# Patient Record
Sex: FEMALE | Race: WHITE | NOT HISPANIC OR LATINO | Employment: UNEMPLOYED | URBAN - METROPOLITAN AREA
[De-identification: names, ages, dates, MRNs, and addresses within clinical notes are randomized per-mention and may not be internally consistent; named-entity substitution may affect disease eponyms.]

---

## 2017-03-01 ENCOUNTER — OFFICE VISIT (OUTPATIENT)
Dept: NEUROLOGY | Facility: HOSPITAL | Age: 52
End: 2017-03-01
Attending: INTERNAL MEDICINE
Payer: MEDICAID

## 2017-03-01 ENCOUNTER — TELEPHONE (OUTPATIENT)
Dept: NEUROLOGY | Facility: HOSPITAL | Age: 52
End: 2017-03-01

## 2017-03-01 VITALS
HEIGHT: 64 IN | TEMPERATURE: 98 F | SYSTOLIC BLOOD PRESSURE: 156 MMHG | WEIGHT: 105 LBS | DIASTOLIC BLOOD PRESSURE: 106 MMHG | BODY MASS INDEX: 17.93 KG/M2 | HEART RATE: 75 BPM

## 2017-03-01 DIAGNOSIS — R10.9 ABDOMINAL PAIN, UNSPECIFIED LOCATION: Primary | ICD-10-CM

## 2017-03-01 PROCEDURE — 99215 OFFICE O/P EST HI 40 MIN: CPT | Performed by: INTERNAL MEDICINE

## 2017-03-01 RX ORDER — ALBUTEROL SULFATE 90 UG/1
AEROSOL, METERED RESPIRATORY (INHALATION)
Refills: 5 | COMMUNITY
Start: 2017-01-06

## 2017-03-01 RX ORDER — ESOMEPRAZOLE MAGNESIUM 20 MG/1
20 TABLET, DELAYED RELEASE ORAL DAILY
Qty: 90 TABLET | Refills: 1 | Status: SHIPPED | OUTPATIENT
Start: 2017-03-01 | End: 2020-01-16 | Stop reason: SDUPTHER

## 2017-03-01 RX ORDER — TIOTROPIUM BROMIDE 18 UG/1
CAPSULE ORAL; RESPIRATORY (INHALATION)
Refills: 2 | COMMUNITY
Start: 2016-12-06 | End: 2019-07-11

## 2017-03-01 RX ORDER — HYOSCYAMINE SULFATE 0.12 MG/1
0.12 TABLET SUBLINGUAL EVERY 8 HOURS PRN
Qty: 50 TABLET | Refills: 1 | Status: SHIPPED | OUTPATIENT
Start: 2017-03-01

## 2017-03-01 NOTE — TELEPHONE ENCOUNTER
----- Message from Patricia Betancourt LPN sent at 3/1/2017 11:19 AM CST -----  Patient scheduled for an EGD 3/30  CPT- 92846  DX- R10.9 Abdominal pain  Thanks

## 2017-03-01 NOTE — MR AVS SNAPSHOT
[facility] [address]  [place] [address]  Phone: [phone]  Fax: [phone]                  Amaya VALENTINE Rafael   3/1/2017 10:15 AM   Office Visit    Description:  Female : 1965   Provider:  Constantine Avila MD   Department:  [facility]           Reason for Visit     Consult           Diagnoses this Visit        Comments    Abdominal pain, unspecified location    -  Primary            To Do List           Goals (5 Years of Data)     None       These Medications        Disp Refills Start End    hyoscyamine (LEVSIN/SL) 0.125 mg Subl 50 tablet 1 3/1/2017     Place 1 tablet (0.125 mg total) under the tongue every 8 (eight) hours as needed (abdominal pain). - Sublingual    Pharmacy: [facility] [id number]  [place] - [address] AT [facility] Ph #: [phone]       esomeprazole magnesium (NEXIUM 24HR) 20 mg TbEC 90 tablet 1 3/1/2017     Take 20 mg by mouth once daily. - Oral    Pharmacy: [facility] [id number]  [place] - [address] AT [facility] Ph #: [phone]         [facility]     [facility] Care [facility] -  Assistance  Registered nurses in the [facility] provide clinical advisement, health education, appointment booking, and other advisory services.  Call for this free service at [phone].             Medications           Message regarding Medications     Verify the changes and/or additions to your medication regime listed below are the same as discussed with your clinician today.  If any of these changes or additions are incorrect, please notify your healthcare provider.        START taking these NEW medications        Refills    hyoscyamine (LEVSIN/SL) 0.125 mg Subl 1    Sig: Place 1 tablet (0.125 mg total) under the tongue every 8 (eight) hours as needed (abdominal pain).    Class: Normal    Route: Sublingual    esomeprazole  magnesium (NEXIUM 24HR) 20 mg TbEC 1    Sig: Take 20 mg by mouth once daily.    Class: Normal    Route: Oral      STOP taking these medications     hydrocodone-acetaminophen 5-325mg (NORCO) 5-325 mg per tablet Take 1 tablet by mouth every 4 (four) hours as needed for Pain.    lisinopril 10 MG tablet Take 10 mg by mouth once daily.    promethazine (PHENERGAN) 25 MG tablet Take 1 tablet (25 mg total) by mouth every 6 (six) hours as needed for Nausea.    esomeprazole (NEXIUM) 40 MG capsule Take 1 capsule (40 mg total) by mouth once daily.    omeprazole (PRILOSEC) 20 MG capsule Take 1 capsule (20 mg total) by mouth once daily.           Verify that the below list of medications is an accurate representation of the medications you are currently taking.  If none reported, the list may be blank. If incorrect, please contact your healthcare provider. Carry this list with you in case of emergency.           Current Medications     budesonide-formoterol 160-4.5 mcg (SYMBICORT) 160-4.5 mcg/actuation HFAA Inhale 2 puffs into the lungs every 12 (twelve) hours.    dicyclomine (BENTYL) 20 mg tablet Take 1 tablet (20 mg total) by mouth every 6 (six) hours.    gabapentin (NEURONTIN) 600 MG tablet Take 600 mg by mouth 3 (three) times daily.    nicotine (NICODERM CQ) 21 mg/24 hr Place 1 patch onto the skin once daily.    ondansetron (ZOFRAN) 4 MG tablet Take 1 tablet (4 mg total) by mouth every 8 (eight) hours as needed.    potassium chloride SA (K-DUR,KLOR-CON) 20 MEQ tablet Take 1 tablet (20 mEq total) by mouth once daily.    ranitidine (ZANTAC) 150 MG tablet TK 1 T PO HS    SPIRIVA WITH HANDIHALER 18 mcg inhalation capsule INHALE 1 C PO QD    sucralfate (CARAFATE) 100 mg/mL suspension Take 10 mLs (1 g total) by mouth 4 (four) times daily with meals and nightly.    VENTOLIN HFA 90 mcg/actuation inhaler INL 2 PFS PO Q 4 H PRN    alum-mag hydroxide-simeth 225-200-25 mg/5 mL Susp Take as directed on bottle    duloxetine (CYMBALTA) 30  MG capsule Take 30 mg by mouth once daily.    esomeprazole magnesium (NEXIUM 24HR) 20 mg TbEC Take 20 mg by mouth once daily.    hyoscyamine (LEVSIN/SL) 0.125 mg Subl Place 1 tablet (0.125 mg total) under the tongue every 8 (eight) hours as needed (abdominal pain).    lisinopril-hydrochlorothiazide (PRINZIDE,ZESTORETIC) 20-25 mg Tab Take 1 tablet by mouth once daily.           Clinical Reference Information           Your Vitals Were     BP                   156/106           Blood Pressure          Most Recent Value    BP  (!)  156/106      Allergies as of 3/1/2017     Ultram [Tramadol]      Immunizations Administered on Date of Encounter - 3/1/2017     None      Orders Placed During Today's Visit      Normal Orders This Visit    Case request GI: ESOPHAGOGASTRODUODENOSCOPY (EGD)       [facility] Sign-Up     Activating your [facility] account is as easy as 1-2-3!     1) Visit [email or web address], select Sign Up Now, enter this activation code and your date of birth, then select Next.  [email or web address]  Expires: 4/15/2017 11:09 AM      2) Create a username and password to use when you visit [facility] in the future and select a security question in case you lose your password and select Next.    3) Enter your e-mail address and click Sign Up!    Additional Information  If you have questions, please e-mail [email or web address] or call [phone] to talk to our [facility] staff. Remember, [facility] is NOT to be used for urgent needs. For medical emergencies, dial 911.         Instructions    You are scheduled for an EGD on Thursday March 30, 2017     You should eat light meals the day before the procedure and nothing to eat or drink after midnight the night before your procedure.    You will need to be at the 1st floor admission desk at the hospital on Thursday March 30, 2017    The hospital will call to verify time of procedure and arrival            Smoking Cessation     If you would like to quit smoking:   You may be  eligible for free services if you are a [place] resident and started smoking cigarettes before September 1, 1988.  Call the [employer]oking [employer]essation [employer] (SCT) toll free at (462) 815-3808 or (735) 335-9226.   Call 1-800-QUIT-NOW if you do not meet the above criteria.            Language Assistance Services     ATTENTION: Language assistance services are available, free of charge. Please call [phone].      ATENCIÓN: Si habla español, tiene a marshall disposición servicios gratuitos de asistencia lingüística. Llame al [phone].     [facility] Ý: N?u b?n nói Ti?ng Vi?t, có các d?ch v? h? tr? ngôn ng? mi?n phí dành cho b?n. G?i s? [phone].         [facility] complies with applicable Federal civil rights laws and does not discriminate on the basis of race, color, national origin, age, disability, or sex.

## 2017-03-01 NOTE — TELEPHONE ENCOUNTER
No Authorization is Required for procedure per Melanie at [facility]   Reference number [id number]

## 2017-03-01 NOTE — PROGRESS NOTES
"[facility]    CC: "abdominal pain for years"    HPI 51 y.o. female with h/o GERD, PUD, Tobacco Abuse, COPD (FEV1 67%), Depression, AoCD, HTN who presents to GI clinic complaining of progressive epigastric abdominal pain for years.  The pain started "many years ago."  The pain has been progressive in nature since then though it waxes and wanes in intensity.  At its best it is 1/10 in severity and at its worst it is 10/10 in severity.  She describes the pain as crampy abd pain most days but occasionally, when it is most severe, it is a stabbing/twisting pain.  She denies NSAID use as her PCP cautioned her against taking these.  She states that there are no known aggravating factors (not ass'd with eating, types of food, alcohol, or other liquid ingestion, no stress related).  The pain is alleviated with tylenol most times.  She states that 4 years ago she had one of the worst episodes of this pain and was hospitalized and told she had PUD after she had an EGD performed showing bleeding gastric ulcer.  She states she was transfuse 4 liters of blood during that admission.  Today, her pain is dull crampy and 2/10 in severity.  She has associated nausea at times.  She also has associated diarrhea at times with these episodes.  Denies history of abdominal surgeries,  section.  Denies history of abdominal trauma. Today, denies recent melena, hematochezia, vomiting.    Per chart review, patient with history of negative cscope  at [facility] and 20 mm nonbleeding ulcer on EGD in .  Patient was hospitalized in  with concern of bleeding PUD but diagnosed with acute gastroenteritis.    Past Medical History:   Diagnosis Date    Anxiety     COPD (chronic obstructive pulmonary disease)     Gastric ulcer, chronic     GERD (gastroesophageal reflux disease)     Hypercholesteremia     Hypertension      Past Surgical History:   Procedure Laterality Date    BACK SURGERY       Social History  Social History " "  Substance Use Topics    Smoking status: Current Every Day Smoker     Packs/day: 1.00     Years: 43.00     Types: Cigarettes    Smokeless tobacco: None    Alcohol use No     Family History  Mother + father: MI,   Sister: cholecystectomy    Review of Systems  General ROS: negative for chills, fever or weight loss  Psychological ROS: negative for hallucination, depression or suicidal ideation  Ophthalmic ROS: negative for eye pain, positive for worsening vision  ENT ROS: negative for epistaxis, sore throat or rhinorrhea  Respiratory ROS: no cough, shortness of breath, or wheezing  Cardiovascular ROS: no chest pain or dyspnea on exertion  Gastrointestinal ROS: denies black/ bloody stools  Genito-Urinary ROS: no dysuria, trouble voiding, or hematuria  Musculoskeletal ROS: negative for gait disturbance or muscular weakness  Neurological ROS: no syncope or seizures; no ataxia  Dermatological ROS: negative for pruritis, rash and jaundice    Physical Examination  BP (!) 156/106  Pulse 75  Temp 98.2 °F (36.8 °C) (Oral)   Ht 5' 4" (1.626 m)  Wt 47.6 kg (105 lb)  LMP  (LMP Unknown)  BMI 18.02 kg/m2  General appearance: alert, cooperative, no distress  HENT: Normocephalic, atraumatic, neck symmetrical, no nasal discharge   Eyes: conjunctivae/corneas clear, PERRL, EOM's intact  Lungs: clear to auscultation bilaterally, no dullness to percussion bilaterally  Heart: regular rate and rhythm without rub; no displacement of the PMI   Abdomen: soft, mildly TTP in epigastric area, no organomegaly  Extremities: extremities symmetric; no clubbing, cyanosis, or edema  Integument: Skin color, texture, turgor normal; no rashes; hair distrubution normal  Neurologic: Alert and oriented X 3, normal strength, normal coordination and gait  Psychiatric: no pressured speech; normal affect; no evidence of impaired cognition     Labs:  Lab Results   Component Value Date    WBC 11.15 2016    HGB 12.1 2016    HCT 37.9 " 12/21/2016    MCV 90 12/21/2016     (H) 12/21/2016     Imaging:  Abd U/S 11/2015: no abnormality  KUB 12/2016: no obstructive, no abnormality    Previous lab, imaging and medical records reviewed     Assessment:   Ms. Johnston is a 50 yo WF with h/o GERD, PUD, Tobacco Abuse, COPD (FEV1 67%), Depression, AoCD, HTN c/o chronic abdominal pain>10 years in duration which is present often but occasionally flares resulting in severe pain with vomiting. She has no previous episodes of intestinal edema and her symptoms persist off her ACE x several months so ACE induced visceral angioedema is unlikely. She has some improvement in her symptoms with bentyl. This patient's symptoms are most consistent with IBD/functional dyspepsia. Patient of Dr. Lila Redmond     Plan:   Trial of levsin SL as an alternative to bentyl   Switch zantac for daily PPI   Plan EGD with biopsy   If symptoms persist and EGD normal, I will recommend a trial of elavil as the next step   Conservative management if possible     Constantine Avila MD   [address] [place] [address], [address]   [place] [address] [phone]

## 2017-03-01 NOTE — PATIENT INSTRUCTIONS
You are scheduled for an EGD on Thursday March 30, 2017     You should eat light meals the day before the procedure and nothing to eat or drink after midnight the night before your procedure.    You will need to be at the 1st floor admission desk at the hospital on Thursday March 30, 2017    The hospital will call to verify time of procedure and arrival

## 2017-03-28 ENCOUNTER — ANESTHESIA EVENT (OUTPATIENT)
Dept: ENDOSCOPY | Facility: HOSPITAL | Age: 52
End: 2017-03-28
Payer: MEDICAID

## 2017-03-28 NOTE — ANESTHESIA PREPROCEDURE EVALUATION
03/28/2017  Amaya Hall is a 51 y.o., female w abd pain for EGD.    PRIOR ANES IN EPIC  Nothing as of 2017-03-28    ANES-RELATED MED/SURG 2017-03-01  HTN  HLD  Peptic Ulcer hx   - upper GI Bleed hx  Chest Pain  GERD  Pulmonary Emphysema  R Lung lesion (cavitary)  CKD    Past Surgical History:   Procedure Laterality Date    BACK SURGERY       ALLERGIES  Review of patient's allergies indicates:   Allergen Reactions    Ultram [tramadol] Swelling     ANES-RELATED HOME Rx 2017-03-01  Nicotine Ventolin Sucralfate Gabapentin (seizures)  KCl  Spiriva  esomeprazole    Symbicort Ranitidine    [facility] Anesthesia Evaluation         Review of Systems  Anesthesia Hx:  Denies Hx of Anesthetic complications History of prior surgery of interest to airway management or planning:  Denies Personal Hx of Anesthesia complications.   Social:  Smoker, Social Alcohol Use    Hematology/Oncology:     Oncology Normal    -- Anemia:   EENT/Dental:   Upper full denture  Lower partial denture  No nosebleeds   Cardiovascular:   Hypertension    Pulmonary:   COPD Denies Sleep Apnea.    Renal/:   Chronic Renal Disease (CKD)    Hepatic/GI:   PUD, GERD, well controlled Denies Liver Disease. Denies Hepatitis.    Neurological:   Seizures (last seizure 2017-01)    Endocrine:  Endocrine Normal      Wt Readings from Last 3 Encounters:   03/01/17 47.6 kg (105 lb)   12/19/16 53 kg (116 lb 14.4 oz)   08/16/16 52.2 kg (115 lb)     Temp Readings from Last 3 Encounters:   03/01/17 36.8 °C (98.2 °F) (Oral)   12/21/16 36.2 °C (97.2 °F) (Oral)   07/21/16 36.4 °C (97.6 °F) (Oral)     BP Readings from Last 3 Encounters:   03/01/17 (!) 156/106   12/21/16 (!) 178/111   08/16/16 (!) 90/56     Pulse Readings from Last 3 Encounters:   03/01/17 75   12/21/16 (!) 111   08/16/16 80       Physical Exam  General:  Well nourished    Airway/Jaw/Neck:  AIRWAY  FINDINGS: Normal       Dental:  Dental Findings: Upper Dentures, lower partial dentures, Edentulous     Heart/Vascular:  Heart Findings: Normal       Mental Status:  Mental Status Findings: Normal       Lab Results   Component Value Date    WBC 11.15 12/21/2016    HGB 12.1 12/21/2016    HCT 37.9 12/21/2016    MCV 90 12/21/2016     (H) 12/21/2016       Chemistry        Component Value Date/Time     12/21/2016 0412    K 3.5 12/21/2016 0412     12/21/2016 0412    CO2 23 12/21/2016 0412    BUN 10 12/21/2016 0412    CREATININE 0.9 12/21/2016 0412     (H) 12/21/2016 0412        Component Value Date/Time    CALCIUM 9.6 12/21/2016 0412    ALKPHOS 140 (H) 12/21/2016 0412    AST 16 12/21/2016 0412    ALT 17 12/21/2016 0412    BILITOT 0.3 12/21/2016 0412        Lab Results   Component Value Date    ALBUMIN 3.0 (L) 12/21/2016     CXR 2016-12-20  Multiple extrinsic devices project over the chest including cardiac monitoring leads.The cardiomediastinal silhouette is normal in size and midline. Pulmonary vascularity appears within normal limits.    Irregular consolidation with areas of cavitation in the right upper lobe. No new lobar consolidation identified elsewhere. Mild volume loss in the right hemithorax. No pleural fluid or pneumothorax.     EKG 2016-12-20  Sinus tachycardia 104  Biatrial enlargement  Right bundle branch block  Abnormal ECG  When compared with ECG of 20-DEC-2016 19:14,  Premature supraventricular complexes are no longer Present  Confirmed by Sy    ECHO 2016-12-19    1 - Normal left ventricular systolic function (EF 65-70%).     2 - Normal right ventricular systolic function .     3 - The estimated PA systolic pressure is 16 mmHg.       Anesthesia Plan  Type of Anesthesia, risks & benefits discussed:  Anesthesia Type:  MAC  Patient's Preference:   Intra-op Monitoring Plan:   Intra-op Monitoring Plan Comments:   Post Op Pain Control Plan:   Post Op Pain Control Plan Comments:    Induction:    Beta Blocker:  Patient is not currently on a Beta-Blocker (No further documentation required).       Informed Consent: Patient understands risks and agrees with Anesthesia plan.  Questions answered. Anesthesia consent signed with patient.  ASA Score: 3     Day of Surgery Review of History & Physical:            Ready For Surgery From Anesthesia Perspective.

## 2017-03-30 ENCOUNTER — SURGERY (OUTPATIENT)
Age: 52
End: 2017-03-30

## 2017-03-30 ENCOUNTER — HOSPITAL ENCOUNTER (OUTPATIENT)
Facility: HOSPITAL | Age: 52
Discharge: HOME | End: 2017-03-30
Attending: INTERNAL MEDICINE | Admitting: INTERNAL MEDICINE
Payer: MEDICAID

## 2017-03-30 ENCOUNTER — ANESTHESIA (OUTPATIENT)
Dept: ENDOSCOPY | Facility: HOSPITAL | Age: 52
End: 2017-03-30
Payer: MEDICAID

## 2017-03-30 DIAGNOSIS — Z87.11 H/O PEPTIC ULCER: Primary | ICD-10-CM

## 2017-03-30 DIAGNOSIS — K29.70 GASTRITIS, PRESENCE OF BLEEDING UNSPECIFIED, UNSPECIFIED CHRONICITY, UNSPECIFIED GASTRITIS TYPE: ICD-10-CM

## 2017-03-30 DIAGNOSIS — R10.9 ABDOMINAL PAIN: ICD-10-CM

## 2017-03-30 PROCEDURE — 88342 IMHCHEM/IMCYTCHM 1ST ANTB: CPT | Mod: 26,,, | Performed by: PATHOLOGY

## 2017-03-30 PROCEDURE — 25000003 PHARM REV CODE 250: Performed by: INTERNAL MEDICINE

## 2017-03-30 PROCEDURE — 63600175 PHARM REV CODE 636 W HCPCS: Performed by: NURSE ANESTHETIST, CERTIFIED REGISTERED

## 2017-03-30 PROCEDURE — 27201012 HC FORCEPS, HOT/COLD, DISP: Performed by: INTERNAL MEDICINE

## 2017-03-30 PROCEDURE — 88305 TISSUE EXAM BY PATHOLOGIST: CPT | Performed by: PATHOLOGY

## 2017-03-30 PROCEDURE — 88305 TISSUE EXAM BY PATHOLOGIST: CPT | Mod: 26,,, | Performed by: PATHOLOGY

## 2017-03-30 PROCEDURE — 43239 EGD BIOPSY SINGLE/MULTIPLE: CPT | Performed by: INTERNAL MEDICINE

## 2017-03-30 PROCEDURE — 37000009 HC ANESTHESIA EA ADD 15 MINS: Performed by: INTERNAL MEDICINE

## 2017-03-30 PROCEDURE — 25000003 PHARM REV CODE 250: Performed by: NURSE ANESTHETIST, CERTIFIED REGISTERED

## 2017-03-30 PROCEDURE — 37000008 HC ANESTHESIA 1ST 15 MINUTES: Performed by: INTERNAL MEDICINE

## 2017-03-30 RX ORDER — SODIUM CHLORIDE 9 MG/ML
INJECTION, SOLUTION INTRAVENOUS CONTINUOUS
Status: DISCONTINUED | OUTPATIENT
Start: 2017-03-30 | End: 2017-03-30 | Stop reason: HOSPADM

## 2017-03-30 RX ORDER — LIDOCAINE HCL/PF 100 MG/5ML
SYRINGE (ML) INTRAVENOUS
Status: DISCONTINUED | OUTPATIENT
Start: 2017-03-30 | End: 2017-03-30

## 2017-03-30 RX ORDER — FENTANYL CITRATE 50 UG/ML
INJECTION, SOLUTION INTRAMUSCULAR; INTRAVENOUS
Status: DISCONTINUED | OUTPATIENT
Start: 2017-03-30 | End: 2017-03-30

## 2017-03-30 RX ORDER — PROPOFOL 10 MG/ML
VIAL (ML) INTRAVENOUS CONTINUOUS PRN
Status: DISCONTINUED | OUTPATIENT
Start: 2017-03-30 | End: 2017-03-30

## 2017-03-30 RX ORDER — PROPOFOL 10 MG/ML
VIAL (ML) INTRAVENOUS
Status: DISCONTINUED | OUTPATIENT
Start: 2017-03-30 | End: 2017-03-30

## 2017-03-30 RX ADMIN — LIDOCAINE HYDROCHLORIDE 100 MG: 20 INJECTION, SOLUTION INTRAVENOUS at 08:03

## 2017-03-30 RX ADMIN — PROPOFOL 50 MG: 10 INJECTION, EMULSION INTRAVENOUS at 08:03

## 2017-03-30 RX ADMIN — PROPOFOL 150 MCG/KG/MIN: 10 INJECTION, EMULSION INTRAVENOUS at 08:03

## 2017-03-30 RX ADMIN — FENTANYL CITRATE 50 MCG: 50 INJECTION, SOLUTION INTRAMUSCULAR; INTRAVENOUS at 08:03

## 2017-03-30 RX ADMIN — SODIUM CHLORIDE: 0.9 INJECTION, SOLUTION INTRAVENOUS at 07:03

## 2017-03-30 NOTE — TRANSFER OF CARE
"Anesthesia Transfer of Care Note    Patient: Amaya Hall    Procedure(s) Performed: Procedure(s) (LRB):  ESOPHAGOGASTRODUODENOSCOPY (EGD) (N/A)    Patient location: GI    Anesthesia Type: MAC    Transport from OR: Transported from OR on room air with adequate spontaneous ventilation    Post pain: adequate analgesia    Post assessment: no apparent anesthetic complications and tolerated procedure well    Post vital signs: stable    Level of consciousness: awake, alert and oriented    Nausea/Vomiting: no nausea/vomiting    Complications: none          Last vitals:   Visit Vitals    /74 (Patient Position: Lying, BP Method: Automatic)    Pulse 77    Temp 36.7 °C (98 °F) (Oral)    Resp 18    Ht 5' 4" (1.626 m)    Wt 52.2 kg (115 lb)    LMP  (LMP Unknown)    SpO2 97%    Breastfeeding No    BMI 19.74 kg/m2     "

## 2017-03-30 NOTE — H&P
"[facility]    CC: abdominal pain    HPI 51 y.o. female with h/o GERD, PUD, Tobacco Abuse, COPD (FEV1 67%), Depression, AoCD, HTN who presents to GI clinic complaining of progressive epigastric abdominal pain for years. The pain started "many years ago." The pain has been progressive in nature since then though it waxes and wanes in intensity. At its best it is 1/10 in severity and at its worst it is 10/10 in severity. She describes the pain as crampy abd pain most days but occasionally, when it is most severe, it is a stabbing/twisting pain. She denies NSAID use as her PCP cautioned her against taking these. She states that there are no known aggravating factors (not ass'd with eating, types of food, alcohol, or other liquid ingestion, no stress related). The pain is alleviated with tylenol most times. She states that 4 years ago she had one of the worst episodes of this pain and was hospitalized and told she had PUD after she had an EGD performed showing bleeding gastric ulcer. She states she was transfuse 4 liters of blood during that admission. Today, her pain is dull crampy and 2/10 in severity. She has associated nausea at times. She also has associated diarrhea at times with these episodes. Denies history of abdominal surgeries,  section. Denies history of abdominal trauma. Today, denies recent melena, hematochezia, vomiting.     Per chart review, patient with history of negative cscope  at [facility] and 20 mm nonbleeding ulcer on EGD in . Patient was hospitalized in  with concern of bleeding PUD but diagnosed with acute gastroenteritis.      Past Medical History:   Diagnosis Date    Anxiety     COPD (chronic obstructive pulmonary disease)     Gastric ulcer, chronic     GERD (gastroesophageal reflux disease)     Hypercholesteremia     Hypertension          Review of Systems  General ROS: negative for chills, fever or weight loss  Cardiovascular ROS: no chest pain or dyspnea on " exertion  Gastrointestinal ROS: no abdominal pain, change in bowel habits, or black/ bloody stools    Physical Examination  LMP  (LMP Unknown)  Breastfeeding? No  General appearance: alert, cooperative, no distress  HENT: Normocephalic, atraumatic, neck symmetrical, no nasal discharge   Lungs: clear to auscultation bilaterally, no dullness to percussion bilaterally  Heart: regular rate and rhythm without rub; no displacement of the PMI   Abdomen: soft, non-tender; bowel sounds normoactive; no organomegaly  Extremities: extremities symmetric; no clubbing, cyanosis, or edema  Neurologic: Alert and oriented X 3, normal strength, normal coordination and gait    Labs:  Lab Results   Component Value Date    WBC 11.15 12/21/2016    HGB 12.1 12/21/2016    HCT 37.9 12/21/2016    MCV 90 12/21/2016     (H) 12/21/2016         Imaging: Abdominal plain film 12/2016: normal    Assessment:   Ms. Johnston is a 50 yo WF with h/o GERD, PUD, Tobacco Abuse, COPD (FEV1 67%), Depression, AoCD, HTN c/o chronic abdominal pain>10 years in duration which is present often but occasionally flares resulting in severe pain with vomiting. She has no previous episodes of intestinal edema and her symptoms persist off her ACE x several months so ACE induced visceral angioedema is unlikely. She has some improvement in her symptoms with bentyl. This patient's symptoms are most consistent with IBD/functional dyspepsia. Patient of Dr. Lila Redmond     Plan: EGD with biopsy today      Constantine Avila MD   [address] [facility], [address]   [place] [address] [phone]

## 2017-03-30 NOTE — ANESTHESIA POSTPROCEDURE EVALUATION
"Anesthesia Post Evaluation    Patient: Amaya Hall    Procedure(s) Performed: Procedure(s) (LRB):  ESOPHAGOGASTRODUODENOSCOPY (EGD) (N/A)    Final Anesthesia Type: MAC  Patient location during evaluation: GI PACU  Patient participation: Yes- Able to Participate  Level of consciousness: awake and alert and oriented  Post-procedure vital signs: reviewed and stable  Pain management: adequate  Airway patency: patent  PONV status at discharge: No PONV  Anesthetic complications: no      Cardiovascular status: blood pressure returned to baseline, hemodynamically stable and stable  Respiratory status: unassisted, spontaneous ventilation and room air  Hydration status: euvolemic  Follow-up not needed.        Visit Vitals    /74 (Patient Position: Lying, BP Method: Automatic)    Pulse 77    Temp 36.7 °C (98 °F) (Oral)    Resp 18    Ht 5' 4" (1.626 m)    Wt 52.2 kg (115 lb)    LMP  (LMP Unknown)    SpO2 97%    Breastfeeding No    BMI 19.74 kg/m2       Pain/Nishant Score: Pain Assessment Performed: Yes (3/30/2017  7:35 AM)  Presence of Pain: denies (3/30/2017  7:35 AM)      "

## 2017-03-30 NOTE — IP AVS SNAPSHOT
[facility]  [address]  [place] [address]  Phone: [phone]           Patient Discharge Instructions   Our goal is to set you up for success. This packet includes information on your condition, medications, and your home care.  It will help you care for yourself to prevent having to return to the hospital.     Please ask your nurse if you have any questions.      There are many details to remember when preparing to leave the hospital. Here is what you will need to do:    1. Take your medicine. If you are prescribed medications, review your Medication List on the following pages. You may have new medications to  at the pharmacy and others that you'll need to stop taking. Review the instructions for how and when to take your medications. Talk with your doctor or nurses if you are unsure of what to do.     2. Go to your follow-up appointments. Specific follow-up information is listed in the following pages. Your may be contacted by a nurse or clinical provider about future appointments. Be sure we have all of the phone numbers to reach you. Please contact your provider's office if you are unable to make an appointment.     3. Watch for warning signs. Your doctor or nurse will give you detailed warning signs to watch for and when to call for assistance. These instructions may also include educational information about your condition. If you experience any of warning signs to your health, call your doctor.               ** Verify the list of medication(s) below is accurate and up to date. Carry this with you in case of emergency. If your medications have changed, please notify your healthcare provider.             Medication List      ASK your doctor about these medications        Additional Info                      alum-mag hydroxide-simeth 225-200-25 mg/5 mL Susp   Quantity:  360 mL   Refills:  0    Instructions:  Take as directed on bottle     Begin Date    AM    Noon    PM     Bedtime       budesonide-formoterol 160-4.5 mcg 160-4.5 mcg/actuation Hfaa   Commonly known as:  SYMBICORT   Refills:  0   Dose:  2 puff    Instructions:  Inhale 2 puffs into the lungs every 12 (twelve) hours.     Begin Date    AM    Noon    PM    Bedtime       dicyclomine 20 mg tablet   Commonly known as:  BENTYL   Quantity:  30 tablet   Refills:  0   Dose:  20 mg    Instructions:  Take 1 tablet (20 mg total) by mouth every 6 (six) hours.     Begin Date    AM    Noon    PM    Bedtime       duloxetine 30 MG capsule   Commonly known as:  CYMBALTA   Refills:  0   Dose:  30 mg    Instructions:  Take 30 mg by mouth once daily.     Begin Date    AM    Noon    PM    Bedtime       esomeprazole magnesium 20 mg Tbec   Commonly known as:  NEXIUM 24HR   Quantity:  90 tablet   Refills:  1   Dose:  20 mg    Instructions:  Take 20 mg by mouth once daily.     Begin Date    AM    Noon    PM    Bedtime       gabapentin 600 MG tablet   Commonly known as:  NEURONTIN   Refills:  0   Dose:  600 mg    Instructions:  Take 600 mg by mouth 3 (three) times daily.     Begin Date    AM    Noon    PM    Bedtime       hyoscyamine 0.125 mg Subl   Commonly known as:  LEVSIN/SL   Quantity:  50 tablet   Refills:  1   Dose:  0.125 mg    Instructions:  Place 1 tablet (0.125 mg total) under the tongue every 8 (eight) hours as needed (abdominal pain).     Begin Date    AM    Noon    PM    Bedtime       lisinopril-hydrochlorothiazide 20-25 mg Tab   Commonly known as:  PRINZIDE,ZESTORETIC   Quantity:  30 tablet   Refills:  6   Dose:  1 tablet    Instructions:  Take 1 tablet by mouth once daily.     Begin Date    AM    Noon    PM    Bedtime       nicotine 21 mg/24 hr   Commonly known as:  NICODERM CQ   Quantity:  1 patch   Refills:  0   Dose:  1 patch    Instructions:  Place 1 patch onto the skin once daily.     Begin Date    AM    Noon    PM    Bedtime       ondansetron 4 MG tablet   Commonly known as:  ZOFRAN   Quantity:  12 tablet   Refills:  0   Dose:   4 mg    Instructions:  Take 1 tablet (4 mg total) by mouth every 8 (eight) hours as needed.     Begin Date    AM    Noon    PM    Bedtime       potassium chloride SA 20 MEQ tablet   Commonly known as:  K-DUR,KLOR-CON   Quantity:  5 tablet   Refills:  0   Dose:  20 mEq    Instructions:  Take 1 tablet (20 mEq total) by mouth once daily.     Begin Date    AM    Noon    PM    Bedtime       ranitidine 150 MG tablet   Commonly known as:  ZANTAC   Refills:  5    Instructions:  TK 1 T PO HS     Begin Date    AM    Noon    PM    Bedtime       SPIRIVA WITH HANDIHALER 18 mcg inhalation capsule   Refills:  2   Generic drug:  tiotropium    Instructions:  INHALE 1 C PO QD     Begin Date    AM    Noon    PM    Bedtime       sucralfate 100 mg/mL suspension   Commonly known as:  CARAFATE   Quantity:  420 mL   Refills:  2   Dose:  1 g    Instructions:  Take 10 mLs (1 g total) by mouth 4 (four) times daily with meals and nightly.     Begin Date    AM    Noon    PM    Bedtime       VENTOLIN HFA 90 mcg/actuation inhaler   Refills:  5   Generic drug:  albuterol    Instructions:  INL 2 PFS PO Q 4 H PRN     Begin Date    AM    Noon    PM    Bedtime                  Please bring to all follow up appointments:    1. A copy of your discharge instructions.  2. All medicines you are currently taking in their original bottles.  3. Identification and insurance card.    Please arrive 15 minutes ahead of scheduled appointment time.    Please call 24 hours in advance if you must reschedule your appointment and/or time.            Discharge Instructions       Post EGD Discharge Instruction    Amaya Hall  3/30/2017  Constantine Avila MD    RESTRICTIONS ON ACTIVITY:    -DO NOT drive a car or operate machinery until the day after procedure.  -Following Day: Return to full activities including work.  -Diet: Eat and drink normally unless instructed otherwise.    TREATMENT FOR COMMON SIDE EFFECTS:  *Sore Throat - treat with throat lozenges,  "gargle with warm salt water.  *Mild abdominal pain & bloating- rest and take liquids only.    SYMPTOMS TO WATCH FOR AND REPORT TO YOUR PHYSICIAN:  1. Chills or fever occurring 24 hours after procedure.  2. Pain in chest.  3. SEVERE abdominal pain or bloating.  4. Rectal bleeding which could be maroon or black.    If you have any questions or problems, please call your Physician:    Constantine Avila MD Phone: ***    Lab Results: [phone]    If a complication or emergency situation arises and you are unable to reach your Physician - GO TO THE EMERGENCY ROOM.        Admission Information     Date & Time Provider Department CSN    3/30/2017  7:19 AM Constantine Avila MD [facility] [id number]      Care Providers     Provider Role Specialty Primary office phone    Constantine Avila MD Attending Provider Gastroenterology [phone]    Keith Cintron MD Consulting Physician  Anesthesiology [phone]    Constantine Avila MD Surgeon  Gastroenterology [phone]      Your Vitals Were     BP Pulse Temp Resp Height Weight    135/110 (BP Location: Right arm, Patient Position: Lying, BP Method: Automatic) 78 97.6 °F (36.4 °C) 18 5' 4" (1.626 m) 52.2 kg (115 lb)    Last Period SpO2 BMI          (LMP Unknown) 100% 19.74 kg/m2        Recent Lab Values        7/27/2013 12/17/2016                        2:15 AM  6:42 PM          A1C 5.4 6.1          Comment for A1C at  6:42 PM on 12/17/2016:  According to ADA guidelines, hemoglobin A1C <7.0% represents  optimal control in non-pregnant diabetic patients.  Different  metrics may apply to specific populations.   Standards of Medical Care in Diabetes - 2016.  For the purpose of screening for the presence of diabetes:  <5.7%     Consistent with the absence of diabetes  5.7-6.4%  Consistent with increasing risk for diabetes   (prediabetes)  >or=6.5%  Consistent with diabetes  Currently no consensus exists for use of hemoglobin A1C  for diagnosis of diabetes " for children.        Pending Labs     Order Current Status    Specimen to Pathology - Surgery Collected (03/30/17 0838)      Allergies as of 3/30/2017        Reactions    Ultram [Tramadol] Swelling      [facility]     [facility] Nurse Care Line - 24/7 Assistance  Unless otherwise directed by your provider, please contact [facility], our nurse care line that is available for 24/7 assistance.     Registered nurses in the [facility] provide clinical advisement, health education, appointment booking, and other advisory services.  Call for this free service at [phone].        Advance Directives     An advance directive is a document which, in the event you are no longer able to make decisions for yourself, tells your healthcare team what kind of treatment you do or do not want to receive, or who you would like to make those decisions for you.  If you do not currently have an advance directive, [facility] encourages you to create one.  For more information call:  [phone]), [phone] ([phone]),  or log on to [email or web address].        Smoking Cessation     If you would like to quit smoking:   You may be eligible for free services if you are a [place] resident and started smoking cigarettes before September 1, 1988.  Call the Smoking Cessation Trust (SCT) toll free at (249) 584-3018 or ([phone] [phone].   Call 8-453-QUIT-NOW if you do not meet the above criteria.   Contact us via email: [email or web address]   View our website for more information: [email or web address]        Language Assistance Services     ATTENTION: Language assistance services are available, free of charge. Please call [phone].      ATENCIÓN: Si habla español, tiene a marshall disposición servicios gratuitos de asistencia lingüística. Llame al [phone].     CHÚ Ý: N?u b?n nói Ti?ng Vi?t, có các d?ch v? h? tr? ngôn ng? mi?n phí dành cho b?n. G?i s? [phone].         Pneumonmia Discharge Instructions                Chronic Kindey Disease Education             [facility] Sign-Up     Activating your [facility] account is as easy as 1-2-3!     1) Visit [email or web address], select Sign Up Now, enter this activation code and your date of birth, then select Next.  [email or web address]  Expires: 4/15/2017 12:09 PM      2) Create a username and password to use when you visit [facility] in the future and select a security question in case you lose your password and select Next.    3) Enter your e-mail address and click Sign Up!    Additional Information  If you have questions, please e-mail [email or web address] or call [phone] to talk to our [facility] staff. Remember, [facility] is NOT to be used for urgent needs. For medical emergencies, dial 911.          [facility] complies with applicable Federal civil rights laws and does not discriminate on the basis of race, color, national origin, age, disability, or sex.

## 2017-03-30 NOTE — DISCHARGE INSTRUCTIONS
Post EGD Discharge Instruction    Amaya SERGE Rafael  3/30/2017  Constantine Avila MD    RESTRICTIONS ON ACTIVITY:    -DO NOT drive a car or operate machinery until the day after procedure.  -Following Day: Return to full activities including work.  -Diet: Eat and drink normally unless instructed otherwise.    TREATMENT FOR COMMON SIDE EFFECTS:  *Sore Throat - treat with throat lozenges, gargle with warm salt water.  *Mild abdominal pain & bloating- rest and take liquids only.    SYMPTOMS TO WATCH FOR AND REPORT TO YOUR PHYSICIAN:  1. Chills or fever occurring 24 hours after procedure.  2. Pain in chest.  3. SEVERE abdominal pain or bloating.  4. Rectal bleeding which could be maroon or black.    If you have any questions or problems, please call your Physician:    Constantine Avila MD Phone: ***    Lab Results: [phone]    If a complication or emergency situation arises and you are unable to reach your Physician - GO TO THE EMERGENCY ROOM.

## 2017-03-30 NOTE — PLAN OF CARE
PT SLEEPING, EASILY AROUSED.  NO C/O PAIN, DISCOMFORT OR N/V.  PT TO EKG MONITOR.  PT CARE ASSUMED.

## 2017-03-31 VITALS
HEIGHT: 64 IN | WEIGHT: 115 LBS | TEMPERATURE: 98 F | RESPIRATION RATE: 18 BRPM | BODY MASS INDEX: 19.63 KG/M2 | OXYGEN SATURATION: 100 % | DIASTOLIC BLOOD PRESSURE: 110 MMHG | SYSTOLIC BLOOD PRESSURE: 135 MMHG | HEART RATE: 78 BPM

## 2017-04-07 ENCOUNTER — HOSPITAL ENCOUNTER (OUTPATIENT)
Dept: RADIOLOGY | Facility: HOSPITAL | Age: 52
Discharge: HOME | End: 2017-04-07
Attending: INTERNAL MEDICINE
Payer: MEDICAID

## 2017-04-07 DIAGNOSIS — J44.1 OBSTRUCTIVE CHRONIC BRONCHITIS WITH ACUTE EXACERBATION: ICD-10-CM

## 2017-04-07 DIAGNOSIS — A31.0 PULMONARY MYCOBACTERIUM AVIUM-INTRACELLULARE INFECTION: ICD-10-CM

## 2017-04-07 PROCEDURE — 71250 CT THORAX DX C-: CPT | Mod: 26,,, | Performed by: RADIOLOGY

## 2017-04-07 PROCEDURE — 71250 CT THORAX DX C-: CPT | Mod: TC

## 2017-04-13 ENCOUNTER — TELEPHONE (OUTPATIENT)
Dept: NEUROLOGY | Facility: HOSPITAL | Age: 52
End: 2017-04-13

## 2017-04-13 RX ORDER — AMITRIPTYLINE HYDROCHLORIDE 25 MG/1
TABLET, FILM COATED ORAL
Qty: 30 TABLET | Refills: 0 | Status: SHIPPED | OUTPATIENT
Start: 2017-04-13

## 2017-04-13 NOTE — TELEPHONE ENCOUNTER
----- Message from Kaitlin Guillaume sent at 4/13/2017 10:22 AM CDT -----  Contact: Patient  GI- Patient did scope on  3/30/2017 and has not received any results. Patients contact number is [phone]

## 2017-04-13 NOTE — TELEPHONE ENCOUNTER
Called but could not reach Mrs. Hall about her EGD pathology results: no abnormality or H.P.  I will prescribe one month of Elavil 25mg QHS to her pharmacy ([facility]) and she was instructed via voicemail to call my office in 2-4 weeks to reassess her symptoms while on this medication.

## 2018-09-26 ENCOUNTER — HOSPITAL ENCOUNTER (EMERGENCY)
Facility: HOSPITAL | Age: 53
Discharge: LEFT BEFORE DISCHARGE | End: 2018-09-26
Attending: EMERGENCY MEDICINE
Payer: MEDICAID

## 2018-09-26 VITALS
SYSTOLIC BLOOD PRESSURE: 172 MMHG | DIASTOLIC BLOOD PRESSURE: 97 MMHG | TEMPERATURE: 98 F | RESPIRATION RATE: 18 BRPM | BODY MASS INDEX: 17.75 KG/M2 | WEIGHT: 104 LBS | HEART RATE: 99 BPM | HEIGHT: 64 IN | OXYGEN SATURATION: 98 %

## 2018-09-26 DIAGNOSIS — G89.29 CHRONIC EPIGASTRIC PAIN: Primary | ICD-10-CM

## 2018-09-26 DIAGNOSIS — R10.13 CHRONIC EPIGASTRIC PAIN: Primary | ICD-10-CM

## 2018-09-26 LAB
ALBUMIN SERPL BCP-MCNC: 4.1 G/DL
ALP SERPL-CCNC: 135 U/L
ALT SERPL W/O P-5'-P-CCNC: 25 U/L
ANION GAP SERPL CALC-SCNC: 15 MMOL/L
AST SERPL-CCNC: 29 U/L
BASOPHILS # BLD AUTO: 0.03 K/UL
BASOPHILS NFR BLD: 0.2 %
BILIRUB SERPL-MCNC: 0.7 MG/DL
BUN SERPL-MCNC: 12 MG/DL
CALCIUM SERPL-MCNC: 10.7 MG/DL
CHLORIDE SERPL-SCNC: 98 MMOL/L
CO2 SERPL-SCNC: 27 MMOL/L
CREAT SERPL-MCNC: 0.8 MG/DL
DIFFERENTIAL METHOD: ABNORMAL
EOSINOPHIL # BLD AUTO: 0.2 K/UL
EOSINOPHIL NFR BLD: 1.4 %
ERYTHROCYTE [DISTWIDTH] IN BLOOD BY AUTOMATED COUNT: 15.2 %
EST. GFR  (AFRICAN AMERICAN): >60 ML/MIN/1.73 M^2
EST. GFR  (NON AFRICAN AMERICAN): >60 ML/MIN/1.73 M^2
GLUCOSE SERPL-MCNC: 120 MG/DL
HCT VFR BLD AUTO: 51.8 %
HGB BLD-MCNC: 17.4 G/DL
LIPASE SERPL-CCNC: 55 U/L
LYMPHOCYTES # BLD AUTO: 5.7 K/UL
LYMPHOCYTES NFR BLD: 45.3 %
MCH RBC QN AUTO: 32.3 PG
MCHC RBC AUTO-ENTMCNC: 33.6 G/DL
MCV RBC AUTO: 96 FL
MONOCYTES # BLD AUTO: 1.4 K/UL
MONOCYTES NFR BLD: 11.4 %
NEUTROPHILS # BLD AUTO: 5.2 K/UL
NEUTROPHILS NFR BLD: 42.1 %
PLATELET # BLD AUTO: 333 K/UL
PMV BLD AUTO: 10.1 FL
POTASSIUM SERPL-SCNC: 3.2 MMOL/L
PROT SERPL-MCNC: 8.3 G/DL
RBC # BLD AUTO: 5.38 M/UL
SODIUM SERPL-SCNC: 140 MMOL/L
WBC # BLD AUTO: 12.55 K/UL

## 2018-09-26 PROCEDURE — 83690 ASSAY OF LIPASE: CPT

## 2018-09-26 PROCEDURE — 25000003 PHARM REV CODE 250: Performed by: EMERGENCY MEDICINE

## 2018-09-26 PROCEDURE — 96361 HYDRATE IV INFUSION ADD-ON: CPT

## 2018-09-26 PROCEDURE — 63600175 PHARM REV CODE 636 W HCPCS: Performed by: EMERGENCY MEDICINE

## 2018-09-26 PROCEDURE — 99285 EMERGENCY DEPT VISIT HI MDM: CPT | Mod: 25

## 2018-09-26 PROCEDURE — 85025 COMPLETE CBC W/AUTO DIFF WBC: CPT

## 2018-09-26 PROCEDURE — C9113 INJ PANTOPRAZOLE SODIUM, VIA: HCPCS | Performed by: EMERGENCY MEDICINE

## 2018-09-26 PROCEDURE — 96375 TX/PRO/DX INJ NEW DRUG ADDON: CPT

## 2018-09-26 PROCEDURE — 96374 THER/PROPH/DIAG INJ IV PUSH: CPT

## 2018-09-26 PROCEDURE — 80053 COMPREHEN METABOLIC PANEL: CPT

## 2018-09-26 RX ORDER — ONDANSETRON 2 MG/ML
4 INJECTION INTRAMUSCULAR; INTRAVENOUS
Status: COMPLETED | OUTPATIENT
Start: 2018-09-26 | End: 2018-09-26

## 2018-09-26 RX ORDER — ATORVASTATIN CALCIUM 80 MG/1
80 TABLET, FILM COATED ORAL DAILY
COMMUNITY

## 2018-09-26 RX ORDER — PANTOPRAZOLE SODIUM 40 MG/10ML
40 INJECTION, POWDER, LYOPHILIZED, FOR SOLUTION INTRAVENOUS
Status: COMPLETED | OUTPATIENT
Start: 2018-09-26 | End: 2018-09-26

## 2018-09-26 RX ORDER — PANTOPRAZOLE SODIUM 20 MG/1
20 TABLET, DELAYED RELEASE ORAL DAILY
COMMUNITY

## 2018-09-26 RX ORDER — ONDANSETRON 4 MG/1
8 TABLET, FILM COATED ORAL 2 TIMES DAILY
Status: ON HOLD | COMMUNITY
End: 2019-01-28 | Stop reason: HOSPADM

## 2018-09-26 RX ORDER — GABAPENTIN 600 MG/1
600 TABLET ORAL 3 TIMES DAILY
Status: ON HOLD | COMMUNITY
End: 2019-01-28 | Stop reason: HOSPADM

## 2018-09-26 RX ADMIN — LIDOCAINE HYDROCHLORIDE: 20 SOLUTION ORAL; TOPICAL at 06:09

## 2018-09-26 RX ADMIN — ONDANSETRON 4 MG: 2 INJECTION INTRAMUSCULAR; INTRAVENOUS at 06:09

## 2018-09-26 RX ADMIN — PANTOPRAZOLE SODIUM 40 MG: 40 INJECTION, POWDER, FOR SOLUTION INTRAVENOUS at 06:09

## 2018-09-26 RX ADMIN — SODIUM CHLORIDE 1000 ML: 0.9 INJECTION, SOLUTION INTRAVENOUS at 06:09

## 2018-09-26 NOTE — ED PROVIDER NOTES
Encounter Date: 9/26/2018    SCRIBE #1 NOTE: I, Patricia Taylor, am scribing for, and in the presence of,  Dr. Bills. I have scribed the entire note.       History     Chief Complaint   Patient presents with    Abdominal Pain     onset 2 days ago with nausea vomiting and diarrhea      Amaya Hall is a 52 y.o. female who presents to the ED with abdominal pain that started yesterday.   She describes sharp abdominal pain that has been constant since onset. She reports vomiting, nausea that has resolved, and diarrhea that began today. The patient comes to the ED often with similar symptoms. She states that GI cocktail usually relieves her symptoms. Patient denies abdominal surgeries in the past, back pain, alcohol use or any other concerning symptoms. The patient reports that she smokes a pack of cigarettes a day and is trying to quit.           The history is provided by the patient.     Review of patient's allergies indicates:   Allergen Reactions    Ultram [tramadol] Swelling     Past Medical History:   Diagnosis Date    Anxiety     COPD (chronic obstructive pulmonary disease)     Gastric ulcer, chronic     GERD (gastroesophageal reflux disease)     Hypercholesteremia     Hypertension      Past Surgical History:   Procedure Laterality Date    BACK SURGERY      ESOPHAGOGASTRODUODENOSCOPY (EGD) N/A 3/30/2017    Performed by Constantine Avila MD at [facility]     History reviewed. No pertinent family history.  Social History     Tobacco Use    Smoking status: Current Every Day Smoker     Packs/day: 1.00     Years: 43.00     Pack years: 43.00     Types: Cigarettes   Substance Use Topics    Alcohol use: No    Drug use: Yes     Types: Marijuana     Comment: occasional     Review of Systems   Constitutional: Negative for fever.   HENT: Negative for sore throat.    Cardiovascular: Negative for chest pain.   Gastrointestinal: Positive for abdominal pain, diarrhea, nausea and vomiting.   Musculoskeletal:  Negative for back pain.       Physical Exam     Initial Vitals [09/26/18 1741]   BP Pulse Resp Temp SpO2   (!) 172/97 98 18 97.7 °F (36.5 °C) 98 %      MAP       --         Physical Exam    Nursing note and vitals reviewed.  Constitutional: She appears well-developed and well-nourished. She is not diaphoretic. No distress.   Thin   HENT:   Head: Normocephalic and atraumatic.   Mouth/Throat: Oropharynx is clear and moist.   Eyes: Conjunctivae and EOM are normal. Pupils are equal, round, and reactive to light.   Neck: Normal range of motion. Neck supple.   Cardiovascular: Normal rate, regular rhythm and normal heart sounds. Exam reveals no gallop and no friction rub.    No murmur heard.  Pulmonary/Chest: Breath sounds normal. She has no wheezes. She has no rhonchi. She has no rales.   Abdominal: Soft. Bowel sounds are normal. She exhibits no distension. There is tenderness. There is no rebound and no guarding.   Mild epigastric tenderness   Musculoskeletal: Normal range of motion. She exhibits no edema or tenderness.   Lymphadenopathy:     She has no cervical adenopathy.   Neurological: She is alert and oriented to person, place, and time. She has normal strength.   Skin: Skin is warm and dry. Capillary refill takes less than 2 seconds. No rash noted.         ED Course   Procedures  Labs Reviewed   CBC W/ AUTO DIFFERENTIAL - Abnormal; Notable for the following components:       Result Value    Hemoglobin 17.4 (*)     Hematocrit 51.8 (*)     MCH 32.3 (*)     RDW 15.2 (*)     Lymph # 5.7 (*)     Mono # 1.4 (*)     All other components within normal limits   COMPREHENSIVE METABOLIC PANEL - Abnormal; Notable for the following components:    Potassium 3.2 (*)     Glucose 120 (*)     Calcium 10.7 (*)     All other components within normal limits   LIPASE   URINALYSIS, REFLEX TO URINE CULTURE          Imaging Results    None          Medical Decision Making:   Clinical Tests:   Lab Tests: Ordered and Reviewed                    ED Course as of Sep 26 1904   Wed Sep 26, 2018   1902 Patient states if she is not going to receive any Dilaudid or morphine she is going to the against medical advice.  Patient was instructed that she would not receive any narcotics for her epigastric pain and she left against medical advice.  She refused to sign the AMA paper.  [ST]      ED Course User Index  [ST] Keri Bills MD     Clinical Impression:     1. Chronic epigastric pain              I, Keri Bills, personally performed the services described in this documentation. All medical record entries made by the scribe were at my direction and in my presence.  I have reviewed the chart and agree that the record reflects my personal performance and is accurate and complete. Keri Bills M.D. 7:05 PM09/26/2018                 Keri Bills MD  09/26/18 1905

## 2018-09-27 NOTE — ED NOTES
"Pt requesting pain meds pt states " they normally give me Dilaudid and Morphine this other shit aint working I come here all the time for this." informed Dr. Bills of pt request for narcotic pain medications. Dr. Bills informs patient narcotic pain meds will not be given. Pt states " if she wont give me strong pain meds I am just going home take this stuff off me." pt refused to sign AMA form or dispo vitals. Pt left ed room 23 with steady gait resp even and non labored nadn.   "

## 2018-11-15 DIAGNOSIS — J45.909 ASTHMATIC BRONCHITIS: ICD-10-CM

## 2018-11-15 DIAGNOSIS — R06.2 WHEEZING: Primary | ICD-10-CM

## 2018-11-21 ENCOUNTER — HOSPITAL ENCOUNTER (OUTPATIENT)
Dept: PULMONOLOGY | Facility: HOSPITAL | Age: 53
Discharge: HOME | End: 2018-11-21
Attending: INTERNAL MEDICINE
Payer: MEDICAID

## 2018-11-21 DIAGNOSIS — J45.909 ASTHMATIC BRONCHITIS: ICD-10-CM

## 2018-11-21 DIAGNOSIS — R06.2 WHEEZING: ICD-10-CM

## 2018-11-21 PROCEDURE — 94729 DIFFUSING CAPACITY: CPT

## 2018-11-21 PROCEDURE — 94010 BREATHING CAPACITY TEST: CPT

## 2018-11-21 PROCEDURE — 94726 PLETHYSMOGRAPHY LUNG VOLUMES: CPT

## 2018-11-25 LAB
BRPFT: ABNORMAL
DLCO ADJ PRE: 10.42 ML/(MIN*MMHG) (ref 17.85–29.32)
DLCO SINGLE BREATH LLN: 5.98
DLCO SINGLE BREATH PRE REF: 44.2 %
DLCO SINGLE BREATH REF: 7.9
DLCOC SBVA LLN: 1.09
DLCOC SBVA PRE REF: 49.5 %
DLCOC SBVA REF: 1.59
DLCOC SINGLE BREATH LLN: 5.98
DLCOC SINGLE BREATH PRE REF: 44.2 %
DLCOC SINGLE BREATH REF: 7.9
DLCOVA LLN: 1.09
DLCOVA PRE REF: 49.5 %
DLCOVA PRE: 2.35 ML/(MIN*MMHG*L) (ref 3.25–6.24)
DLCOVA REF: 1.59
DLVAADJ PRE: 2.35 ML/(MIN*MMHG*L) (ref 3.25–6.24)
ERV LLN: 0.88
ERV PRE REF: 14.8 %
ERV REF: 0.88
ERVN2 LLN: 0.88
ERVN2 REF: 0.88
FEV1 FVC LLN: 68.37
FEV1 FVC PRE REF: 87.6 %
FEV1 FVC REF: 79.89
FEV1 LLN: 2.04
FEV1 PRE REF: 66.1 %
FEV1 REF: 2.65
FEV6 LLN: 2.66
FEV6 PRE REF: 74.4 %
FEV6 PRE: 2.49 L (ref 2.66–4.03)
FEV6 REF: 3.35
FRCN2 LLN: 1.88
FRCN2 REF: 2.71
FRCPLETH LLN: 1.88
FRCPLETH PREREF: 118.2 %
FRCPLETH REF: 2.71
FVC LLN: 2.57
FVC PRE REF: 75 %
FVC REF: 3.34
IVC PRE: 2.62 L (ref 2.57–4.1)
IVC SINGLE BREATH LLN: 2.57
IVC SINGLE BREATH PRE REF: 78.6 %
IVC SINGLE BREATH REF: 3.34
MFEF7525LLN: 1.35
MFEF7525PREREF: 45.1 %
MFEF7525REF: 2.5
MVV LLN: 83.76
MVV REF: 98.54
PEF LLN: 4.85
PEF PRE REF: 78.1 %
PEF REF: 6.57
PRE DLCO: 10.42 ML/(MIN*MMHG) (ref 17.85–29.32)
PRE ERV: 0.13 L (ref 0.88–0.88)
PRE FEF 25 75: 1.13 L/S (ref 1.35–3.66)
PRE FET 100: 6.98 SEC
PRE FEV1 FVC: 70 % (ref 68.37–91.4)
PRE FEV1: 1.75 L (ref 2.04–3.26)
PRE FRC PL: 3.2 L
PRE FVC: 2.5 L (ref 2.57–4.1)
PRE PEF: 5.13 L/S (ref 4.85–8.29)
PRE RV: 1.62 L (ref 1.25–2.41)
PRE TLC: 4.73 L (ref 3.98–5.96)
RAW LLN: 0.3
RAW PRE REF: 95.2 %
RAW PRE: 2.91 CMH2O*S/L (ref 3.06–3.06)
RAW REF: 0.3
RV LLN: 1.25
RV PRE REF: 88.5 %
RV REF: 1.83
RVN2 LLN: 1.25
RVN2 REF: 1.83
RVN2TLCN2 LLN: 28.07
RVN2TLCN2 REF: 37.66
RVTLC LLN: 28.07
RVTLC PRE REF: 90.9 %
RVTLC PRE: 34.25 % (ref 28.07–47.25)
RVTLC REF: 37.66
TLC LLN: 3.98
TLC PRE REF: 95.2 %
TLC REF: 4.97
TLCN2 LLN: 3.98
TLCN2 REF: 4.97
VA PRE: 4.44 L (ref 4.82–4.82)
VA SINGLE BREATH LLN: 4.82
VA SINGLE BREATH PRE REF: 92.2 %
VA SINGLE BREATH REF: 4.82
VC LLN: 2.57
VC PRE REF: 79.5 %
VC PRE: 2.65 L (ref 2.57–4.1)
VC REF: 3.34
VCMAXN2 LLN: 2.57
VCMAXN2 REF: 3.34
VTGRAWPRE: 4.29 L

## 2018-12-03 DIAGNOSIS — R91.1 NODULE OF RIGHT LUNG: Primary | ICD-10-CM

## 2018-12-11 ENCOUNTER — HOSPITAL ENCOUNTER (OUTPATIENT)
Dept: RADIOLOGY | Facility: HOSPITAL | Age: 53
Discharge: HOME | End: 2018-12-11
Attending: INTERNAL MEDICINE
Payer: MEDICAID

## 2018-12-11 DIAGNOSIS — R91.1 NODULE OF RIGHT LUNG: ICD-10-CM

## 2018-12-11 PROCEDURE — 71250 CT THORAX DX C-: CPT | Mod: TC

## 2018-12-11 PROCEDURE — 71250 CT THORAX DX C-: CPT | Mod: 26,,, | Performed by: RADIOLOGY

## 2019-01-15 ENCOUNTER — HOSPITAL ENCOUNTER (EMERGENCY)
Facility: HOSPITAL | Age: 54
Discharge: HOME | End: 2019-01-15
Attending: EMERGENCY MEDICINE
Payer: MEDICAID

## 2019-01-15 VITALS
TEMPERATURE: 98 F | OXYGEN SATURATION: 96 % | DIASTOLIC BLOOD PRESSURE: 106 MMHG | HEIGHT: 64 IN | HEART RATE: 113 BPM | BODY MASS INDEX: 20.66 KG/M2 | SYSTOLIC BLOOD PRESSURE: 145 MMHG | WEIGHT: 121 LBS | RESPIRATION RATE: 17 BRPM

## 2019-01-15 DIAGNOSIS — R00.0 TACHYCARDIA: ICD-10-CM

## 2019-01-15 DIAGNOSIS — R10.84 GENERALIZED ABDOMINAL DISCOMFORT: ICD-10-CM

## 2019-01-15 DIAGNOSIS — R06.2 WHEEZING: Primary | ICD-10-CM

## 2019-01-15 LAB
ALBUMIN SERPL BCP-MCNC: 4.6 G/DL
ALP SERPL-CCNC: 95 U/L
ALT SERPL W/O P-5'-P-CCNC: 29 U/L
AMPHET+METHAMPHET UR QL: NEGATIVE
ANION GAP SERPL CALC-SCNC: 14 MMOL/L
AST SERPL-CCNC: 34 U/L
BARBITURATES UR QL SCN>200 NG/ML: NEGATIVE
BASOPHILS # BLD AUTO: 0.03 K/UL
BASOPHILS NFR BLD: 0.1 %
BENZODIAZ UR QL SCN>200 NG/ML: NEGATIVE
BILIRUB SERPL-MCNC: 0.6 MG/DL
BILIRUB UR QL STRIP: NEGATIVE
BUN SERPL-MCNC: 10 MG/DL
BZE UR QL SCN: NEGATIVE
CALCIUM SERPL-MCNC: 10.5 MG/DL
CANNABINOIDS UR QL SCN: NEGATIVE
CHLORIDE SERPL-SCNC: 103 MMOL/L
CLARITY UR: CLEAR
CO2 SERPL-SCNC: 23 MMOL/L
COLOR UR: YELLOW
CREAT SERPL-MCNC: 0.9 MG/DL
CREAT UR-MCNC: 19.1 MG/DL
CRP SERPL-MCNC: 1.3 MG/L
DIFFERENTIAL METHOD: ABNORMAL
EOSINOPHIL # BLD AUTO: 0.1 K/UL
EOSINOPHIL NFR BLD: 0.4 %
ERYTHROCYTE [DISTWIDTH] IN BLOOD BY AUTOMATED COUNT: 13.1 %
EST. GFR  (AFRICAN AMERICAN): >60 ML/MIN/1.73 M^2
EST. GFR  (NON AFRICAN AMERICAN): >60 ML/MIN/1.73 M^2
GLUCOSE SERPL-MCNC: 110 MG/DL
GLUCOSE UR QL STRIP: NEGATIVE
HCT VFR BLD AUTO: 53.1 %
HGB BLD-MCNC: 17.7 G/DL
HGB UR QL STRIP: NEGATIVE
KETONES UR QL STRIP: NEGATIVE
LACTATE SERPL-SCNC: 1.4 MMOL/L
LEUKOCYTE ESTERASE UR QL STRIP: NEGATIVE
LIPASE SERPL-CCNC: 16 U/L
LYMPHOCYTES # BLD AUTO: 3.7 K/UL
LYMPHOCYTES NFR BLD: 17.5 %
MCH RBC QN AUTO: 33 PG
MCHC RBC AUTO-ENTMCNC: 33.3 G/DL
MCV RBC AUTO: 99 FL
METHADONE UR QL SCN>300 NG/ML: NEGATIVE
MONOCYTES # BLD AUTO: 1.2 K/UL
MONOCYTES NFR BLD: 5.8 %
NEUTROPHILS # BLD AUTO: 16.1 K/UL
NEUTROPHILS NFR BLD: 75.8 %
NITRITE UR QL STRIP: NEGATIVE
OB PNL STL: NEGATIVE
OPIATES UR QL SCN: NORMAL
PCP UR QL SCN>25 NG/ML: NEGATIVE
PH UR STRIP: 7 [PH] (ref 5–8)
PLATELET # BLD AUTO: 297 K/UL
PMV BLD AUTO: 9.4 FL
POTASSIUM SERPL-SCNC: 3.5 MMOL/L
PROT SERPL-MCNC: 8.3 G/DL
PROT UR QL STRIP: NEGATIVE
RBC # BLD AUTO: 5.36 M/UL
SODIUM SERPL-SCNC: 140 MMOL/L
SP GR UR STRIP: <=1.005 (ref 1–1.03)
TOXICOLOGY INFORMATION: NORMAL
TROPONIN I SERPL DL<=0.01 NG/ML-MCNC: <0.006 NG/ML
URN SPEC COLLECT METH UR: ABNORMAL
UROBILINOGEN UR STRIP-ACNC: NEGATIVE EU/DL
WBC # BLD AUTO: 21.26 K/UL

## 2019-01-15 PROCEDURE — 80307 DRUG TEST PRSMV CHEM ANLYZR: CPT

## 2019-01-15 PROCEDURE — 25000003 PHARM REV CODE 250: Performed by: PHYSICIAN ASSISTANT

## 2019-01-15 PROCEDURE — 87040 BLOOD CULTURE FOR BACTERIA: CPT

## 2019-01-15 PROCEDURE — 87076 CULTURE ANAEROBE IDENT EACH: CPT

## 2019-01-15 PROCEDURE — 81003 URINALYSIS AUTO W/O SCOPE: CPT | Mod: 59

## 2019-01-15 PROCEDURE — 94640 AIRWAY INHALATION TREATMENT: CPT

## 2019-01-15 PROCEDURE — 83605 ASSAY OF LACTIC ACID: CPT

## 2019-01-15 PROCEDURE — 25000003 PHARM REV CODE 250: Performed by: EMERGENCY MEDICINE

## 2019-01-15 PROCEDURE — 80053 COMPREHEN METABOLIC PANEL: CPT

## 2019-01-15 PROCEDURE — 93010 ELECTROCARDIOGRAM REPORT: CPT | Mod: ,,, | Performed by: INTERNAL MEDICINE

## 2019-01-15 PROCEDURE — 25500020 PHARM REV CODE 255: Performed by: EMERGENCY MEDICINE

## 2019-01-15 PROCEDURE — 84484 ASSAY OF TROPONIN QUANT: CPT

## 2019-01-15 PROCEDURE — 83690 ASSAY OF LIPASE: CPT

## 2019-01-15 PROCEDURE — 85025 COMPLETE CBC W/AUTO DIFF WBC: CPT

## 2019-01-15 PROCEDURE — 96375 TX/PRO/DX INJ NEW DRUG ADDON: CPT

## 2019-01-15 PROCEDURE — 25000242 PHARM REV CODE 250 ALT 637 W/ HCPCS: Performed by: PHYSICIAN ASSISTANT

## 2019-01-15 PROCEDURE — 63600175 PHARM REV CODE 636 W HCPCS: Performed by: PHYSICIAN ASSISTANT

## 2019-01-15 PROCEDURE — 96365 THER/PROPH/DIAG IV INF INIT: CPT

## 2019-01-15 PROCEDURE — 93010 EKG 12-LEAD: ICD-10-PCS | Mod: ,,, | Performed by: INTERNAL MEDICINE

## 2019-01-15 PROCEDURE — 82272 OCCULT BLD FECES 1-3 TESTS: CPT

## 2019-01-15 PROCEDURE — 63600175 PHARM REV CODE 636 W HCPCS: Performed by: EMERGENCY MEDICINE

## 2019-01-15 PROCEDURE — 99285 EMERGENCY DEPT VISIT HI MDM: CPT | Mod: 25

## 2019-01-15 PROCEDURE — 96361 HYDRATE IV INFUSION ADD-ON: CPT

## 2019-01-15 PROCEDURE — 96376 TX/PRO/DX INJ SAME DRUG ADON: CPT

## 2019-01-15 PROCEDURE — 93005 ELECTROCARDIOGRAM TRACING: CPT

## 2019-01-15 PROCEDURE — 86140 C-REACTIVE PROTEIN: CPT

## 2019-01-15 RX ORDER — ONDANSETRON 2 MG/ML
4 INJECTION INTRAMUSCULAR; INTRAVENOUS
Status: COMPLETED | OUTPATIENT
Start: 2019-01-15 | End: 2019-01-15

## 2019-01-15 RX ORDER — MORPHINE SULFATE 4 MG/ML
4 INJECTION, SOLUTION INTRAMUSCULAR; INTRAVENOUS
Status: COMPLETED | OUTPATIENT
Start: 2019-01-15 | End: 2019-01-15

## 2019-01-15 RX ORDER — IPRATROPIUM BROMIDE AND ALBUTEROL SULFATE 2.5; .5 MG/3ML; MG/3ML
3 SOLUTION RESPIRATORY (INHALATION)
Status: COMPLETED | OUTPATIENT
Start: 2019-01-15 | End: 2019-01-15

## 2019-01-15 RX ORDER — HYDRALAZINE HYDROCHLORIDE 20 MG/ML
10 INJECTION INTRAMUSCULAR; INTRAVENOUS
Status: COMPLETED | OUTPATIENT
Start: 2019-01-15 | End: 2019-01-15

## 2019-01-15 RX ORDER — ONDANSETRON 4 MG/1
4 TABLET, ORALLY DISINTEGRATING ORAL EVERY 8 HOURS PRN
Qty: 12 TABLET | Refills: 0 | Status: ON HOLD | OUTPATIENT
Start: 2019-01-15 | End: 2019-01-28 | Stop reason: HOSPADM

## 2019-01-15 RX ORDER — DICYCLOMINE HYDROCHLORIDE 20 MG/1
20 TABLET ORAL 4 TIMES DAILY PRN
Qty: 20 TABLET | Refills: 0 | Status: SHIPPED | OUTPATIENT
Start: 2019-01-15 | End: 2019-02-14

## 2019-01-15 RX ORDER — HYDROMORPHONE HYDROCHLORIDE 1 MG/ML
1 INJECTION, SOLUTION INTRAMUSCULAR; INTRAVENOUS; SUBCUTANEOUS
Status: COMPLETED | OUTPATIENT
Start: 2019-01-15 | End: 2019-01-15

## 2019-01-15 RX ORDER — CAPSAICIN 0.03 G/100G
CREAM TOPICAL 2 TIMES DAILY PRN
Qty: 45 G | Refills: 0 | Status: SHIPPED | OUTPATIENT
Start: 2019-01-15 | End: 2019-01-22

## 2019-01-15 RX ORDER — IPRATROPIUM BROMIDE AND ALBUTEROL SULFATE 2.5; .5 MG/3ML; MG/3ML
3 SOLUTION RESPIRATORY (INHALATION)
Status: DISCONTINUED | OUTPATIENT
Start: 2019-01-15 | End: 2019-01-15

## 2019-01-15 RX ADMIN — HYDROMORPHONE HYDROCHLORIDE 1 MG: 1 INJECTION, SOLUTION INTRAMUSCULAR; INTRAVENOUS; SUBCUTANEOUS at 10:01

## 2019-01-15 RX ADMIN — HYDRALAZINE HYDROCHLORIDE 10 MG: 20 INJECTION INTRAMUSCULAR; INTRAVENOUS at 10:01

## 2019-01-15 RX ADMIN — MORPHINE SULFATE 4 MG: 4 INJECTION INTRAVENOUS at 10:01

## 2019-01-15 RX ADMIN — IPRATROPIUM BROMIDE AND ALBUTEROL SULFATE 3 ML: .5; 3 SOLUTION RESPIRATORY (INHALATION) at 01:01

## 2019-01-15 RX ADMIN — ONDANSETRON 4 MG: 2 INJECTION INTRAMUSCULAR; INTRAVENOUS at 04:01

## 2019-01-15 RX ADMIN — ONDANSETRON 4 MG: 2 INJECTION INTRAMUSCULAR; INTRAVENOUS at 10:01

## 2019-01-15 RX ADMIN — SODIUM CHLORIDE 1000 ML: 0.9 INJECTION, SOLUTION INTRAVENOUS at 01:01

## 2019-01-15 RX ADMIN — SODIUM CHLORIDE 500 ML: 0.9 INJECTION, SOLUTION INTRAVENOUS at 10:01

## 2019-01-15 RX ADMIN — PIPERACILLIN AND TAZOBACTAM 4.5 G: 4; .5 INJECTION, POWDER, LYOPHILIZED, FOR SOLUTION INTRAVENOUS; PARENTERAL at 11:01

## 2019-01-15 RX ADMIN — HYDROMORPHONE HYDROCHLORIDE 1 MG: 1 INJECTION, SOLUTION INTRAMUSCULAR; INTRAVENOUS; SUBCUTANEOUS at 07:01

## 2019-01-15 RX ADMIN — IOHEXOL 75 ML: 350 INJECTION, SOLUTION INTRAVENOUS at 11:01

## 2019-01-15 RX ADMIN — IOHEXOL 30 ML: 350 INJECTION, SOLUTION INTRAVENOUS at 04:01

## 2019-01-15 RX ADMIN — HYDROMORPHONE HYDROCHLORIDE 1 MG: 1 INJECTION, SOLUTION INTRAMUSCULAR; INTRAVENOUS; SUBCUTANEOUS at 03:01

## 2019-01-15 NOTE — ED PROVIDER NOTES
Encounter Date: 1/15/2019       History     Chief Complaint   Patient presents with    Abdominal Pain     abd pain for 3days. denies n/v      Amaya Hall, a 53 y.o. female  has a past medical history of Anxiety, COPD (chronic obstructive pulmonary disease), Gastric ulcer, chronic, GERD (gastroesophageal reflux disease), Hypercholesteremia, and Hypertension.     She presents to the ED for evaluation of N/V and abdominal pain x 3 days that has gotten significantly worse over the last day.  Patient denies any blood in vomitus or stool.  No history of abdominal surgeries.  No treatments tried for abdominal pain or vomiting.  Pain is stabbing in nature and worse with movement or touch.  Denies any burning with urination, vaginal bleeding, vaginal discharge.            The history is provided by the patient.     Review of patient's allergies indicates:   Allergen Reactions    Ultram [tramadol] Swelling     Past Medical History:   Diagnosis Date    Anxiety     COPD (chronic obstructive pulmonary disease)     Gastric ulcer, chronic     GERD (gastroesophageal reflux disease)     Hypercholesteremia     Hypertension      Past Surgical History:   Procedure Laterality Date    BACK SURGERY      ESOPHAGOGASTRODUODENOSCOPY (EGD) N/A 3/30/2017    Performed by Constantine Avila MD at [facility]     No family history on file.  Social History     Tobacco Use    Smoking status: Current Every Day Smoker     Packs/day: 1.00     Years: 43.00     Pack years: 43.00     Types: Cigarettes   Substance Use Topics    Alcohol use: No    Drug use: Yes     Types: Marijuana     Comment: occasional     Review of Systems   Constitutional: Negative for fever.   Gastrointestinal: Positive for abdominal pain, nausea and vomiting.   Skin: Negative for color change.   Neurological: Negative for dizziness.   Psychiatric/Behavioral: Negative for agitation.   All other systems reviewed and are negative.      Physical Exam     Initial  Vitals [01/15/19 1007]   BP Pulse Resp Temp SpO2   (!) 205/100 83 20 97.7 °F (36.5 °C) 97 %      MAP       --         Physical Exam    Nursing note and vitals reviewed.  Constitutional: She appears well-developed and well-nourished. She is not diaphoretic. No distress.   HENT:   Head: Normocephalic and atraumatic.   Right Ear: External ear normal.   Left Ear: External ear normal.   Nose: Nose normal.   Mouth/Throat: Oropharynx is clear and moist.   Eyes: Conjunctivae and EOM are normal.   Neck: Normal range of motion.   Cardiovascular: Normal rate, regular rhythm and normal heart sounds.   Pulmonary/Chest: Breath sounds normal. No respiratory distress. She has no wheezes. She has no rhonchi. She has no rales. She exhibits no tenderness.   Abdominal: Soft. Bowel sounds are normal. She exhibits no distension. There is tenderness in the right lower quadrant and suprapubic area. There is guarding. There is no rigidity, no rebound, no CVA tenderness, no tenderness at McBurney's point and negative Wade's sign.   Musculoskeletal: Normal range of motion.   Neurological: She is alert and oriented to person, place, and time.   Skin: Skin is warm and dry. Capillary refill takes less than 2 seconds. No rash noted. No erythema.   Psychiatric: She has a normal mood and affect. Thought content normal.         ED Course   Procedures  Labs Reviewed   CBC W/ AUTO DIFFERENTIAL - Abnormal; Notable for the following components:       Result Value    WBC 21.26 (*)     Hemoglobin 17.7 (*)     Hematocrit 53.1 (*)     MCV 99 (*)     MCH 33.0 (*)     Gran # (ANC) 16.1 (*)     Mono # 1.2 (*)     Gran% 75.8 (*)     Lymph% 17.5 (*)     All other components within normal limits   URINALYSIS, REFLEX TO URINE CULTURE - Abnormal; Notable for the following components:    Specific Gravity, UA <=1.005 (*)     All other components within normal limits    Narrative:     Preferred Collection Type->Urine, Clean Catch   CULTURE, BLOOD   CULTURE, BLOOD    COMPREHENSIVE METABOLIC PANEL   LIPASE   DRUG SCREEN PANEL, URINE EMERGENCY    Narrative:     Preferred Collection Type->Urine, Clean Catch   C-REACTIVE PROTEIN   LACTIC ACID, PLASMA   LACTIC ACID, PLASMA   C-REACTIVE PROTEIN   TROPONIN I   TROPONIN I   OCCULT BLOOD X 1, STOOL          Imaging Results          X-Ray Chest AP Portable (Final result)  Result time 01/15/19 13:10:54    Final result by Jakub Whaley MD (01/15/19 13:10:54)                 Impression:      1. Radiographic findings of COPD with irregular subsegmental opacity projecting over the right upper lung zone, not significantly changed when compared to recent prior chest CT allowing for differences in technique.  No new consolidation.      Electronically signed by: Jakub Whaley MD  Date:    01/15/2019  Time:    13:10             Narrative:    EXAMINATION:  XR CHEST AP PORTABLE    CLINICAL HISTORY:  cough; Wheezing    TECHNIQUE:  Single frontal view of the chest was performed.    COMPARISON:  CT 12/11/2018.    FINDINGS:  Cardiac monitoring leads project over the bilateral hemithoraces.  Mediastinal structures are midline.  Hilar contours are unremarkable.  Cardiac silhouette is normal in size.  Lungs are overexpanded but symmetric.  Subsegmental irregular opacity projects over the right upper lung zone, better evaluated on previous CT.  No new focal consolidation.  No pneumothorax or pleural effusions.  No free air beneath the diaphragm.  No acute osseous abnormalities.  Visualized subcutaneous soft tissues are unremarkable.                               CT Abdomen Pelvis With Contrast (Final result)  Result time 01/15/19 12:05:17    Final result by Doris Brennan MD (01/15/19 12:05:17)                 Impression:      The appendix is seen and appears normal.  There is a small appendicoliths.  Just inferior to the appendix, in the right pelvic region, there is a small area of fluid and air bubbles possibly in the confines of bowel.  It  is difficult to delineate the bowel wall due to the lack of oral contrast administration, if symptoms persist consider repeat exam with oral contrast.      Electronically signed by: Doris Brennan MD  Date:    01/15/2019  Time:    12:05             Narrative:    EXAMINATION:  CT ABDOMEN PELVIS WITH CONTRAST    CLINICAL HISTORY:  rlq abdominal pain;    TECHNIQUE:  Low dose axial images, sagittal and coronal reformations were obtained from the lung bases to the pubic symphysis following the IV administration of 75 mL of Omnipaque 350 .  Oral contrast was not given.    COMPARISON:  07/29/2014    FINDINGS:  Emphysema.  No pleural effusion.  No liver lesions.  The gallbladder is present.  The stomach, pancreas, spleen, adrenal glands and bilateral kidneys appear normal.  There is moderate atherosclerotic changes of the aorta and iliac vessels, it tapers normally.  Mild stool retention, no definite inflammatory changes of the bowel seen, no bowel dilation. I suspect a small appendicolith .  The appendix is seen of normal caliber, there is a high density material within, no definite adjacent inflammatory change.  There is a trace of fluid and air bubbles just inferior to the appendix, it is favored to be in the confines of nondistended bowel rather than free fluid / free air.  The bowel wall is not very distinct.  No oral contrast given to better opacified/delineate the loops of is bowel.  The bladder appears normal.  The uterus appears small.  Postoperative changes of posterior instrumented fusion L5-S1 with listhesis noted of L5-S1.  The ovaries are not well seen, they may be small.                                 Medical Decision Making:   Initial Assessment:   Abdominal pain, N/V   Differential Diagnosis:   Appendicitis, diverticulitis, UTI, pyelonephritis   Clinical Tests:   Lab Tests: Ordered and Reviewed  The following lab test(s) were unremarkable: CBC and CMP  Radiological Study: Ordered and Reviewed  ED  Management:  Patient presents to ED for evaluation of abdominal pain, N/V.  TTP in suprapubic region and RLQ with guarding noted.  WBC is 21K.  No other concerning abnormalities noted to lab work.  General surgery notified of our concern for appendicitis in the ED, awaiting CT scan for further instruction.  CT shows the appendix is seen and appears normal.  There is a small appendicoliths.  Just inferior to the appendix, in the right pelvic region, there is a small area of fluid and air bubbles possibly in the confines of bowel.  It is difficult to delineate the bowel wall due to the lack of oral contrast administration, if symptoms persist consider repeat exam with oral contrast.  Patient was assessed by Dr. Díaz who stated that he did not feel that this was an acute surgical emergency and that CT with oral contrast may give more information.  Javier consulted and agrees with birgit heltonining.  CT with PO contrast ordered.  CT with oral contrast gave no more information than original CT.  [facility] internal med consulted.  At this time care was turned over to Dr. Nathan.                       ED Course as of Jan 16 0922   Tue Alexandre 15, 2019   1408 Opiate Scrn, Ur: Presumptive Positive [RN]   1957 Evaluate the patient with [facility] internal medicine team.  Patient would like to go home discussed admission for observation given leukocytosis however patient would like to leave.  Patient instructed to follow up with GI and will discharge on Bentyl.  [RN]   2000 On re-evaluation patient's vital signs are stable heart rate is in the 90s her pain is controlled.  [RN]      ED Course User Index  [RN] Alonso Nathan Jr., MD     Clinical Impression:   The primary encounter diagnosis was Wheezing. Diagnoses of Tachycardia and Generalized abdominal discomfort were also pertinent to this visit.                             Isabel Delgado PA-C  01/16/19 0003

## 2019-01-15 NOTE — ED NOTES
Pt presents to the ED w/ c/ of abd pain for the past 3 days. Pt is actively vomiting and dry heaving. Pt reports intermittent nausea. Pt reports that she had a BM earlier today and she said it was normal. Pt denies chest pain or sob at this time.

## 2019-01-15 NOTE — ED NOTES
Spoke to CT. Pt goes to CT scan 1hr after finishing the 2nd oral contrast. Pt finished oral contrast at this time.

## 2019-01-15 NOTE — CONSULTS
[facility]/General Surgery  Consultation Note    SUBJECTIVE:     Reason for Consultation:   Abdominal pain    History of Present Illness:  Patient is a 52 yo F w/ PMH of HTN, emphysema, current workup for lung nodule, HLD, s/p lumbar spinal surgery, who presents for evaluation of abdominal pain. Patient states that over the last two weeks, she has experienced some URI symptoms. Over the past few days, began to experience some lower abdominal pain, crampy in nature, suprapubic/RLQ, with assc nausea and emesis as well as some diarrhea that she is treating with immodium. She denies any fevers/chills. Appetite declined moderately due to nausea. Denies any urinary complaints. Denies any blood/mucus per rectum. States had colonoscopy and EGD in November without abnormal findings.    Allergies:  Review of patient's allergies indicates:   Allergen Reactions    Ultram [tramadol] Swelling       Home Medications:  No current facility-administered medications on file prior to encounter.      Current Outpatient Medications on File Prior to Encounter   Medication Sig    alum-mag hydroxide-simeth 225-200-25 mg/5 mL Susp Take as directed on bottle    amitriptyline (ELAVIL) 25 MG tablet Take one pill by mouth nightly for pain.    atorvastatin (LIPITOR) 80 MG tablet Take 80 mg by mouth once daily.    budesonide-formoterol 160-4.5 mcg (SYMBICORT) 160-4.5 mcg/actuation HFAA Inhale 2 puffs into the lungs every 12 (twelve) hours.    dicyclomine (BENTYL) 20 mg tablet Take 1 tablet (20 mg total) by mouth every 6 (six) hours.    duloxetine (CYMBALTA) 30 MG capsule Take 30 mg by mouth once daily.    esomeprazole magnesium (NEXIUM 24HR) 20 mg TbEC Take 20 mg by mouth once daily.    gabapentin (NEURONTIN) 600 MG tablet Take 600 mg by mouth 3 (three) times daily.    gabapentin (NEURONTIN) 600 MG tablet Take 600 mg by mouth 3 (three) times daily.    hyoscyamine (LEVSIN/SL) 0.125 mg Subl Place 1 tablet (0.125 mg total)  under the tongue every 8 (eight) hours as needed (abdominal pain).    lisinopril-hydrochlorothiazide (PRINZIDE,ZESTORETIC) 20-25 mg Tab Take 1 tablet by mouth once daily.    nicotine (NICODERM CQ) 21 mg/24 hr Place 1 patch onto the skin once daily.    ondansetron (ZOFRAN) 4 MG tablet Take 1 tablet (4 mg total) by mouth every 8 (eight) hours as needed.    ondansetron (ZOFRAN) 4 MG tablet Take 8 mg by mouth 2 (two) times daily.    pantoprazole (PROTONIX) 20 MG tablet Take 20 mg by mouth once daily.    potassium chloride SA (K-DUR,KLOR-CON) 20 MEQ tablet Take 1 tablet (20 mEq total) by mouth once daily.    ranitidine (ZANTAC) 150 MG tablet TK 1 T PO HS    SPIRIVA WITH HANDIHALER 18 mcg inhalation capsule INHALE 1 C PO QD    sucralfate (CARAFATE) 100 mg/mL suspension Take 10 mLs (1 g total) by mouth 4 (four) times daily with meals and nightly.    VENTOLIN HFA 90 mcg/actuation inhaler INL 2 PFS PO Q 4 H PRN       Past Medical History:   Diagnosis Date    Anxiety     COPD (chronic obstructive pulmonary disease)     Gastric ulcer, chronic     GERD (gastroesophageal reflux disease)     Hypercholesteremia     Hypertension      Past Surgical History:   Procedure Laterality Date    BACK SURGERY      ESOPHAGOGASTRODUODENOSCOPY (EGD) N/A 3/30/2017    Performed by Constantine Avila MD at [facility]     No family history on file.  Social History     Tobacco Use    Smoking status: Current Every Day Smoker     Packs/day: 1.00     Years: 43.00     Pack years: 43.00     Types: Cigarettes   Substance Use Topics    Alcohol use: No    Drug use: Yes     Types: Marijuana     Comment: occasional        Review of Systems:  12 pt ROS negative except for stated above    OBJECTIVE:     Vital Signs:  Temp: 97.9 °F (36.6 °C) (01/15/19 1313)  Pulse: 110 (01/15/19 1326)  Resp: (!) 26 (01/15/19 1326)  BP: (!) 160/86 (01/15/19 1313)  SpO2: 96 % (01/15/19 1326)    Physical Exam:  General: well developed, well nourished, no  distress  HEENT: normocephalic, atraumatic, hearing grossly normal bilaterally, mucous membranes moist, EOM intact, no scleral icterus  Neck: supple, symmetrical, trachea midline, no JVD  Lungs:  clear to auscultation bilaterally and normal respiratory effort  Cardiovascular: regular rate and rhythm.    Extremities: no cyanosis or edema, or clubbing, distal pulses palpable and symmetric  Abdomen: soft, minimally TTP in suprapubic area, ND, no peritoneal signs, no rebound/guarding  Skin: Skin color, texture, turgor normal. No rashes or lesions  Musculoskeletal:no clubbing, cyanosis, no deformities  Neurologic: No focal numbness or weakness  Psych/Behavioral:  Alert and oriented, appropriate affect.    Laboratory:  Labs Reviewed   CBC W/ AUTO DIFFERENTIAL - Abnormal; Notable for the following components:       Result Value    WBC 21.26 (*)     Hemoglobin 17.7 (*)     Hematocrit 53.1 (*)     MCV 99 (*)     MCH 33.0 (*)     Gran # (ANC) 16.1 (*)     Mono # 1.2 (*)     Gran% 75.8 (*)     Lymph% 17.5 (*)     All other components within normal limits   CULTURE, BLOOD   CULTURE, BLOOD   COMPREHENSIVE METABOLIC PANEL   LIPASE   C-REACTIVE PROTEIN   LACTIC ACID, PLASMA   LACTIC ACID, PLASMA   C-REACTIVE PROTEIN   URINALYSIS, REFLEX TO URINE CULTURE   DRUG SCREEN PANEL, URINE EMERGENCY       Diagnostic Results:  Imaging Results          X-Ray Chest AP Portable (Final result)  Result time 01/15/19 13:10:54    Final result by Jakub Whaley MD (01/15/19 13:10:54)                 Impression:      1. Radiographic findings of COPD with irregular subsegmental opacity projecting over the right upper lung zone, not significantly changed when compared to recent prior chest CT allowing for differences in technique.  No new consolidation.      Electronically signed by: Jakub Whaley MD  Date:    01/15/2019  Time:    13:10             Narrative:    EXAMINATION:  XR CHEST AP PORTABLE    CLINICAL HISTORY:  cough;  Wheezing    TECHNIQUE:  Single frontal view of the chest was performed.    COMPARISON:  CT 12/11/2018.    FINDINGS:  Cardiac monitoring leads project over the bilateral hemithoraces.  Mediastinal structures are midline.  Hilar contours are unremarkable.  Cardiac silhouette is normal in size.  Lungs are overexpanded but symmetric.  Subsegmental irregular opacity projects over the right upper lung zone, better evaluated on previous CT.  No new focal consolidation.  No pneumothorax or pleural effusions.  No free air beneath the diaphragm.  No acute osseous abnormalities.  Visualized subcutaneous soft tissues are unremarkable.                               CT Abdomen Pelvis With Contrast (Final result)  Result time 01/15/19 12:05:17    Final result by Doris Brennan MD (01/15/19 12:05:17)                 Impression:      The appendix is seen and appears normal.  There is a small appendicoliths.  Just inferior to the appendix, in the right pelvic region, there is a small area of fluid and air bubbles possibly in the confines of bowel.  It is difficult to delineate the bowel wall due to the lack of oral contrast administration, if symptoms persist consider repeat exam with oral contrast.      Electronically signed by: Doris Brennan MD  Date:    01/15/2019  Time:    12:05             Narrative:    EXAMINATION:  CT ABDOMEN PELVIS WITH CONTRAST    CLINICAL HISTORY:  rlq abdominal pain;    TECHNIQUE:  Low dose axial images, sagittal and coronal reformations were obtained from the lung bases to the pubic symphysis following the IV administration of 75 mL of Omnipaque 350 .  Oral contrast was not given.    COMPARISON:  07/29/2014    FINDINGS:  Emphysema.  No pleural effusion.  No liver lesions.  The gallbladder is present.  The stomach, pancreas, spleen, adrenal glands and bilateral kidneys appear normal.  There is moderate atherosclerotic changes of the aorta and iliac vessels, it tapers normally.  Mild stool  retention, no definite inflammatory changes of the bowel seen, no bowel dilation. I suspect a small appendicolith .  The appendix is seen of normal caliber, there is a high density material within, no definite adjacent inflammatory change.  There is a trace of fluid and air bubbles just inferior to the appendix, it is favored to be in the confines of nondistended bowel rather than free fluid / free air.  The bowel wall is not very distinct.  No oral contrast given to better opacified/delineate the loops of is bowel.  The bladder appears normal.  The uterus appears small.  Postoperative changes of posterior instrumented fusion L5-S1 with listhesis noted of L5-S1.  The ovaries are not well seen, they may be small.                                ASSESSMENT:     52 yo F w/ leukocytosis, URI symptoms and abdominal pain, likely mesenteric adenitis    PLAN:     No acute surgical intervention at this time  History and findings not consistent with acute appendicitis or intra-abdominal abscess/diverticulitis  Would recommend medicine evaluation for admission and workup

## 2019-01-16 NOTE — ED NOTES
Report received, pt care assumed. NAD, VSS, CR monitor on, alarms audible. Pt lying on stretcher, c/o 7/10 abdominal pain, pt crying and upset about time spent in ED. Pt wanting to know if she will be admitted to hospital. RN apologized to pt, updated pt on test result wait time and admission plan. MIKE Cannon notified of pt needing pain medication. Awaiting further orders.

## 2019-01-16 NOTE — CONSULTS
[facility] Internal Medicine Consult note - Resident Note    Admitting Team: [facility] Team B  Attending Physician: Javeir  Resident: Oz  Interns: Curtis     Date of Consult: 1/15/2019    Chief Complaint     Abdominal Pain (abd pain for 3days. denies n/v )   for 1 week    Subjective:      History of Present Illness:  Amaya Hall is a 53 y.o. female who  has a past medical history of Anxiety, COPD (chronic obstructive pulmonary disease), Gastric ulcer, chronic, GERD (gastroesophageal reflux disease), Hypercholesteremia, and Hypertension.. The patient presented to [facility] on 1/15/2019 with a primary complaint of Abdominal Pain (abd pain for 3days. denies n/v )    Patient is a 53 y.o. Female with PMH of GERD, COPD, depression, HTN and chronic abdominal pain been evaluated several time in the past with last EGD last month which was normal. Patient presented with 1 week history of lower right abdominal pain crampy in nature. Patient stated that she has been having this pain for a week on and off. She was also having some nausea but no vomiting or diarrhea. She denies dark stools or epigastric pain. She also denies colicky pain, dysuria, flank pain, urgency or hesitancy. She cant think of any trigger. No fever or chills. She denies CP, SOB, headaches or blurry vision.     Past Medical History:  Past Medical History:   Diagnosis Date    Anxiety     COPD (chronic obstructive pulmonary disease)     Gastric ulcer, chronic     GERD (gastroesophageal reflux disease)     Hypercholesteremia     Hypertension        Past Surgical History:  Past Surgical History:   Procedure Laterality Date    BACK SURGERY      ESOPHAGOGASTRODUODENOSCOPY (EGD) N/A 3/30/2017    Performed by Constantine Avila MD at [facility] ENDO       Allergies:  Review of patient's allergies indicates:   Allergen Reactions    Ultram [tramadol] Swelling       Home Medications:  Prior to Admission medications    Medication Sig Start Date End  Date Taking? Authorizing Provider   alum-mag hydroxide-simeth 225-200-25 mg/5 mL Susp Take as directed on bottle 11/27/15   Sarthak Amos MD   amitriptyline (ELAVIL) 25 MG tablet Take one pill by mouth nightly for pain. 4/13/17   Constantine Avila MD   atorvastatin (LIPITOR) 80 MG tablet Take 80 mg by mouth once daily.    Historical Provider, MD   budesonide-formoterol 160-4.5 mcg (SYMBICORT) 160-4.5 mcg/actuation HFAA Inhale 2 puffs into the lungs every 12 (twelve) hours.    Historical Provider, MD   capsaicin (ZOSTRIX) 0.025 % cream Apply topically 2 (two) times daily as needed. Abdominal pain    Please wash hands or  wear gloves with use 1/15/19 1/22/19  Alonso Nathan Jr., MD   dicyclomine (BENTYL) 20 mg tablet Take 1 tablet (20 mg total) by mouth 4 (four) times daily as needed (gi distress). 1/15/19 2/14/19  Alonso Nathan Jr., MD   duloxetine (CYMBALTA) 30 MG capsule Take 30 mg by mouth once daily.    Historical Provider, MD   esomeprazole magnesium (NEXIUM 24HR) 20 mg TbEC Take 20 mg by mouth once daily. 3/1/17   Constantine Avila MD   gabapentin (NEURONTIN) 600 MG tablet Take 600 mg by mouth 3 (three) times daily.    Historical Provider, MD   gabapentin (NEURONTIN) 600 MG tablet Take 600 mg by mouth 3 (three) times daily.    Historical Provider, MD   hyoscyamine (LEVSIN/SL) 0.125 mg Subl Place 1 tablet (0.125 mg total) under the tongue every 8 (eight) hours as needed (abdominal pain). 3/1/17   Constantine Avila MD   lisinopril-hydrochlorothiazide (PRINZIDE,ZESTORETIC) 20-25 mg Tab Take 1 tablet by mouth once daily. 2/5/15 2/5/16  Pollo Christian MD   nicotine (NICODERM CQ) 21 mg/24 hr Place 1 patch onto the skin once daily. 12/21/16   Terrence Stovall MD   ondansetron (ZOFRAN) 4 MG tablet Take 1 tablet (4 mg total) by mouth every 8 (eight) hours as needed. 8/23/15   Ketty Abbott,    ondansetron (ZOFRAN) 4 MG tablet Take 8 mg by mouth 2 (two) times daily.    Historical Provider,  "MD   ondansetron (ZOFRAN-ODT) 4 MG TbDL Take 1 tablet (4 mg total) by mouth every 8 (eight) hours as needed (nausea). 1/15/19   Alonso Nathan Jr., MD   pantoprazole (PROTONIX) 20 MG tablet Take 20 mg by mouth once daily.    Historical Provider, MD   potassium chloride SA (K-DUR,KLOR-CON) 20 MEQ tablet Take 1 tablet (20 mEq total) by mouth once daily. 8/23/15   Maritza Orourke MD   ranitidine (ZANTAC) 150 MG tablet TK 1 T PO HS 2/3/17   Historical Provider, MD   SPIRIVA WITH HANDIHALER 18 mcg inhalation capsule INHALE 1 C PO QD 16   Historical Provider, MD   sucralfate (CARAFATE) 100 mg/mL suspension Take 10 mLs (1 g total) by mouth 4 (four) times daily with meals and nightly. 3/20/14   Pollo Christian MD   VENTOLIN HFA 90 mcg/actuation inhaler INL 2 PFS PO Q 4 H PRN 17   Historical Provider, MD   dicyclomine (BENTYL) 20 mg tablet Take 1 tablet (20 mg total) by mouth every 6 (six) hours. 1/27/16 1/15/19  Maritza Orourke MD       Family History:  No family history on file.    Social History:  Social History     Tobacco Use    Smoking status: Current Every Day Smoker     Packs/day: 1.00     Years: 43.00     Pack years: 43.00     Types: Cigarettes   Substance Use Topics    Alcohol use: No    Drug use: Yes     Types: Marijuana     Comment: occasional       Review of Systems:  Pertinent items are noted in HPI. All other systems are reviewed and are negative.       Objective:   Last 24 Hour Vital Signs:  BP  Min: 113/79  Max: 205/100  Temp  Av.8 °F (36.6 °C)  Min: 97.7 °F (36.5 °C)  Max: 97.9 °F (36.6 °C)  Pulse  Av  Min: 82  Max: 125  Resp  Av.8  Min: 12  Max: 26  SpO2  Av.5 %  Min: 92 %  Max: 98 %  Height  Av' 4" (162.6 cm)  Min: 5' 4" (162.6 cm)  Max: 5' 4" (162.6 cm)  Weight  Av.9 kg (121 lb)  Min: 54.9 kg (121 lb)  Max: 54.9 kg (121 lb)  Body mass index is 20.77 kg/m².  No intake/output data recorded.    Physical Examination:    General appearance: NAD, A&OX3, " appears older than stated age  HEENT: conjunctivae/corneas clear. PERRL, EOM's intact.   Neck: supple, no adenopathy, no carotid bruit, no JVD, trachea midline  Lungs: clear to auscultation bilaterally.No wheezes or crackles.   Heart: RRR, S1, S2 normal, no murmur, click, rub or gallop  Abdomen: soft, non-tender; bowel sounds normal; no masses, no organomegaly  Neuro: CN II-XII grossly normal. No focal motor or sensory deficit.   Extremities: Appears normal. 2+ pulse. FROM. No edema.  Skin: Normal turgor and color. No rashes or lesions      Laboratory:  Most Recent Data:  CBC:   Lab Results   Component Value Date    WBC 21.26 (H) 01/15/2019    HGB 17.7 (H) 01/15/2019    HCT 53.1 (H) 01/15/2019     01/15/2019    MCV 99 (H) 01/15/2019    RDW 13.1 01/15/2019     BMP:   Lab Results   Component Value Date     01/15/2019    K 3.5 01/15/2019     01/15/2019    CO2 23 01/15/2019    BUN 10 01/15/2019    CREATININE 0.9 01/15/2019     01/15/2019    CALCIUM 10.5 01/15/2019    MG 1.6 12/21/2016    PHOS 3.5 01/24/2014     LFTs:   Lab Results   Component Value Date    PROT 8.3 01/15/2019    ALBUMIN 4.6 01/15/2019    BILITOT 0.6 01/15/2019    AST 34 01/15/2019    ALKPHOS 95 01/15/2019    ALT 29 01/15/2019     Coags:   Lab Results   Component Value Date    INR 1.1 07/21/2016     FLP:   Lab Results   Component Value Date    CHOL 126 12/17/2016    HDL 22 (L) 12/17/2016    LDLCALC 73.4 12/17/2016    TRIG 153 (H) 12/17/2016    CHOLHDL 17.5 (L) 12/17/2016     DM:   Lab Results   Component Value Date    HGBA1C 6.1 12/17/2016    HGBA1C 5.4 07/27/2013    LDLCALC 73.4 12/17/2016    CREATININE 0.9 01/15/2019     Thyroid:   Lab Results   Component Value Date    TSH 0.131 (L) 12/17/2016    FREET4 0.87 12/17/2016     Anemia:   Lab Results   Component Value Date    IRON 50 12/19/2016    TIBC 318 12/19/2016    FERRITIN 136 12/19/2016    SWWDXWZJ00 860 12/19/2016    FOLATE 7.2 12/19/2016     Cardiac:   Lab Results    Component Value Date    TROPONINI <0.006 01/15/2019     (H) 01/24/2014     Urinalysis:   Lab Results   Component Value Date    LABURIN No growth 01/23/2014    COLORU Yellow 01/15/2019    SPECGRAV <=1.005 (A) 01/15/2019    NITRITE Negative 01/15/2019    KETONESU Negative 01/15/2019    UROBILINOGEN Negative 01/15/2019    WBCUA 4 01/27/2016       Trended Lab Data:  Recent Labs   Lab 01/15/19  1006   WBC 21.26*   HGB 17.7*   HCT 53.1*      MCV 99*   RDW 13.1      K 3.5      CO2 23   BUN 10   CREATININE 0.9      PROT 8.3   ALBUMIN 4.6   BILITOT 0.6   AST 34   ALKPHOS 95   ALT 29       Trended Cardiac Data:  Recent Labs   Lab 01/15/19  1006   TROPONINI <0.006       Microbiology Data:  Reviewed     Other Results:  EKG (my interpretation): Reviewed   Radiology:  Imaging Results          CT Abdomen Pelvis  Without Contrast (Final result)  Result time 01/15/19 18:22:15   Procedure changed from CT Abdomen Pelvis With Contrast     Final result by Radha Jasso MD (01/15/19 18:22:15)                 Impression:      Distended gallbladder.  Otherwise no acute intra-abdominal abnormalities identified.  No significant change from earlier CT abdomen and pelvis from the same date.      Electronically signed by: Radha Jasso MD  Date:    01/15/2019  Time:    18:22             Narrative:    EXAMINATION:  CT ABDOMEN PELVIS WITHOUT CONTRAST    CLINICAL HISTORY:  abdominal pain;    TECHNIQUE:  CT of the abdomen and pelvis was obtained following administration of oral contrast.    COMPARISON:  CT abdomen and pelvis from the same date at 11:48.    FINDINGS:  Emphysematous changes seen within the visualized lower lobes.  No consolidation or pleural effusion seen.  Gallbladder is distended.  Liver, stomach, spleen, pancreas, and adrenal glands show no gross abnormalities.  Residual contrast excretion is noted in the kidneys.  Urinary bladder and uterus are unremarkable.  No evidence of bowel  obstruction.  Appendicoliths are again noted in the appendix which is otherwise unremarkable.  No free air or significant free fluid seen.                               X-Ray Chest AP Portable (Final result)  Result time 01/15/19 13:10:54    Final result by Jakub Whaley MD (01/15/19 13:10:54)                 Impression:      1. Radiographic findings of COPD with irregular subsegmental opacity projecting over the right upper lung zone, not significantly changed when compared to recent prior chest CT allowing for differences in technique.  No new consolidation.      Electronically signed by: Jakub Whaley MD  Date:    01/15/2019  Time:    13:10             Narrative:    EXAMINATION:  XR CHEST AP PORTABLE    CLINICAL HISTORY:  cough; Wheezing    TECHNIQUE:  Single frontal view of the chest was performed.    COMPARISON:  CT 12/11/2018.    FINDINGS:  Cardiac monitoring leads project over the bilateral hemithoraces.  Mediastinal structures are midline.  Hilar contours are unremarkable.  Cardiac silhouette is normal in size.  Lungs are overexpanded but symmetric.  Subsegmental irregular opacity projects over the right upper lung zone, better evaluated on previous CT.  No new focal consolidation.  No pneumothorax or pleural effusions.  No free air beneath the diaphragm.  No acute osseous abnormalities.  Visualized subcutaneous soft tissues are unremarkable.                               CT Abdomen Pelvis With Contrast (Final result)  Result time 01/15/19 12:05:17    Final result by Doris Brennan MD (01/15/19 12:05:17)                 Impression:      The appendix is seen and appears normal.  There is a small appendicoliths.  Just inferior to the appendix, in the right pelvic region, there is a small area of fluid and air bubbles possibly in the confines of bowel.  It is difficult to delineate the bowel wall due to the lack of oral contrast administration, if symptoms persist consider repeat exam with oral  contrast.      Electronically signed by: Doris Brennan MD  Date:    01/15/2019  Time:    12:05             Narrative:    EXAMINATION:  CT ABDOMEN PELVIS WITH CONTRAST    CLINICAL HISTORY:  rlq abdominal pain;    TECHNIQUE:  Low dose axial images, sagittal and coronal reformations were obtained from the lung bases to the pubic symphysis following the IV administration of 75 mL of Omnipaque 350 .  Oral contrast was not given.    COMPARISON:  07/29/2014    FINDINGS:  Emphysema.  No pleural effusion.  No liver lesions.  The gallbladder is present.  The stomach, pancreas, spleen, adrenal glands and bilateral kidneys appear normal.  There is moderate atherosclerotic changes of the aorta and iliac vessels, it tapers normally.  Mild stool retention, no definite inflammatory changes of the bowel seen, no bowel dilation. I suspect a small appendicolith .  The appendix is seen of normal caliber, there is a high density material within, no definite adjacent inflammatory change.  There is a trace of fluid and air bubbles just inferior to the appendix, it is favored to be in the confines of nondistended bowel rather than free fluid / free air.  The bowel wall is not very distinct.  No oral contrast given to better opacified/delineate the loops of is bowel.  The bladder appears normal.  The uterus appears small.  Postoperative changes of posterior instrumented fusion L5-S1 with listhesis noted of L5-S1.  The ovaries are not well seen, they may be small.                                   Assessment:     Amaya Hall is a 53 y.o. female with crampy abdominal pain that has been going for 1 week.     Plan:     Patient with chronic abdominal pain has been evaluated in the past by GI and had 2 EGD last one was last month which was normal. CT unremarkable. Patient evaluated by surgery and no intervention needed. Labs are unremarkable except for reactive leokocytosis. UA unremarkable. Vitals wnl. Patient afebrile. She is  feeling much now on my interview and she is tolerating PO. Her symptoms are more consistent with IBD/functional dyspepsia. Recommend giving her bentyl and zofran and have the patient see her GI Dr. Who is next by. Patient agreed and stated that she wants to go home as her symptoms are much better now. Case discussed with ED physician.          Jessica Clinton MD  [facility] Internal Medicine HO-II  U IM Service    [facility] Medicine Hospitalist Pager numbers:   [facility] Hospitalist Medicine Team A (Diamond/Francine): [phone]  [facility] Hospitalist Medicine Team B (Chastity/Jeff):  [phone]

## 2019-01-20 LAB — BACTERIA BLD CULT: NORMAL

## 2019-01-21 LAB
BACTERIA BLD CULT: NORMAL

## 2019-01-22 NOTE — PROVIDER PROGRESS NOTES - EMERGENCY DEPT.
Encounter Date: 1/15/2019    ED Physician Progress Notes         1/18/19 5:32 PM- Called to check on patient. Patient not home, left message with fiance to return call. [facility]    01/19/2019 4:34 PM attempted to call patient for re-evaluation and had no answer. ASHLEIGH      01/21/2019 8:15 PM certified letter sent. ASHLEIGH

## 2019-01-24 NOTE — PROVIDER PROGRESS NOTES - EMERGENCY DEPT.
Encounter Date: 1/15/2019    ED Physician Progress Notes             Patient called back after certified letter sent. Still having abdominal pain.  Denies fever, N/V.  Discussed positive blood culture results with Dr. Barnes who thought it best to have patient return for re-assessment.

## 2019-01-25 ENCOUNTER — HOSPITAL ENCOUNTER (OUTPATIENT)
Facility: HOSPITAL | Age: 54
Discharge: HOME | End: 2019-01-28
Attending: EMERGENCY MEDICINE | Admitting: INTERNAL MEDICINE
Payer: MEDICAID

## 2019-01-25 DIAGNOSIS — R10.9 ABDOMINAL PAIN, UNSPECIFIED ABDOMINAL LOCATION: ICD-10-CM

## 2019-01-25 DIAGNOSIS — E83.52 HYPERCALCEMIA: ICD-10-CM

## 2019-01-25 DIAGNOSIS — R10.31 RIGHT LOWER QUADRANT ABDOMINAL PAIN: ICD-10-CM

## 2019-01-25 DIAGNOSIS — R10.13 EPIGASTRIC PAIN: Primary | ICD-10-CM

## 2019-01-25 DIAGNOSIS — R45.1 AGITATION: ICD-10-CM

## 2019-01-25 DIAGNOSIS — R10.84 GENERALIZED ABDOMINAL PAIN: ICD-10-CM

## 2019-01-25 DIAGNOSIS — R78.81 GRAM-NEGATIVE BACTEREMIA: ICD-10-CM

## 2019-01-25 LAB
ALBUMIN SERPL BCP-MCNC: 4.7 G/DL
ALP SERPL-CCNC: 97 U/L
ALT SERPL W/O P-5'-P-CCNC: 42 U/L
ANION GAP SERPL CALC-SCNC: 14 MMOL/L
AST SERPL-CCNC: 39 U/L
BASOPHILS # BLD AUTO: 0.02 K/UL
BASOPHILS NFR BLD: 0.2 %
BILIRUB SERPL-MCNC: 0.4 MG/DL
BILIRUB UR QL STRIP: NEGATIVE
BUN SERPL-MCNC: 18 MG/DL
CALCIUM SERPL-MCNC: 10.6 MG/DL
CHLORIDE SERPL-SCNC: 96 MMOL/L
CLARITY UR: CLEAR
CO2 SERPL-SCNC: 25 MMOL/L
COLOR UR: YELLOW
CREAT SERPL-MCNC: 1.1 MG/DL
DIFFERENTIAL METHOD: ABNORMAL
EOSINOPHIL # BLD AUTO: 0.1 K/UL
EOSINOPHIL NFR BLD: 0.7 %
ERYTHROCYTE [DISTWIDTH] IN BLOOD BY AUTOMATED COUNT: 14.3 %
EST. GFR  (AFRICAN AMERICAN): >60 ML/MIN/1.73 M^2
EST. GFR  (NON AFRICAN AMERICAN): 57 ML/MIN/1.73 M^2
GLUCOSE SERPL-MCNC: 102 MG/DL
GLUCOSE UR QL STRIP: NEGATIVE
HCT VFR BLD AUTO: 54 %
HGB BLD-MCNC: 18 G/DL
HGB UR QL STRIP: ABNORMAL
KETONES UR QL STRIP: NEGATIVE
LEUKOCYTE ESTERASE UR QL STRIP: NEGATIVE
LYMPHOCYTES # BLD AUTO: 4 K/UL
LYMPHOCYTES NFR BLD: 34.2 %
MCH RBC QN AUTO: 32.7 PG
MCHC RBC AUTO-ENTMCNC: 33.3 G/DL
MCV RBC AUTO: 98 FL
MONOCYTES # BLD AUTO: 1 K/UL
MONOCYTES NFR BLD: 8.9 %
NEUTROPHILS # BLD AUTO: 6.5 K/UL
NEUTROPHILS NFR BLD: 56 %
NITRITE UR QL STRIP: NEGATIVE
OB PNL STL: NEGATIVE
PH UR STRIP: 5 [PH] (ref 5–8)
PLATELET # BLD AUTO: 317 K/UL
PMV BLD AUTO: 9.1 FL
POTASSIUM SERPL-SCNC: 4.1 MMOL/L
PROT SERPL-MCNC: 8.4 G/DL
PROT UR QL STRIP: NEGATIVE
RBC # BLD AUTO: 5.51 M/UL
SODIUM SERPL-SCNC: 135 MMOL/L
SP GR UR STRIP: 1.02 (ref 1–1.03)
URN SPEC COLLECT METH UR: ABNORMAL
UROBILINOGEN UR STRIP-ACNC: NEGATIVE EU/DL
WBC # BLD AUTO: 11.68 K/UL

## 2019-01-25 PROCEDURE — 25000003 PHARM REV CODE 250: Performed by: STUDENT IN AN ORGANIZED HEALTH CARE EDUCATION/TRAINING PROGRAM

## 2019-01-25 PROCEDURE — 81003 URINALYSIS AUTO W/O SCOPE: CPT

## 2019-01-25 PROCEDURE — 80053 COMPREHEN METABOLIC PANEL: CPT

## 2019-01-25 PROCEDURE — 25000003 PHARM REV CODE 250: Performed by: NURSE PRACTITIONER

## 2019-01-25 PROCEDURE — 96366 THER/PROPH/DIAG IV INF ADDON: CPT

## 2019-01-25 PROCEDURE — 25000003 PHARM REV CODE 250: Performed by: EMERGENCY MEDICINE

## 2019-01-25 PROCEDURE — S0030 INJECTION, METRONIDAZOLE: HCPCS | Performed by: STUDENT IN AN ORGANIZED HEALTH CARE EDUCATION/TRAINING PROGRAM

## 2019-01-25 PROCEDURE — 85025 COMPLETE CBC W/AUTO DIFF WBC: CPT

## 2019-01-25 PROCEDURE — 96375 TX/PRO/DX INJ NEW DRUG ADDON: CPT

## 2019-01-25 PROCEDURE — S0030 INJECTION, METRONIDAZOLE: HCPCS | Performed by: EMERGENCY MEDICINE

## 2019-01-25 PROCEDURE — 63600175 PHARM REV CODE 636 W HCPCS: Performed by: NURSE PRACTITIONER

## 2019-01-25 PROCEDURE — 25500020 PHARM REV CODE 255: Performed by: EMERGENCY MEDICINE

## 2019-01-25 PROCEDURE — G0378 HOSPITAL OBSERVATION PER HR: HCPCS

## 2019-01-25 PROCEDURE — 96365 THER/PROPH/DIAG IV INF INIT: CPT

## 2019-01-25 PROCEDURE — 87040 BLOOD CULTURE FOR BACTERIA: CPT | Mod: 59

## 2019-01-25 PROCEDURE — 63600175 PHARM REV CODE 636 W HCPCS: Performed by: STUDENT IN AN ORGANIZED HEALTH CARE EDUCATION/TRAINING PROGRAM

## 2019-01-25 PROCEDURE — 87040 BLOOD CULTURE FOR BACTERIA: CPT

## 2019-01-25 PROCEDURE — S4991 NICOTINE PATCH NONLEGEND: HCPCS | Performed by: STUDENT IN AN ORGANIZED HEALTH CARE EDUCATION/TRAINING PROGRAM

## 2019-01-25 PROCEDURE — 82272 OCCULT BLD FECES 1-3 TESTS: CPT

## 2019-01-25 PROCEDURE — 99285 EMERGENCY DEPT VISIT HI MDM: CPT | Mod: 25

## 2019-01-25 RX ORDER — ONDANSETRON 2 MG/ML
4 INJECTION INTRAMUSCULAR; INTRAVENOUS EVERY 8 HOURS PRN
Status: DISCONTINUED | OUTPATIENT
Start: 2019-01-25 | End: 2019-01-27

## 2019-01-25 RX ORDER — DICYCLOMINE HYDROCHLORIDE 10 MG/1
10 CAPSULE ORAL 4 TIMES DAILY
Status: DISCONTINUED | OUTPATIENT
Start: 2019-01-25 | End: 2019-01-26

## 2019-01-25 RX ORDER — LISINOPRIL 20 MG/1
20 TABLET ORAL DAILY
Status: DISCONTINUED | OUTPATIENT
Start: 2019-01-25 | End: 2019-01-28 | Stop reason: HOSPADM

## 2019-01-25 RX ORDER — HYOSCYAMINE SULFATE 0.5 MG/ML
0.5 INJECTION, SOLUTION SUBCUTANEOUS
Status: COMPLETED | OUTPATIENT
Start: 2019-01-25 | End: 2019-01-25

## 2019-01-25 RX ORDER — HEPARIN SODIUM 5000 [USP'U]/ML
5000 INJECTION, SOLUTION INTRAVENOUS; SUBCUTANEOUS EVERY 8 HOURS
Status: DISCONTINUED | OUTPATIENT
Start: 2019-01-25 | End: 2019-01-28 | Stop reason: HOSPADM

## 2019-01-25 RX ORDER — SODIUM CHLORIDE 0.9 % (FLUSH) 0.9 %
5 SYRINGE (ML) INJECTION
Status: DISCONTINUED | OUTPATIENT
Start: 2019-01-25 | End: 2019-01-28 | Stop reason: HOSPADM

## 2019-01-25 RX ORDER — FLUTICASONE FUROATE AND VILANTEROL 100; 25 UG/1; UG/1
1 POWDER RESPIRATORY (INHALATION) DAILY
Status: DISCONTINUED | OUTPATIENT
Start: 2019-01-26 | End: 2019-01-28 | Stop reason: HOSPADM

## 2019-01-25 RX ORDER — TIOTROPIUM BROMIDE 18 UG/1
1 CAPSULE ORAL; RESPIRATORY (INHALATION) DAILY
Status: DISCONTINUED | OUTPATIENT
Start: 2019-01-26 | End: 2019-01-28 | Stop reason: HOSPADM

## 2019-01-25 RX ORDER — HYOSCYAMINE SULFATE 0.12 MG/1
0.12 TABLET SUBLINGUAL EVERY 8 HOURS PRN
Status: DISCONTINUED | OUTPATIENT
Start: 2019-01-25 | End: 2019-01-26

## 2019-01-25 RX ORDER — METRONIDAZOLE 500 MG/100ML
500 INJECTION, SOLUTION INTRAVENOUS
Status: COMPLETED | OUTPATIENT
Start: 2019-01-25 | End: 2019-01-25

## 2019-01-25 RX ORDER — OXYCODONE AND ACETAMINOPHEN 5; 325 MG/1; MG/1
1 TABLET ORAL ONCE
Status: COMPLETED | OUTPATIENT
Start: 2019-01-25 | End: 2019-01-25

## 2019-01-25 RX ORDER — METRONIDAZOLE 500 MG/100ML
500 INJECTION, SOLUTION INTRAVENOUS
Status: DISCONTINUED | OUTPATIENT
Start: 2019-01-25 | End: 2019-01-28 | Stop reason: HOSPADM

## 2019-01-25 RX ORDER — IBUPROFEN 200 MG
1 TABLET ORAL DAILY
Status: DISCONTINUED | OUTPATIENT
Start: 2019-01-25 | End: 2019-01-28 | Stop reason: HOSPADM

## 2019-01-25 RX ORDER — ALBUTEROL SULFATE 90 UG/1
1 AEROSOL, METERED RESPIRATORY (INHALATION) EVERY 4 HOURS PRN
Status: DISCONTINUED | OUTPATIENT
Start: 2019-01-25 | End: 2019-01-28 | Stop reason: HOSPADM

## 2019-01-25 RX ORDER — GABAPENTIN 600 MG/1
600 TABLET ORAL 3 TIMES DAILY
Status: DISCONTINUED | OUTPATIENT
Start: 2019-01-25 | End: 2019-01-28 | Stop reason: HOSPADM

## 2019-01-25 RX ORDER — DULOXETIN HYDROCHLORIDE 30 MG/1
30 CAPSULE, DELAYED RELEASE ORAL DAILY
Status: DISCONTINUED | OUTPATIENT
Start: 2019-01-26 | End: 2019-01-26

## 2019-01-25 RX ADMIN — IOHEXOL 75 ML: 350 INJECTION, SOLUTION INTRAVENOUS at 03:01

## 2019-01-25 RX ADMIN — METRONIDAZOLE 500 MG: 500 INJECTION, SOLUTION INTRAVENOUS at 06:01

## 2019-01-25 RX ADMIN — OXYCODONE HYDROCHLORIDE AND ACETAMINOPHEN 1 TABLET: 5; 325 TABLET ORAL at 03:01

## 2019-01-25 RX ADMIN — NICOTINE 1 PATCH: 21 PATCH, EXTENDED RELEASE TRANSDERMAL at 01:01

## 2019-01-25 RX ADMIN — SODIUM CHLORIDE 1000 ML: 0.9 INJECTION, SOLUTION INTRAVENOUS at 09:01

## 2019-01-25 RX ADMIN — HYOSCYAMINE SULFATE 0.5 MG: 0.5 INJECTION, SOLUTION SUBCUTANEOUS at 11:01

## 2019-01-25 RX ADMIN — DICYCLOMINE HYDROCHLORIDE 10 MG: 10 CAPSULE ORAL at 06:01

## 2019-01-25 RX ADMIN — ONDANSETRON 4 MG: 2 INJECTION INTRAMUSCULAR; INTRAVENOUS at 09:01

## 2019-01-25 RX ADMIN — OXYCODONE HYDROCHLORIDE AND ACETAMINOPHEN 1 TABLET: 5; 325 TABLET ORAL at 08:01

## 2019-01-25 RX ADMIN — DICYCLOMINE HYDROCHLORIDE 10 MG: 10 CAPSULE ORAL at 03:01

## 2019-01-25 RX ADMIN — METRONIDAZOLE 500 MG: 500 INJECTION, SOLUTION INTRAVENOUS at 09:01

## 2019-01-25 RX ADMIN — HEPARIN SODIUM 5000 UNITS: 5000 INJECTION, SOLUTION INTRAVENOUS; SUBCUTANEOUS at 09:01

## 2019-01-25 RX ADMIN — LISINOPRIL 20 MG: 20 TABLET ORAL at 02:01

## 2019-01-25 RX ADMIN — GABAPENTIN 600 MG: 600 TABLET, FILM COATED ORAL at 08:01

## 2019-01-25 RX ADMIN — DICYCLOMINE HYDROCHLORIDE 10 MG: 10 CAPSULE ORAL at 08:01

## 2019-01-25 NOTE — ED NOTES
Pt resting on stretcher watching tv. No complaints at this time. On cardiac monitor. SR up and CB in reach. No distress noted. Will continue to monitor.

## 2019-01-25 NOTE — ED NOTES
APPEARANCE: Alert, oriented and in no acute distress, frail  CARDIAC: Normal rate and rhythm, no murmur heard.   PERIPHERAL VASCULAR: peripheral pulses present. Normal cap refill. No edema. Warm to touch.    RESPIRATORY:Normal rate and effort, breath sounds clear bilaterally throughout chest. Respirations are equal and unlabored no obvious signs of distress.  GASTRO: soft, bowel sounds normal, no tenderness, no abdominal distention.  MUSC: Limited ROM due to weakness. No bony tenderness or soft tissue tenderness. No obvious deformity.  SKIN: Skin is warm and dry, normal skin turgor, mucous membranes moist.  NEURO: 5/5 strength major flexors/extensors bilaterally. Sensory intact to light touch bilaterally. Ally coma scale: eyes open spontaneously-4, oriented & converses-5, obeys commands-6. No neurological abnormalities.   MENTAL STATUS: awake, alert and aware of environment.  EYE: PERRL, both eyes: pupils brisk and reactive to light. Normal size.  ENT: EARS: no obvious drainage. NOSE: no active bleeding.   Pt complains of abdominal pain with diarrhea- pt was seen here on 1/15 for same issue.

## 2019-01-25 NOTE — ED PROVIDER NOTES
Encounter Date: 1/15/2019       History     Chief Complaint   Patient presents with    Abdominal Pain     abd pain for 3days. denies n/v      54 y/o female with PMH of       The history is provided by the patient.     Review of patient's allergies indicates:   Allergen Reactions    Ultram [tramadol] Swelling     Past Medical History:   Diagnosis Date    Anxiety     COPD (chronic obstructive pulmonary disease)     Gastric ulcer, chronic     GERD (gastroesophageal reflux disease)     Hypercholesteremia     Hypertension      Past Surgical History:   Procedure Laterality Date    BACK SURGERY      ESOPHAGOGASTRODUODENOSCOPY (EGD) N/A 3/30/2017    Performed by Constantine Avila MD at [facility]     No family history on file.  Social History     Tobacco Use    Smoking status: Current Every Day Smoker     Packs/day: 1.00     Years: 43.00     Pack years: 43.00     Types: Cigarettes   Substance Use Topics    Alcohol use: No    Drug use: Yes     Types: Marijuana     Comment: occasional     Review of Systems    Physical Exam     Initial Vitals [01/15/19 1007]   BP Pulse Resp Temp SpO2   (!) 205/100 83 20 97.7 °F (36.5 °C) 97 %      MAP       --         Physical Exam    ED Course   Procedures  Labs Reviewed   CBC W/ AUTO DIFFERENTIAL - Abnormal; Notable for the following components:       Result Value    WBC 21.26 (*)     Hemoglobin 17.7 (*)     Hematocrit 53.1 (*)     MCV 99 (*)     MCH 33.0 (*)     Gran # (ANC) 16.1 (*)     Mono # 1.2 (*)     Gran% 75.8 (*)     Lymph% 17.5 (*)     All other components within normal limits   URINALYSIS, REFLEX TO URINE CULTURE - Abnormal; Notable for the following components:    Specific Gravity, UA <=1.005 (*)     All other components within normal limits    Narrative:     Preferred Collection Type->Urine, Clean Catch   CULTURE, BLOOD   CULTURE, BLOOD   COMPREHENSIVE METABOLIC PANEL   LIPASE   DRUG SCREEN PANEL, URINE EMERGENCY    Narrative:     Preferred Collection Type->Urine,  Clean Catch   C-REACTIVE PROTEIN   LACTIC ACID, PLASMA   LACTIC ACID, PLASMA   C-REACTIVE PROTEIN   TROPONIN I   TROPONIN I   OCCULT BLOOD X 1, STOOL        ECG Results          EKG 12-lead (Final result)  Result time 01/17/19 18:35:44    Final result by Interface, Lab In [facility] [place] (01/17/19 18:35:44)                 Narrative:    Test Reason : R00.0,  Blood Pressure : ***/*** mmHG  Vent. Rate : 124 BPM     Atrial Rate : 124 BPM     P-R Int : 128 ms          QRS Dur : 128 ms      QT Int : 350 ms       P-R-T Axes : 081 136 053 degrees     QTc Int : 502 ms    Sinus tachycardia with occasional Premature ventricular complexes  Right atrial enlargement  Right bundle branch block  Left posterior fascicular block  *** Bifascicular block ***  Abnormal ECG  When compared with ECG of 21-DEC-2016 03:34,  Premature ventricular complexes are now Present  Confirmed by Valdo ZENG, Allyssa ([id number]) on 1/17/2019 6:35:34 PM    Referred By: [email or web address]   SELF           Confirmed By:Allyssa Lyle MD                            Imaging Results          CT Abdomen Pelvis  Without Contrast (Final result)  Result time 01/15/19 18:22:15   Procedure changed from CT Abdomen Pelvis With Contrast     Final result by Radha Jasso MD (01/15/19 18:22:15)                 Impression:      Distended gallbladder.  Otherwise no acute intra-abdominal abnormalities identified.  No significant change from earlier CT abdomen and pelvis from the same date.      Electronically signed by: Radha Jasso MD  Date:    01/15/2019  Time:    18:22             Narrative:    EXAMINATION:  CT ABDOMEN PELVIS WITHOUT CONTRAST    CLINICAL HISTORY:  abdominal pain;    TECHNIQUE:  CT of the abdomen and pelvis was obtained following administration of oral contrast.    COMPARISON:  CT abdomen and pelvis from the same date at 11:48.    FINDINGS:  Emphysematous changes seen within the visualized lower lobes.  No consolidation or pleural effusion seen.  Gallbladder is distended.   Liver, stomach, spleen, pancreas, and adrenal glands show no gross abnormalities.  Residual contrast excretion is noted in the kidneys.  Urinary bladder and uterus are unremarkable.  No evidence of bowel obstruction.  Appendicoliths are again noted in the appendix which is otherwise unremarkable.  No free air or significant free fluid seen.                               X-Ray Chest AP Portable (Final result)  Result time 01/15/19 13:10:54    Final result by Jakub Whaley MD (01/15/19 13:10:54)                 Impression:      1. Radiographic findings of COPD with irregular subsegmental opacity projecting over the right upper lung zone, not significantly changed when compared to recent prior chest CT allowing for differences in technique.  No new consolidation.      Electronically signed by: Jakub Whaley MD  Date:    01/15/2019  Time:    13:10             Narrative:    EXAMINATION:  XR CHEST AP PORTABLE    CLINICAL HISTORY:  cough; Wheezing    TECHNIQUE:  Single frontal view of the chest was performed.    COMPARISON:  CT 12/11/2018.    FINDINGS:  Cardiac monitoring leads project over the bilateral hemithoraces.  Mediastinal structures are midline.  Hilar contours are unremarkable.  Cardiac silhouette is normal in size.  Lungs are overexpanded but symmetric.  Subsegmental irregular opacity projects over the right upper lung zone, better evaluated on previous CT.  No new focal consolidation.  No pneumothorax or pleural effusions.  No free air beneath the diaphragm.  No acute osseous abnormalities.  Visualized subcutaneous soft tissues are unremarkable.                               CT Abdomen Pelvis With Contrast (Final result)  Result time 01/15/19 12:05:17    Final result by Doris Brennan MD (01/15/19 12:05:17)                 Impression:      The appendix is seen and appears normal.  There is a small appendicoliths.  Just inferior to the appendix, in the right pelvic region, there is a small area of  fluid and air bubbles possibly in the confines of bowel.  It is difficult to delineate the bowel wall due to the lack of oral contrast administration, if symptoms persist consider repeat exam with oral contrast.      Electronically signed by: Doris Brennan MD  Date:    01/15/2019  Time:    12:05             Narrative:    EXAMINATION:  CT ABDOMEN PELVIS WITH CONTRAST    CLINICAL HISTORY:  rlq abdominal pain;    TECHNIQUE:  Low dose axial images, sagittal and coronal reformations were obtained from the lung bases to the pubic symphysis following the IV administration of 75 mL of Omnipaque 350 .  Oral contrast was not given.    COMPARISON:  07/29/2014    FINDINGS:  Emphysema.  No pleural effusion.  No liver lesions.  The gallbladder is present.  The stomach, pancreas, spleen, adrenal glands and bilateral kidneys appear normal.  There is moderate atherosclerotic changes of the aorta and iliac vessels, it tapers normally.  Mild stool retention, no definite inflammatory changes of the bowel seen, no bowel dilation. I suspect a small appendicolith .  The appendix is seen of normal caliber, there is a high density material within, no definite adjacent inflammatory change.  There is a trace of fluid and air bubbles just inferior to the appendix, it is favored to be in the confines of nondistended bowel rather than free fluid / free air.  The bowel wall is not very distinct.  No oral contrast given to better opacified/delineate the loops of is bowel.  The bladder appears normal.  The uterus appears small.  Postoperative changes of posterior instrumented fusion L5-S1 with listhesis noted of L5-S1.  The ovaries are not well seen, they may be small.                                                   ED Course as of Jan 25 1744   Tue Alexandre 15, 2019   1408 Opiate Scrn, Ur: Presumptive Positive [RN]   1957 Re-evaluated the patient with [facility] internal medicine team.  Patient would like to go home discussed admission for  observation given leukocytosis however patient would like to leave.  Patient instructed to follow up with GI and will discharge on [place].  [RN]   2000 On re-evaluation patient's vital signs are stable heart rate is in the 90s her pain is controlled.  [RN]      ED Course User Index  [RN] Alonso Nathan Jr., MD     Clinical Impression:   {Add your Clinical Impression here. If you haven't documented one yet, please pend the note, finalize a Clinical Impression, and refresh your note before signing.:[id number]}

## 2019-01-25 NOTE — ED NOTES
Pt complaining of right lower quadrant pain. Dr. Aguilar notified and will put in an order for pain control.

## 2019-01-25 NOTE — ED PROVIDER NOTES
Encounter Date: 1/25/2019       History     Chief Complaint   Patient presents with    Abdominal Pain     Seen in ED for same on 1/15, reports tlked to [facility] yesterday and told to come in for more antibiotics.  +diarrhea, denies N/V     52 y/o female with PMH of GERD and gastric ulcers presents for call to return to ER for positive blood cultures. Pt was seen and tx here 1/15.   Pt states after leaving ER last time she took 7 pills of Amoxicillin from a family member and percocet 5/325 for pain. Pt is still continuing to have RLQ pain and diarrhea that has improved since last admission. Pt denies N/V, F, or melena.      The history is provided by the patient.     Review of patient's allergies indicates:   Allergen Reactions    Ultram [tramadol] Swelling     Past Medical History:   Diagnosis Date    Anxiety     COPD (chronic obstructive pulmonary disease)     Gastric ulcer, chronic     GERD (gastroesophageal reflux disease)     Hypercholesteremia     Hypertension      Past Surgical History:   Procedure Laterality Date    BACK SURGERY      ESOPHAGOGASTRODUODENOSCOPY (EGD) N/A 3/30/2017    Performed by Constantine Avila MD at [facility]     History reviewed. No pertinent family history.  Social History     Tobacco Use    Smoking status: Current Every Day Smoker     Packs/day: 1.00     Years: 43.00     Pack years: 43.00     Types: Cigarettes   Substance Use Topics    Alcohol use: No    Drug use: Yes     Types: Marijuana     Comment: occasional     Review of Systems   Constitutional: Negative for chills and fever.   Respiratory: Negative for shortness of breath.    Cardiovascular: Negative for chest pain.   Gastrointestinal: Positive for abdominal pain (RLQ) and diarrhea. Negative for blood in stool, nausea and vomiting.   Genitourinary: Negative for decreased urine volume and difficulty urinating.   Musculoskeletal: Negative for arthralgias and myalgias.   Allergic/Immunologic: Negative for  immunocompromised state.   Neurological: Negative for dizziness, weakness and headaches.   Hematological: Does not bruise/bleed easily.   All other systems reviewed and are negative.      Physical Exam     Initial Vitals [01/25/19 0835]   BP Pulse Resp Temp SpO2   (!) 181/116 104 17 97.9 °F (36.6 °C) 99 %      MAP       --         Physical Exam    Nursing note and vitals reviewed.  Constitutional: Vital signs are normal. She appears well-developed and well-nourished. She is cooperative.  Non-toxic appearance. She does not appear ill. No distress.   HENT:   Head: Atraumatic.   Mouth/Throat: Oropharynx is clear and moist and mucous membranes are normal.   Eyes: Conjunctivae and EOM are normal.   Neck: Full passive range of motion without pain.   Cardiovascular: Normal rate, regular rhythm and normal heart sounds.   Pulses:       Dorsalis pedis pulses are 2+ on the right side, and 2+ on the left side.   Pulmonary/Chest: Effort normal and breath sounds normal.   Abdominal: Soft. Normal appearance and bowel sounds are normal. She exhibits no distension and no mass. There is tenderness in the right lower quadrant. There is no rebound and no guarding.   Musculoskeletal: Normal range of motion.   Neurological: She is alert and oriented to person, place, and time. She has normal strength. No cranial nerve deficit or sensory deficit.   Skin: Skin is warm and intact. Capillary refill takes less than 2 seconds.   Psychiatric: She has a normal mood and affect. Her speech is normal and behavior is normal.         ED Course   Procedures  Labs Reviewed   CBC W/ AUTO DIFFERENTIAL - Abnormal; Notable for the following components:       Result Value    RBC 5.51 (*)     Hemoglobin 18.0 (*)     Hematocrit 54.0 (*)     MCH 32.7 (*)     MPV 9.1 (*)     All other components within normal limits   COMPREHENSIVE METABOLIC PANEL - Abnormal; Notable for the following components:    Sodium 135 (*)     Calcium 10.6 (*)     eGFR if non African  American 57 (*)     All other components within normal limits   URINALYSIS, REFLEX TO URINE CULTURE - Abnormal; Notable for the following components:    Occult Blood UA Trace (*)     All other components within normal limits    Narrative:     Preferred Collection Type->Urine, Clean Catch   CULTURE, BLOOD   CULTURE, BLOOD   CULTURE, STOOL   CLOSTRIDIUM DIFFICILE   OCCULT BLOOD X 1, STOOL   STOOL EXAM-OVA,CYSTS,PARASITES   WBC, STOOL          Imaging Results          CT Abdomen Pelvis With Contrast (Final result)  Result time 01/25/19 15:27:15    Final result by Romario Ortiz MD (01/25/19 15:27:15)                 Impression:      1. Prominent venous vasculature within the adnexa, correlation with any history of pelvic congestion syndrome recommended.  2. Distention of the stomach with ingested liquid, without wall thickening.  3. Moderate stool in the colon.  4. Several additional findings above.      Electronically signed by: Romario Ortiz MD  Date:    01/25/2019  Time:    15:27             Narrative:    EXAMINATION:  CT ABDOMEN PELVIS WITH CONTRAST    CLINICAL HISTORY:  Abd pain, fever, abscess suspected; Generalized abdominal pain    TECHNIQUE:  Low dose axial images, sagittal and coronal reformations were obtained from the lung bases to the pubic symphysis following the IV administration of 75 mL of Omnipaque 350 .  Oral contrast was not given.    COMPARISON:  01/15/2019    FINDINGS:  Images of the lower thorax are remarkable for scattered emphysematous change.    The liver, spleen, pancreas, gallbladder and adrenal glands are grossly unremarkable.  There is no biliary dilation or ascites.  The portal vein, splenic vein, SMV, celiac axis and SMA all are patent.  The stomach is distended with ingested liquid.    The kidneys enhance symmetrically without hydronephrosis or nephrolithiasis.  Punctate hypoattenuating lesions are noted within the kidneys bilaterally, too small for characterization.  The  bilateral ureters are unable to be followed in their entirety to the urinary bladder, no definite calculi along their visualized courses, and no secondary findings to suggest obstructive uropathy.  The urinary bladder is decompressed, limiting evaluation.  The uterus and adnexa is remarkable for prominent venous vasculature in the adnexa bilaterally.  Correlation with any history of pelvic congestion syndrome.    There are several scattered colonic diverticula, no definite surrounding inflammation allowing for motion artifact.  The appendix and terminal ileum are grossly unremarkable.  The small bowel is grossly unremarkable.  No focal organized pelvic fluid collection.  There is atherosclerotic calcification of the aorta and its branches.    Degenerative changes are noted of the spine.  Postsurgical changes are noted of the spine.  Spinal fusion hardware spans L5-S1 noting grade 1-2 anterolisthesis of L5 in relationship to S1.  This appears stable as compared to the previous exam.  No significant inguinal lymphadenopathy.                                 Medical Decision Making:   Initial Assessment:   Pt presents to ED for call to return d/t blood cultures on 1/15 growing Gram neg rods.   Pt still continuing to have RLQ pain and diarrhea that has improved since last admission. Pt denies N/V, F, or melena.  TTP to RLQ  Differential Diagnosis:   Appendicitis, gastroenteritis, obstruction  Clinical Tests:   Lab Tests: Ordered and Reviewed  ED Management:  Pt given IV Flagyl and hycosamine in ED, blood cultures and stool studies pending.  Labs-no acute abnormalities, no increased WBC  Pt to be admitted to [facility], plan to re-scan pt's abd, monitor lab values and treat pain and diarrhea.  Other:   I have discussed this case with another health care provider.                   ED Course as of Jan 25 1206   Fri Jan 25, 2019   1115 Chloride: 96 [RN]      ED Course User Index  [RN] Alonso Nathan Jr., MD      Clinical Impression:   The primary encounter diagnosis was Epigastric pain. Diagnoses of Gram-negative bacteremia, Generalized abdominal pain, Abdominal pain, unspecified abdominal location, Agitation, Right lower quadrant abdominal pain, and Hypercalcemia were also pertinent to this visit.                             Arik Choe NP  01/26/19 [id number]

## 2019-01-25 NOTE — H&P
[facility]   History and Physical  Intern Note    Admitting Team: [facility] Internal Medicine Team B  Attending Physician: Javier  Resident: Ishmael  Interns: SRAVANTHI     Date of Admit: 1/25/2019    Chief Complaint     Abdominal pain for 2 months    Subjective:      History of Present Illness:  Amaya Hall is a 53 y.o.  female with a PMHx of COPD, HTN, HLD, bronchitis, seizures, panic disorder. The patient presented to [facility] on 1/25/2019 with a primary complaint of Abdominal pain x 2 months.    The patient was in their usual state of health until a few months prior to admission when she reports that she was having intermittent abdominal pain with n/v and diarrhea. She was being followed as an outpatient and underwent and EGD/colonoscopy for further evaluation which was reportedly unrevealing. Since that time, she reports that her intermittent/waxing and waning abdominal pain, as well as her nausea/vomiting and diarrhea have persisted. She decided to present to the [facility] emergency department on 1/15/2018 when she has an acute worsening of her RLQ abdominal pain. She underwent a CT Abdomen and pelvis which showed a distended gallbladder. It otherwise showed no acute intra-abdominal abnormalities. She was evaluated by surgery at time who felt that no acute surgical intervention was needed. She was discharged home from the emergency room but reports that since that time her abdominal pain has persisted and she still has intermittent nausea and vomiting. Her diarrhea has slightly improved and has been more formed over the past 48 hours. She reports subjective fevers but has not measure her temperature. She received a letter in the male with the results from her previous culture. She called her physician and was instructed to return to the emergency room. She denies having any other symptoms. She reports that she has had some weight loss but her weight has  fluctuated greatly throughout her life. Reports that she weight 129 two months ago and that her weight dropped to 105 a couple of weeks PTA but now weighs 120 lbs. She denies dysuria. Denies having any sick contacts. All other ROS negative.     Past Medical History:  Past Medical History:   Diagnosis Date    Anxiety     COPD (chronic obstructive pulmonary disease)     Gastric ulcer, chronic     GERD (gastroesophageal reflux disease)     Hypercholesteremia     Hypertension        Past Surgical History:  Past Surgical History:   Procedure Laterality Date    BACK SURGERY      ESOPHAGOGASTRODUODENOSCOPY (EGD) N/A 3/30/2017    Performed by Constantine Avila MD at [facility] ENDO        Allergies:  Review of patient's allergies indicates:   Allergen Reactions    Ultram [tramadol] Swelling       Home Medications:  Prior to Admission medications    Medication Sig Start Date End Date Taking? Authorizing Provider   alum-mag hydroxide-simeth 225-200-25 mg/5 mL Susp Take as directed on bottle 11/27/15   Sarthak Amos MD   amitriptyline (ELAVIL) 25 MG tablet Take one pill by mouth nightly for pain. 4/13/17   Constantine Avila MD   atorvastatin (LIPITOR) 80 MG tablet Take 80 mg by mouth once daily.    Historical Provider, MD   budesonide-formoterol 160-4.5 mcg (SYMBICORT) 160-4.5 mcg/actuation HFAA Inhale 2 puffs into the lungs every 12 (twelve) hours.    Historical Provider, MD   dicyclomine (BENTYL) 20 mg tablet Take 1 tablet (20 mg total) by mouth 4 (four) times daily as needed (gi distress). 1/15/19 2/14/19  Alonso Nathan Jr., MD   duloxetine (CYMBALTA) 30 MG capsule Take 30 mg by mouth once daily.    Historical Provider, MD   esomeprazole magnesium (NEXIUM 24HR) 20 mg TbEC Take 20 mg by mouth once daily. 3/1/17   Constantine Avila MD   gabapentin (NEURONTIN) 600 MG tablet Take 600 mg by mouth 3 (three) times daily.    Historical Provider, MD   gabapentin (NEURONTIN) 600 MG tablet Take 600 mg by mouth 3 (three)  "times daily.    Historical Provider, MD   hyoscyamine (LEVSIN/SL) 0.125 mg Subl Place 1 tablet (0.125 mg total) under the tongue every 8 (eight) hours as needed (abdominal pain). 3/1/17   Constantine Avila MD   lisinopril-hydrochlorothiazide (PRINZIDE,ZESTORETIC) 20-25 mg Tab Take 1 tablet by mouth once daily. 2/5/15 2/5/16  Pollo Christian MD   nicotine (NICODERM CQ) 21 mg/24 hr Place 1 patch onto the skin once daily. 16   Terrence Stovall MD   ondansetron (ZOFRAN) 4 MG tablet Take 1 tablet (4 mg total) by mouth every 8 (eight) hours as needed. 8/23/15   Ketty Abbott DO   ondansetron (ZOFRAN) 4 MG tablet Take 8 mg by mouth 2 (two) times daily.    Historical Provider, MD   ondansetron (ZOFRAN-ODT) 4 MG TbDL Take 1 tablet (4 mg total) by mouth every 8 (eight) hours as needed (nausea). 1/15/19   Alonso Nathan Jr., MD   pantoprazole (PROTONIX) 20 MG tablet Take 20 mg by mouth once daily.    Historical Provider, MD   potassium chloride SA (K-DUR,KLOR-CON) 20 MEQ tablet Take 1 tablet (20 mEq total) by mouth once daily. 8/23/15   Maritza Orourke MD   ranitidine (ZANTAC) 150 MG tablet TK 1 T PO HS 2/3/17   Historical Provider, MD   SPIRIVA WITH HANDIHALER 18 mcg inhalation capsule INHALE 1 C PO QD 16   Historical Provider, MD   sucralfate (CARAFATE) 100 mg/mL suspension Take 10 mLs (1 g total) by mouth 4 (four) times daily with meals and nightly. 3/20/14   Pollo Christian MD   VENTOLIN HFA 90 mcg/actuation inhaler INL 2 PFS PO Q 4 H PRN 17   Historical Provider, MD       Family History:  Patient gave a very vague family history but reports that her mother  from heart disease and her father  after a major stroke. She is 1 of 16 children but reports that she is one of only 5 remaining. She reports that cancer runs in her family and several of her siblings  from "stage IV" cancer but she is unsure of the type.   Social History:  Social History     Tobacco Use    " "Smoking status: Current Every Day Smoker     Packs/day: 1.00     Years: 43.00     Pack years: 43.00     Types: Cigarettes   Substance Use Topics    Alcohol use: No    Drug use: Yes     Types: Marijuana     Comment: occasional       Review of Systems:  Pertinent items are noted in HPI. All other systems are reviewed and are negative.    Health Maintaince :   Primary Care Physician: Dr. Bowden  Immunizations:   TDap is up to date.  Influenza is not up to date.  Pneumovax is not up to date.  Cancer Screening:  PAP: is not up to date.  Mammogram: is not up to date.   Colonoscopy: is up to date.      Objective:   Last 24 Hour Vital Signs:  BP  Min: 114/85  Max: 181/116  Temp  Av.2 °F (36.8 °C)  Min: 97.9 °F (36.6 °C)  Max: 98.5 °F (36.9 °C)  Pulse  Av.7  Min: 63  Max: 104  Resp  Av  Min: 17  Max: 17  SpO2  Av %  Min: 95 %  Max: 99 %  Height  Av' 4" (162.6 cm)  Min: 5' 4" (162.6 cm)  Max: 5' 4" (162.6 cm)  Weight  Av.4 kg (120 lb)  Min: 54.4 kg (120 lb)  Max: 54.4 kg (120 lb)  Body mass index is 20.6 kg/m².  No intake/output data recorded.    Physical Examination:  /79 (BP Location: Right arm, Patient Position: Lying)   Pulse 63   Temp 98.5 °F (36.9 °C) (Oral)   Resp 17   Ht 5' 4" (1.626 m)   Wt 54.4 kg (120 lb)   LMP  (LMP Unknown)   SpO2 97%   BMI 20.60 kg/m²   General appearance: Awake, alert, oriented to person, place, time, and situation, NAD  Head: Normocephalic, atraumatic  Eyes: EOMI, PERRL  Nose: Boggy mucosal membranes noted, No drainage, no purulence  Throat: Mild erythema noted in posterior oropharynx, No exudate, Poor dentition  Neck: JVP WNL, Neck Supple Trachea Midline  Back: Non-tender, Normal Curvature  Lungs: CTAB, No wheezes, Crackles, Or rhonchi appreciated  Heart: RRR, No murmurs, gallups, or rubs appreciated  Abdomen: Soft, non-distended, + BS, No rebound, Wade's negative, Very mild tenderness noted over RLQ in the area of right inguinal " crease  Extremities: Atraumatic, No edema present  Pulses: 2+  Skin: No rashes or skin changes noted, Normal skin turgor, no bruising   Neurologic: Moving all 4 extremities equally, normal gate      Laboratory:  Most Recent Data:  CBC:   Lab Results   Component Value Date    WBC 11.68 01/25/2019    HGB 18.0 (H) 01/25/2019    HCT 54.0 (H) 01/25/2019     01/25/2019    MCV 98 01/25/2019    RDW 14.3 01/25/2019       BMP:   Lab Results   Component Value Date     (L) 01/25/2019    K 4.1 01/25/2019    CL 96 01/25/2019    CO2 25 01/25/2019    BUN 18 01/25/2019    CREATININE 1.1 01/25/2019     01/25/2019    CALCIUM 10.6 (H) 01/25/2019    MG 1.6 12/21/2016    PHOS 3.5 01/24/2014     LFTs:   Lab Results   Component Value Date    PROT 8.4 01/25/2019    ALBUMIN 4.7 01/25/2019    BILITOT 0.4 01/25/2019    AST 39 01/25/2019    ALKPHOS 97 01/25/2019    ALT 42 01/25/2019     Coags:   Lab Results   Component Value Date    INR 1.1 07/21/2016     FLP:   Lab Results   Component Value Date    CHOL 126 12/17/2016    HDL 22 (L) 12/17/2016    LDLCALC 73.4 12/17/2016    TRIG 153 (H) 12/17/2016    CHOLHDL 17.5 (L) 12/17/2016     DM:   Lab Results   Component Value Date    HGBA1C 6.1 12/17/2016    HGBA1C 5.4 07/27/2013    LDLCALC 73.4 12/17/2016    CREATININE 1.1 01/25/2019     Thyroid:   Lab Results   Component Value Date    TSH 0.131 (L) 12/17/2016    FREET4 0.87 12/17/2016     Anemia:   Lab Results   Component Value Date    IRON 50 12/19/2016    TIBC 318 12/19/2016    FERRITIN 136 12/19/2016    DHIEHQFW13 860 12/19/2016    FOLATE 7.2 12/19/2016     Cardiac:   Lab Results   Component Value Date    TROPONINI <0.006 01/15/2019     (H) 01/24/2014     Urinalysis:   Lab Results   Component Value Date    LABURIN No growth 01/23/2014    COLORU Yellow 01/25/2019    SPECGRAV 1.020 01/25/2019    NITRITE Negative 01/25/2019    KETONESU Negative 01/25/2019    UROBILINOGEN Negative 01/25/2019    WBCUA 4 01/27/2016       Trended  Lab Data:  Recent Labs   Lab 01/25/19 0924   WBC 11.68   HGB 18.0*   HCT 54.0*      MCV 98   RDW 14.3   *   K 4.1   CL 96   CO2 25   BUN 18   CREATININE 1.1      PROT 8.4   ALBUMIN 4.7   BILITOT 0.4   AST 39   ALKPHOS 97   ALT 42       Trended Cardiac Data:  No results for input(s): TROPONINI, CKTOTAL, CKMB, BNP in the last 168 hours.    Microbiology Data:  Microbiology Results (last 7 days)     Procedure Component Value Units Date/Time    Blood culture [[id number]] Collected:  01/25/19 0924    Order Status:  No result Specimen:  Blood Updated:  01/25/19 1335    Blood culture [[id number]] Collected:  01/25/19 0924    Order Status:  No result Specimen:  Blood Updated:  01/25/19 1333    Blood culture [[id number]]     Order Status:  Completed Specimen:  Blood     Blood culture [[id number]]     Order Status:  Completed Specimen:  Blood     Blood culture [[id number]]     Order Status:  Canceled Specimen:  Blood     Blood culture [[id number]]     Order Status:  Canceled Specimen:  Blood     Blood culture #1 **CANNOT BE ORDERED STAT** [[id number]]     Order Status:  Canceled Specimen:  Blood     Blood culture #2 **CANNOT BE ORDERED STAT** [[id number]]     Order Status:  Canceled Specimen:  Blood     Stool culture [[id number]]     Order Status:  No result Specimen:  Stool     Clostridium difficile EIA [[id number]]     Order Status:  No result Specimen:  Stool           Radiology:  Imaging Results          CT Abdomen Pelvis With Contrast (Final result)  Result time 01/25/19 15:27:15    Final result by Romario Ortiz MD (01/25/19 15:27:15)                 Impression:      1. Prominent venous vasculature within the adnexa, correlation with any history of pelvic congestion syndrome recommended.  2. Distention of the stomach with ingested liquid, without wall thickening.  3. Moderate stool in the colon.  4. Several additional findings above.      Electronically signed by: Romario Ortiz  MD  Date:    01/25/2019  Time:    15:27             Narrative:    EXAMINATION:  CT ABDOMEN PELVIS WITH CONTRAST    CLINICAL HISTORY:  Abd pain, fever, abscess suspected; Generalized abdominal pain    TECHNIQUE:  Low dose axial images, sagittal and coronal reformations were obtained from the lung bases to the pubic symphysis following the IV administration of 75 mL of Omnipaque 350 .  Oral contrast was not given.    COMPARISON:  01/15/2019    FINDINGS:  Images of the lower thorax are remarkable for scattered emphysematous change.    The liver, spleen, pancreas, gallbladder and adrenal glands are grossly unremarkable.  There is no biliary dilation or ascites.  The portal vein, splenic vein, SMV, celiac axis and SMA all are patent.  The stomach is distended with ingested liquid.    The kidneys enhance symmetrically without hydronephrosis or nephrolithiasis.  Punctate hypoattenuating lesions are noted within the kidneys bilaterally, too small for characterization.  The bilateral ureters are unable to be followed in their entirety to the urinary bladder, no definite calculi along their visualized courses, and no secondary findings to suggest obstructive uropathy.  The urinary bladder is decompressed, limiting evaluation.  The uterus and adnexa is remarkable for prominent venous vasculature in the adnexa bilaterally.  Correlation with any history of pelvic congestion syndrome.    There are several scattered colonic diverticula, no definite surrounding inflammation allowing for motion artifact.  The appendix and terminal ileum are grossly unremarkable.  The small bowel is grossly unremarkable.  No focal organized pelvic fluid collection.  There is atherosclerotic calcification of the aorta and its branches.    Degenerative changes are noted of the spine.  Postsurgical changes are noted of the spine.  Spinal fusion hardware spans L5-S1 noting grade 1-2 anterolisthesis of L5 in relationship to S1.  This appears stable as  compared to the previous exam.  No significant inguinal lymphadenopathy.                                    Assessment:     Amaya Hall is a 53 y.o. female with a PMHx of HTN, COPD, Seizure disorder, and HLD presenting at [facility] with Abdominal pain x 2 months with bacteremia noted on previous blood cultures on 1/15/2019.      Plan:     Bacteroides Ovales Bacteremia on 1/15/19  - 1/4 bottles positive.   - Noted on previous blood culture from 1/15/2019.   - A febrile. VSS. WBC WNL   - Suffers from chronic abdominal pain of unknown etiology   - Repeat CT abdomen results as above   - Will continue flagyl. Repeat Blood cultures pending   - Admit for observation.    Benign essential HTN   -Continue Lisinopril 20 mg daily     Seizure disorder    -Reports that last seizure was several weeks ago  -Currently Asymptomatic   -Continue home Gabapentin 600 mg TID    HLD  Continue home Atorvastatin 80 mg daily     COPD   -Continue Spiriva and PRN HFA inhaler      Code Status:     FULL Code       [unfilled]  [facility] IM Service Team B    [facility] Medicine Hospitalist Pager numbers:   [facility] Hospitalist Medicine Team A (Diamond/Francine): [phone]  [facility] Hospitalist Medicine Team B (Chastity/Jeff):  [phone]

## 2019-01-25 NOTE — ED NOTES
Pt eating sandwich and juice at this time. No distress noted. Pt's blood pressure is elevated x 2. Paged Resident on call for Dr. Aguilar. Awaiting return phone call.

## 2019-01-26 PROBLEM — R45.1 AGITATION: Status: ACTIVE | Noted: 2019-01-26

## 2019-01-26 PROBLEM — E83.52 HYPERCALCEMIA: Status: ACTIVE | Noted: 2019-01-26

## 2019-01-26 LAB
ALBUMIN SERPL BCP-MCNC: 4 G/DL
ALP SERPL-CCNC: 82 U/L
ALT SERPL W/O P-5'-P-CCNC: 30 U/L
AMPHET+METHAMPHET UR QL: NORMAL
ANION GAP SERPL CALC-SCNC: 9 MMOL/L
AST SERPL-CCNC: 25 U/L
BARBITURATES UR QL SCN>200 NG/ML: NEGATIVE
BASOPHILS # BLD AUTO: 0.01 K/UL
BASOPHILS NFR BLD: 0.1 %
BENZODIAZ UR QL SCN>200 NG/ML: NEGATIVE
BILIRUB SERPL-MCNC: 0.5 MG/DL
BUN SERPL-MCNC: 14 MG/DL
BZE UR QL SCN: NEGATIVE
CALCIUM SERPL-MCNC: 9.9 MG/DL
CANNABINOIDS UR QL SCN: NEGATIVE
CHLORIDE SERPL-SCNC: 100 MMOL/L
CO2 SERPL-SCNC: 29 MMOL/L
CREAT SERPL-MCNC: 0.9 MG/DL
CREAT UR-MCNC: 173.4 MG/DL
DIFFERENTIAL METHOD: ABNORMAL
EOSINOPHIL # BLD AUTO: 0.1 K/UL
EOSINOPHIL NFR BLD: 1.2 %
ERYTHROCYTE [DISTWIDTH] IN BLOOD BY AUTOMATED COUNT: 13.7 %
EST. GFR  (AFRICAN AMERICAN): >60 ML/MIN/1.73 M^2
EST. GFR  (NON AFRICAN AMERICAN): >60 ML/MIN/1.73 M^2
GLUCOSE SERPL-MCNC: 88 MG/DL
HCT VFR BLD AUTO: 51.2 %
HGB BLD-MCNC: 16.5 G/DL
LYMPHOCYTES # BLD AUTO: 5.1 K/UL
LYMPHOCYTES NFR BLD: 57 %
MCH RBC QN AUTO: 32.3 PG
MCHC RBC AUTO-ENTMCNC: 32.2 G/DL
MCV RBC AUTO: 100 FL
METHADONE UR QL SCN>300 NG/ML: NEGATIVE
MONOCYTES # BLD AUTO: 0.9 K/UL
MONOCYTES NFR BLD: 9.7 %
NEUTROPHILS # BLD AUTO: 2.8 K/UL
NEUTROPHILS NFR BLD: 31.8 %
OPIATES UR QL SCN: NORMAL
PCP UR QL SCN>25 NG/ML: NEGATIVE
PLATELET # BLD AUTO: 307 K/UL
PMV BLD AUTO: 9 FL
POTASSIUM SERPL-SCNC: 4 MMOL/L
PROT SERPL-MCNC: 7.1 G/DL
PTH-INTACT SERPL-MCNC: 59.4 PG/ML
RBC # BLD AUTO: 5.11 M/UL
SODIUM SERPL-SCNC: 138 MMOL/L
TOXICOLOGY INFORMATION: NORMAL
WBC # BLD AUTO: 8.93 K/UL

## 2019-01-26 PROCEDURE — S0030 INJECTION, METRONIDAZOLE: HCPCS | Performed by: STUDENT IN AN ORGANIZED HEALTH CARE EDUCATION/TRAINING PROGRAM

## 2019-01-26 PROCEDURE — 80053 COMPREHEN METABOLIC PANEL: CPT

## 2019-01-26 PROCEDURE — 25000003 PHARM REV CODE 250: Performed by: STUDENT IN AN ORGANIZED HEALTH CARE EDUCATION/TRAINING PROGRAM

## 2019-01-26 PROCEDURE — 85025 COMPLETE CBC W/AUTO DIFF WBC: CPT

## 2019-01-26 PROCEDURE — 83970 ASSAY OF PARATHORMONE: CPT

## 2019-01-26 PROCEDURE — 94640 AIRWAY INHALATION TREATMENT: CPT

## 2019-01-26 PROCEDURE — 80307 DRUG TEST PRSMV CHEM ANLYZR: CPT

## 2019-01-26 PROCEDURE — 63600175 PHARM REV CODE 636 W HCPCS: Performed by: STUDENT IN AN ORGANIZED HEALTH CARE EDUCATION/TRAINING PROGRAM

## 2019-01-26 PROCEDURE — G0378 HOSPITAL OBSERVATION PER HR: HCPCS

## 2019-01-26 PROCEDURE — S4991 NICOTINE PATCH NONLEGEND: HCPCS | Performed by: STUDENT IN AN ORGANIZED HEALTH CARE EDUCATION/TRAINING PROGRAM

## 2019-01-26 PROCEDURE — 94761 N-INVAS EAR/PLS OXIMETRY MLT: CPT

## 2019-01-26 PROCEDURE — 25000242 PHARM REV CODE 250 ALT 637 W/ HCPCS: Performed by: STUDENT IN AN ORGANIZED HEALTH CARE EDUCATION/TRAINING PROGRAM

## 2019-01-26 PROCEDURE — 87040 BLOOD CULTURE FOR BACTERIA: CPT

## 2019-01-26 PROCEDURE — 36415 COLL VENOUS BLD VENIPUNCTURE: CPT

## 2019-01-26 PROCEDURE — 25000003 PHARM REV CODE 250: Performed by: INTERNAL MEDICINE

## 2019-01-26 RX ORDER — ACETAMINOPHEN 325 MG/1
650 TABLET ORAL EVERY 6 HOURS PRN
Status: DISCONTINUED | OUTPATIENT
Start: 2019-01-26 | End: 2019-01-28 | Stop reason: HOSPADM

## 2019-01-26 RX ORDER — DICYCLOMINE HYDROCHLORIDE 10 MG/1
20 CAPSULE ORAL 4 TIMES DAILY
Status: DISCONTINUED | OUTPATIENT
Start: 2019-01-26 | End: 2019-01-28 | Stop reason: HOSPADM

## 2019-01-26 RX ORDER — KETOROLAC TROMETHAMINE 30 MG/ML
30 INJECTION, SOLUTION INTRAMUSCULAR; INTRAVENOUS ONCE
Status: COMPLETED | OUTPATIENT
Start: 2019-01-26 | End: 2019-01-26

## 2019-01-26 RX ORDER — OXYCODONE AND ACETAMINOPHEN 5; 325 MG/1; MG/1
1 TABLET ORAL ONCE
Status: COMPLETED | OUTPATIENT
Start: 2019-01-26 | End: 2019-01-26

## 2019-01-26 RX ORDER — OXYCODONE AND ACETAMINOPHEN 5; 325 MG/1; MG/1
1 TABLET ORAL EVERY 6 HOURS PRN
Status: DISCONTINUED | OUTPATIENT
Start: 2019-01-26 | End: 2019-01-26

## 2019-01-26 RX ORDER — HYOSCYAMINE SULFATE 0.5 MG/ML
0.5 INJECTION, SOLUTION SUBCUTANEOUS 4 TIMES DAILY PRN
Status: DISCONTINUED | OUTPATIENT
Start: 2019-01-26 | End: 2019-01-28 | Stop reason: HOSPADM

## 2019-01-26 RX ADMIN — GABAPENTIN 600 MG: 600 TABLET, FILM COATED ORAL at 08:01

## 2019-01-26 RX ADMIN — DICYCLOMINE HYDROCHLORIDE 10 MG: 10 CAPSULE ORAL at 12:01

## 2019-01-26 RX ADMIN — GABAPENTIN 600 MG: 600 TABLET, FILM COATED ORAL at 03:01

## 2019-01-26 RX ADMIN — TIOTROPIUM BROMIDE 18 MCG: 18 CAPSULE ORAL; RESPIRATORY (INHALATION) at 08:01

## 2019-01-26 RX ADMIN — HEPARIN SODIUM 5000 UNITS: 5000 INJECTION, SOLUTION INTRAVENOUS; SUBCUTANEOUS at 06:01

## 2019-01-26 RX ADMIN — LISINOPRIL 20 MG: 20 TABLET ORAL at 08:01

## 2019-01-26 RX ADMIN — HYOSCYAMINE SULFATE 0.5 MG: 0.5 INJECTION, SOLUTION SUBCUTANEOUS at 11:01

## 2019-01-26 RX ADMIN — DULOXETINE 30 MG: 30 CAPSULE, DELAYED RELEASE ORAL at 08:01

## 2019-01-26 RX ADMIN — HYOSCYAMINE SULFATE 0.5 MG: 0.5 INJECTION, SOLUTION SUBCUTANEOUS at 09:01

## 2019-01-26 RX ADMIN — METRONIDAZOLE 500 MG: 500 INJECTION, SOLUTION INTRAVENOUS at 04:01

## 2019-01-26 RX ADMIN — ONDANSETRON 4 MG: 2 INJECTION INTRAMUSCULAR; INTRAVENOUS at 12:01

## 2019-01-26 RX ADMIN — DICYCLOMINE HYDROCHLORIDE 10 MG: 10 CAPSULE ORAL at 08:01

## 2019-01-26 RX ADMIN — OXYCODONE HYDROCHLORIDE AND ACETAMINOPHEN 1 TABLET: 5; 325 TABLET ORAL at 12:01

## 2019-01-26 RX ADMIN — NICOTINE 1 PATCH: 21 PATCH, EXTENDED RELEASE TRANSDERMAL at 08:01

## 2019-01-26 RX ADMIN — METRONIDAZOLE 500 MG: 500 INJECTION, SOLUTION INTRAVENOUS at 02:01

## 2019-01-26 RX ADMIN — FLUTICASONE FUROATE AND VILANTEROL TRIFENATATE 1 PUFF: 100; 25 POWDER RESPIRATORY (INHALATION) at 08:01

## 2019-01-26 RX ADMIN — ACETAMINOPHEN 650 MG: 325 TABLET ORAL at 07:01

## 2019-01-26 RX ADMIN — DICYCLOMINE HYDROCHLORIDE 20 MG: 10 CAPSULE ORAL at 08:01

## 2019-01-26 RX ADMIN — KETOROLAC TROMETHAMINE 30 MG: 30 INJECTION, SOLUTION INTRAMUSCULAR at 09:01

## 2019-01-26 RX ADMIN — DICYCLOMINE HYDROCHLORIDE 20 MG: 10 CAPSULE ORAL at 04:01

## 2019-01-26 RX ADMIN — HEPARIN SODIUM 5000 UNITS: 5000 INJECTION, SOLUTION INTRAVENOUS; SUBCUTANEOUS at 03:01

## 2019-01-26 NOTE — PROGRESS NOTES
"[facility] IM Resident HO-II Progress Note    Subjective:      Patient with continued pain overnight, which is unchanged in quality. She reports that she had some improvement with the IV Hyoscyamine and is amenable to trying it again today. She is very anxious this morning and is asking about to be checked for cancer as she is concerned this is what may be causing her pain.      Objective:   Last 24 Hour Vital Signs:  BP  Min: 114/85  Max: 181/116  Temp  Av.8 °F (36.6 °C)  Min: 96.5 °F (35.8 °C)  Max: 98.5 °F (36.9 °C)  Pulse  Av.9  Min: 56  Max: 104  Resp  Av.8  Min: 14  Max: 17  SpO2  Av %  Min: 95 %  Max: 99 %  Height  Av' 4" (162.6 cm)  Min: 5' 4" (162.6 cm)  Max: 5' 4" (162.6 cm)  Weight  Av.8 kg (114 lb 3.7 oz)  Min: 49.2 kg (108 lb 7.5 oz)  Max: 54.4 kg (120 lb)  No intake/output data recorded.    Physical Examination:  General appearance: Awake, alert, oriented to person, place, time, and situation, Mild distress  Head: Normocephalic, atraumatic  Eyes: EOMI, PERRL  Nose: Boggy mucosal membranes noted, No drainage, no purulence  Throat: Mild erythema noted in posterior oropharynx, No exudate, Poor dentition  Neck: JVP WNL, Neck Supple Trachea Midline  Back: Non-tender, Normal Curvature  Lungs: CTAB, No wheezes, Crackles, Or rhonchi appreciated  Heart: RRR, No murmurs, gallups, or rubs appreciated  Abdomen: Soft, non-distended, + BS, No rebound, Wade's negative, mild tenderness noted over RLQ in the area of right inguinal crease  Extremities: Atraumatic, No edema present  Pulses: 2+  Skin: No rashes or skin changes noted, Normal skin turgor, no bruising   Neurologic: Moving all 4 extremities equally, normal gate       Laboratory:  Laboratory Data Reviewed:   Pertinent Findings:  Recent Labs     19  0924 19  0410   WBC 11.68 8.93   HGB 18.0* 16.5*   HCT 54.0* 51.2*    307   MCV 98 100*   RDW 14.3 13.7   GRAN 56.0  6.5 31.8*  2.8   LYMPH 34.2  4.0 57.0*  5.1*   MONO " 8.9  1.0 9.7  0.9   EOS 0.1 0.1   BASO 0.02 0.01   EOSINOPHIL 0.7 1.2   BASOPHIL 0.2 0.1       Recent Labs     01/25/19  0924 01/26/19  0410   * 138   K 4.1 4.0   CL 96 100   CO2 25 29   BUN 18 14   CREATININE 1.1 0.9    88     No results for input(s): POCTGLUCOSE in the last 72 hours.  Recent Labs     01/25/19  0924 01/26/19  0410   PROT 8.4 7.1   ALBUMIN 4.7 4.0   BILITOT 0.4 0.5   AST 39 25   ALT 42 30   ALKPHOS 97 82         Microbiology Data Reviewed:   Pertinent Findings:        Radiology Data Reviewed:   Pertinent Findings:  Imaging Results          CT Abdomen Pelvis With Contrast (Final result)  Result time 01/25/19 15:27:15    Final result by Romario Ortiz MD (01/25/19 15:27:15)                 Impression:      1. Prominent venous vasculature within the adnexa, correlation with any history of pelvic congestion syndrome recommended.  2. Distention of the stomach with ingested liquid, without wall thickening.  3. Moderate stool in the colon.  4. Several additional findings above.      Electronically signed by: Romario Ortiz MD  Date:    01/25/2019  Time:    15:27             Narrative:    EXAMINATION:  CT ABDOMEN PELVIS WITH CONTRAST    CLINICAL HISTORY:  Abd pain, fever, abscess suspected; Generalized abdominal pain    TECHNIQUE:  Low dose axial images, sagittal and coronal reformations were obtained from the lung bases to the pubic symphysis following the IV administration of 75 mL of Omnipaque 350 .  Oral contrast was not given.    COMPARISON:  01/15/2019    FINDINGS:  Images of the lower thorax are remarkable for scattered emphysematous change.    The liver, spleen, pancreas, gallbladder and adrenal glands are grossly unremarkable.  There is no biliary dilation or ascites.  The portal vein, splenic vein, SMV, celiac axis and SMA all are patent.  The stomach is distended with ingested liquid.    The kidneys enhance symmetrically without hydronephrosis or nephrolithiasis.  Punctate  hypoattenuating lesions are noted within the kidneys bilaterally, too small for characterization.  The bilateral ureters are unable to be followed in their entirety to the urinary bladder, no definite calculi along their visualized courses, and no secondary findings to suggest obstructive uropathy.  The urinary bladder is decompressed, limiting evaluation.  The uterus and adnexa is remarkable for prominent venous vasculature in the adnexa bilaterally.  Correlation with any history of pelvic congestion syndrome.    There are several scattered colonic diverticula, no definite surrounding inflammation allowing for motion artifact.  The appendix and terminal ileum are grossly unremarkable.  The small bowel is grossly unremarkable.  No focal organized pelvic fluid collection.  There is atherosclerotic calcification of the aorta and its branches.    Degenerative changes are noted of the spine.  Postsurgical changes are noted of the spine.  Spinal fusion hardware spans L5-S1 noting grade 1-2 anterolisthesis of L5 in relationship to S1.  This appears stable as compared to the previous exam.  No significant inguinal lymphadenopathy.                                  Current Medications:     Infusions:       Scheduled:   dicyclomine  10 mg Oral QID    DULoxetine  30 mg Oral Daily    fluticasone-vilanterol  1 puff Inhalation Daily    gabapentin  600 mg Oral TID    heparin (porcine)  5,000 Units Subcutaneous Q8H    lisinopril  20 mg Oral Daily    metronidazole  500 mg Intravenous Q8H    nicotine  1 patch Transdermal Daily    tiotropium  1 capsule Inhalation Daily        PRN:  albuterol, hyoscyamine, ondansetron, pneumoc 13-rakel conj-dip cr(PF), sodium chloride 0.9%    Antibiotics and Day Number of Therapy:  None    Lines and Day Number of Therapy:  PIV Antecubital PIV    Assessment:     Amaya Hall is a 53 y.o.female with  Patient Active Problem List    Diagnosis Date Noted    Gram-negative bacteremia  01/25/2019    Wheezing     Abdominal pain 03/30/2017    Gastritis 03/30/2017    Group G streptococcal infection 12/30/2016    Anemia 12/20/2016    Pulmonary emphysema 12/19/2016    Depression 12/19/2016    Anemia of chronic disease 12/18/2016    Pneumonia of right upper lobe due to infectious organism 12/18/2016    Chest pain 12/17/2016    Lesion of right lung 12/17/2016    CKD (chronic kidney disease) stage 3, GFR 30-59 ml/min 12/17/2016    GERD (gastroesophageal reflux disease) 12/17/2016    Depressed 12/17/2016    Tobacco abuse 12/17/2016    Essential hypertension 12/17/2016    H/O peptic ulcer 12/17/2016    Weight loss 12/17/2016    Sepsis 12/17/2016    Consolidation of right upper lobe 12/17/2016    Coffee ground emesis     Epigastric pain 01/23/2014    Hypokalemia 07/26/2013        Plan:     Bacteroides Ovatus Bacteremia on 1/15/19  - 1/4 bottles positive.   - Noted on previous blood culture from 1/15/2019.   - A febrile. VSS. WBC WNL   - Suffers from chronic abdominal pain of unknown etiology   - Repeat CT abdomen results as above   - Will continue flagyl. Repeat Blood cultures pending   - Admit for observation.    Abdominal Pain   RLQ  -Unclear etiology at this time  - CT abdomen and pelvis shows increased vascularity/ Vascular congestion in Pelvis  -Consider OBGYN consult.     Benign essential HTN   -Continue Lisinopril 20 mg daily     Seizure disorder    -Reports that last seizure was several weeks ago  -Currently Asymptomatic   -Continue home Gabapentin 600 mg TID    HLD  Continue home Atorvastatin 80 mg daily     COPD   -Continue Spiriva and PRN HFA inhaler    Pulmonary Nodule/Scarring   -Patient reports that she follows with Dr. Holley   -Repeat CT scan on 12/11/2018 showed  Severe emphysema. Scarring right apical region, previously seen area of cavitation is no longer seen.  Follow-up in 12 months advised to ensure stability.      Neville Quiñones  [facility]  HO-II  [facility] IM Service Team B    [facility] Medicine Hospitalist Pager numbers:   [facility] Hospitalist Medicine Team A (Diamond/Francine): [phone]  [facility] Hospitalist Medicine Team B (Chastity/Jeff):  [phone]

## 2019-01-26 NOTE — NURSING
Notified Dr Rahman that pt had a vomiting episode around 0915, pt has no nausea medication at current time; pt is complaining of nausea.  MD will put the order.

## 2019-01-26 NOTE — PLAN OF CARE
Problem: Adult Inpatient Plan of Care  Goal: Plan of Care Review  Outcome: Ongoing (interventions implemented as appropriate)   01/26/19 0533   Plan of Care Review   Plan of Care Reviewed With patient   POC reviewed with the pt, verbalized understanding.  AAOx4.  Complaint of R groin area, percocet given x2 during shift until now.  On continuous telemetry monitor, NSR.  No ectopy noted.  IV flagyl given.  Special contact isolation initiated, no BM during night.  Safety maintained, free of falls throughout shift.  Instructed to call for any assistance.  Will continue to monitor.

## 2019-01-26 NOTE — NURSING
Informed Dr. Rahman that pt is in pain on R groin area, pt stated she would like to take pain medication that pt had it from ER.  MD will order medication for the pain.

## 2019-01-26 NOTE — NURSING
"Pt and daughter states, " do not give any antidepressants to patient again or there will be hell to pay" Daughter states that her mom( the patient) almost killed herself many years ago on an antidepressant. (Zoloft). Spoke with [facility] med team and they are coming to see her.  "

## 2019-01-26 NOTE — NURSING
"Pt irate at this time. Verbally abusive. States" no one is checking on her, or addressing her pain." [facility] team notified and charge nurse notified. Will continue to monitor.   "

## 2019-01-26 NOTE — CONSULTS
ID Staff Note:    Patient interviewed and examined - chart reviewed.  54 y/o woman with several year history of intermittent abdominal pain associated with N/V and diarrhea.  This impacts her weight which varies between 100-120#.  Daughter and patient relate multiple GI work ups for this without definitive diagnosis, except for EGD-proven gastric ulcer in past.  Per [facility] GI evaluation 2017 - no association with food.    On meds for GERD -   Latest GI evaluation done elsewhere - had colonoscopy - no official report.  Family and patient state it was without issues, but verbal report thru resident was that some polyps were found.   This follows ED visit Sept 2018, and Alexandre 15, 2019.  Consult is for positive BC from 1/15/2019 for Bacteroides ovatus (1 bottle of 4).      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 in late 2016 - repeat sputum AFB x 4 all negative (no treatment);  Quantiferon Gold indeterminant    Seizure disorder - not listed in problem list - states grand mal seizures arose de [place] (no serious illness, no ETOH, no trauma) - has been on gabapentin;  Reduces frequency of seizuures to about 1-2/ month    Depression - since death of godmother to daughter;  Has problems with antidepressants;  Was seeing mental health;  States no other psych diagnoses    HTN  HLD  S/p back surgery;  No abdomninal surgery  CKD 3 in past    Exam:  A&O x 3;  Complains lower abdominal pain - no BM's x 24 hours  Skin:  No rash, one small healing abrasion right knee (from previous fall due to Sz)  HEENT:  PEARLA, conjunctive normal, EOMI; no sinus tenderness;  Mouth - tongue coated, pharynx clear;  Edentulous except for 2 stubs lower jaw  Neck - supple, no JVD (sitting 45 degrees), no adenopathy  Lungs - clear all fields;  Coarse sounds  Heart:  RRR - no m/g  Abdomen:  Tender to palpation RLQ near crease to thigh; non distended; good BS's  Ext: no c/c/e    LABS: reviewed -  as before - leucocytosis on admission though this time 11,000 instead of 20,000+     Crit 54 (was 35-30) Creat 1.1 Lytes and LFT's normal   TSH low at 0.131   Multiple BC pending    Radiology: CXR shows residual from previous RUL pneumonia    Abdominal CT - has appendolith; no other obvious pathology; has diverticuli    IMP and REC's:  Middle-aged woman with known medical conditions of COPD (on inhaled but not on oral steroids), HTN, GERD, past PUD, HLD - with chronic intermittent abdominal pain, s/p several evaluations without definitive cause   Now with one BC positive for enteric anaerobe, Bacteroides ovatus (in the B. Fragilis group)   On appropriate antibiotics   This organism basically points to abdomen as having pathology, which is already known - and should not cause metastatic infection or chronic problems   Would continue focused antibiotics - metronidazole - while work up continues   Discussed with primary team   Following

## 2019-01-26 NOTE — NURSING
Informed Dr. Herrera team that pt has same site pain rating as 6-7 and requested to have pain medication at current time.  MD will put prn pain order.

## 2019-01-26 NOTE — NURSING
"Pt wanted new IV. Right AC bothering her. Started a new one. Patient on the phone with her  and told him to" bring the big bottle of tylenol she has because if that's what we are going to give her, dont charge medicaid and she'll take her own". States " they want me to OD, then I will." Will continue to monitor.  "

## 2019-01-26 NOTE — NURSING
Pt transferred from ED via wheelchair. Pt AAOx4 and able to tell staff her needs. Skin warm and dry to touch. Respirations even and nonlabored at this time. Pt denies pain when asked. Pt oriented to room and staff. Call light within reach and bed alarm on at this time.

## 2019-01-26 NOTE — PLAN OF CARE
Problem: Adult Inpatient Plan of Care  Goal: Plan of Care Review  Outcome: Ongoing (interventions implemented as appropriate)  Pt received on RA.  SPO2  97%.  Pt in no apparent respiratory distress.  Will continue to monitor.

## 2019-01-26 NOTE — CONSULTS
Dx. Bipolar disorder manic without Psychotic features.  Family and patient was given options of mood stabilizer and Suboxone for bipolar illness and pain .

## 2019-01-26 NOTE — NURSING
"Pt states whatever we gave her is "making her feel funny, my body is shaking". She is crying and daughter now at bedside and wants to see a doctor. Calling Javier's team now. Will continue to monitor.  "

## 2019-01-26 NOTE — NURSING
Asked Dr. Herrera team that pt's new oxycodone order is discontinued, MD stated he will visualize pt then will order other pain medication.

## 2019-01-26 NOTE — NURSING
Went to check on patient's pain. Still 10/10. Making multiple threats towards doctors. Will continue to monitor.

## 2019-01-26 NOTE — CONSULTS
"General Surgery Service  H&P  Consult Note    Consult:  Abdominal Pain    HPI:  53F c chronic abdominal pain, associated with wt loss  All symptoms "come and go" - wt loss, pain, nausea/vomiting/diarrhea  "spot on R lung" smoker SOB walking up stairs or 1.5 blocks    PMH:  Past Medical History:   Diagnosis Date    Anxiety     COPD (chronic obstructive pulmonary disease)     Gastric ulcer, chronic     GERD (gastroesophageal reflux disease)     Hypercholesteremia     Hypertension          PSH:  Past Surgical History:   Procedure Laterality Date    BACK SURGERY      ESOPHAGOGASTRODUODENOSCOPY (EGD) N/A 3/30/2017    Performed by Constantine Avila MD at [facility]         FH:  History reviewed. No pertinent family history.      Social:  Social History     Socioeconomic History    Marital status: Single     Spouse name: Not on file    Number of children: Not on file    Years of education: Not on file    Highest education level: Not on file   Social Needs    Financial resource strain: Not on file    Food insecurity - worry: Not on file    Food insecurity - inability: Not on file    Transportation needs - medical: Not on file    Transportation needs - non-medical: Not on file   Occupational History    Not on file   Tobacco Use    Smoking status: Current Every Day Smoker     Packs/day: 1.00     Years: 43.00     Pack years: 43.00     Types: Cigarettes   Substance and Sexual Activity    Alcohol use: No    Drug use: Yes     Types: Marijuana     Comment: occasional    Sexual activity: Not on file   Other Topics Concern    Not on file   Social History Narrative    ** Merged History Encounter **              Allergies:  Review of patient's allergies indicates:   Allergen Reactions    Ultram [tramadol] Swelling         Meds:  No current facility-administered medications on file prior to encounter.      Current Outpatient Medications on File Prior to Encounter   Medication Sig Dispense Refill    alum-mag " hydroxide-simeth 225-200-25 mg/5 mL Susp Take as directed on bottle 360 mL 0    amitriptyline (ELAVIL) 25 MG tablet Take one pill by mouth nightly for pain. 30 tablet 0    atorvastatin (LIPITOR) 80 MG tablet Take 80 mg by mouth once daily.      budesonide-formoterol 160-4.5 mcg (SYMBICORT) 160-4.5 mcg/actuation HFAA Inhale 2 puffs into the lungs every 12 (twelve) hours.      dicyclomine (BENTYL) 20 mg tablet Take 1 tablet (20 mg total) by mouth 4 (four) times daily as needed (gi distress). 20 tablet 0    duloxetine (CYMBALTA) 30 MG capsule Take 30 mg by mouth once daily.      esomeprazole magnesium (NEXIUM 24HR) 20 mg TbEC Take 20 mg by mouth once daily. 90 tablet 1    gabapentin (NEURONTIN) 600 MG tablet Take 600 mg by mouth 3 (three) times daily.      gabapentin (NEURONTIN) 600 MG tablet Take 600 mg by mouth 3 (three) times daily.      hyoscyamine (LEVSIN/SL) 0.125 mg Subl Place 1 tablet (0.125 mg total) under the tongue every 8 (eight) hours as needed (abdominal pain). 50 tablet 1    lisinopril-hydrochlorothiazide (PRINZIDE,ZESTORETIC) 20-25 mg Tab Take 1 tablet by mouth once daily. 30 tablet 6    nicotine (NICODERM CQ) 21 mg/24 hr Place 1 patch onto the skin once daily. 1 patch 0    ondansetron (ZOFRAN) 4 MG tablet Take 1 tablet (4 mg total) by mouth every 8 (eight) hours as needed. 12 tablet 0    ondansetron (ZOFRAN) 4 MG tablet Take 8 mg by mouth 2 (two) times daily.      ondansetron (ZOFRAN-ODT) 4 MG TbDL Take 1 tablet (4 mg total) by mouth every 8 (eight) hours as needed (nausea). 12 tablet 0    pantoprazole (PROTONIX) 20 MG tablet Take 20 mg by mouth once daily.      potassium chloride SA (K-DUR,KLOR-CON) 20 MEQ tablet Take 1 tablet (20 mEq total) by mouth once daily. 5 tablet 0    ranitidine (ZANTAC) 150 MG tablet TK 1 T PO HS  5    SPIRIVA WITH HANDIHALER 18 mcg inhalation capsule INHALE 1 C PO QD  2    sucralfate (CARAFATE) 100 mg/mL suspension Take 10 mLs (1 g total) by mouth 4 (four)  times daily with meals and nightly. 420 mL 2    VENTOLIN HFA 90 mcg/actuation inhaler INL 2 PFS PO Q 4 H PRN  5         ROS:  Review of Systems   Constitutional: Positive for malaise/fatigue and weight loss.   Respiratory: Positive for sputum production and shortness of breath.    Cardiovascular: Negative for chest pain.   Gastrointestinal: Positive for abdominal pain, diarrhea, nausea and vomiting. Negative for blood in stool.   Neurological: Positive for weakness.         Vitals:  Vitals:    01/26/19 0747 01/26/19 0749 01/26/19 0835 01/26/19 0836   BP:  (!) 133/90     BP Location:  Right arm     Patient Position:  Lying     Pulse: (!) 58 83 76 77   Resp:  20 18 18   Temp:  97.6 °F (36.4 °C)     TempSrc:  Oral     SpO2:   97%    Weight:       Height:       I/O last 3 completed shifts:  In: 250 [P.O.:250]  Out: 1000 [Urine:1000]  No intake/output data recorded.        Intake/Output Summary (Last 24 hours) at 1/26/2019 1122  Last data filed at 1/26/2019 0300  Gross per 24 hour   Intake 250 ml   Output 1000 ml   Net -750 ml         Physical Exam:  Gen - No acute distress, awake/alert/oriented  HEENT - Normocephalic, atraumatic  CV - Regular rate and rhythm  Resp - normal work of breathing  Abd - Soft, not tender, not distended  Ext - Warm and well perfused    Vasc - palpable distal pulses BUE    Physical Exam        Recent Labs   Lab 01/26/19  0410   WBC 8.93   RBC 5.11   HGB 16.5*   HCT 51.2*      *   MCH 32.3*   MCHC 32.2     Recent Labs   Lab 01/26/19  0410      K 4.0      CO2 29   BUN 14   CREATININE 0.9     Recent Labs   Lab 01/26/19  0410   CALCIUM 9.9   PROT 7.1      K 4.0   CO2 29      BUN 14   CREATININE 0.9   ALKPHOS 82   ALT 30   AST 25   BILITOT 0.5   Radiology:  Imaging Results          CT Abdomen Pelvis With Contrast (Final result)  Result time 01/25/19 15:27:15    Final result by Romario Ortiz MD (01/25/19 15:27:15)                 Impression:      1.  Prominent venous vasculature within the adnexa, correlation with any history of pelvic congestion syndrome recommended.  2. Distention of the stomach with ingested liquid, without wall thickening.  3. Moderate stool in the colon.  4. Several additional findings above.      Electronically signed by: Romario Ortiz MD  Date:    01/25/2019  Time:    15:27             Narrative:    EXAMINATION:  CT ABDOMEN PELVIS WITH CONTRAST    CLINICAL HISTORY:  Abd pain, fever, abscess suspected; Generalized abdominal pain    TECHNIQUE:  Low dose axial images, sagittal and coronal reformations were obtained from the lung bases to the pubic symphysis following the IV administration of 75 mL of Omnipaque 350 .  Oral contrast was not given.    COMPARISON:  01/15/2019    FINDINGS:  Images of the lower thorax are remarkable for scattered emphysematous change.    The liver, spleen, pancreas, gallbladder and adrenal glands are grossly unremarkable.  There is no biliary dilation or ascites.  The portal vein, splenic vein, SMV, celiac axis and SMA all are patent.  The stomach is distended with ingested liquid.    The kidneys enhance symmetrically without hydronephrosis or nephrolithiasis.  Punctate hypoattenuating lesions are noted within the kidneys bilaterally, too small for characterization.  The bilateral ureters are unable to be followed in their entirety to the urinary bladder, no definite calculi along their visualized courses, and no secondary findings to suggest obstructive uropathy.  The urinary bladder is decompressed, limiting evaluation.  The uterus and adnexa is remarkable for prominent venous vasculature in the adnexa bilaterally.  Correlation with any history of pelvic congestion syndrome.    There are several scattered colonic diverticula, no definite surrounding inflammation allowing for motion artifact.  The appendix and terminal ileum are grossly unremarkable.  The small bowel is grossly unremarkable.  No focal organized  pelvic fluid collection.  There is atherosclerotic calcification of the aorta and its branches.    Degenerative changes are noted of the spine.  Postsurgical changes are noted of the spine.  Spinal fusion hardware spans L5-S1 noting grade 1-2 anterolisthesis of L5 in relationship to S1.  This appears stable as compared to the previous exam.  No significant inguinal lymphadenopathy.                                  Micro:  Microbiology Results (last 7 days)     Procedure Component Value Units Date/Time    Blood culture [[id number]] Collected:  01/26/19 0654    Order Status:  Sent Specimen:  Blood Updated:  01/26/19 0950    Blood culture [[id number]] Collected:  01/25/19 0924    Order Status:  Completed Specimen:  Blood Updated:  01/26/19 0115     Blood Culture, Routine No Growth to date    Blood culture [[id number]] Collected:  01/25/19 0924    Order Status:  Completed Specimen:  Blood Updated:  01/26/19 0115     Blood Culture, Routine No Growth to date    Blood culture [[id number]]     Order Status:  Completed Specimen:  Blood     Blood culture [[id number]]     Order Status:  Completed Specimen:  Blood     Blood culture [[id number]]     Order Status:  Canceled Specimen:  Blood     Blood culture [[id number]]     Order Status:  Canceled Specimen:  Blood     Blood culture #1 **CANNOT BE ORDERED STAT** [[id number]]     Order Status:  Canceled Specimen:  Blood     Blood culture #2 **CANNOT BE ORDERED STAT** [[id number]]     Order Status:  Canceled Specimen:  Blood     Stool culture [[id number]]     Order Status:  No result Specimen:  Stool     Clostridium difficile EIA [[id number]]     Order Status:  No result Specimen:  Stool               Assessment and Plan:  53F Vasculopath c Wt Loss, Heavy smoking hx, appears cachectic      CTAngio A/P c attn to mesenteric vasculature for r/o mesenteric ischemia -chronic 30 lb wt loss  CT chest non con  PTH, PTHrP  Colonoscopy, for poss ischemic colon, DONNIE looks gone - SMA/Celiac cheavy  calcification at origin  No acute surgical intervention    Miko Higginbotham MD

## 2019-01-26 NOTE — NURSING
"  Pt is very angry. Would not take her medication at first. Took them out of her cup and put them in her hand. I stated I need to see her take them or I need to get them back. She stated " who is going to take them from me". I then called the charge nurse and while I stepped out of the room she states she took them.   Wants to speak with the Doctor about her meds. States she will leave this hospital and contact her . Cussing constantly. Will continue to monitor.  "

## 2019-01-26 NOTE — NURSING
Monitor room called to say that patient's heart rate is in the 130's . Pt is extremely upset. I let patient know and comforted her to calm down. Stated she is not going to calm down. That we are doing this to her. Pt is writing down names of nurses, PCTs and MDs. Will continue to monitor.

## 2019-01-26 NOTE — CONSULTS
[facility]    CC: abdominal pain    HPI 53 y.o. female is admitted for 1/4 GNR bacteremia who presents with acute on chronic, intermittent, severe, stabbing right pelvic abdominal pain associated with diarrhea and weight loss. She complains of two different pains; she does have gastric reflux for which she takes a PPI and prn Zantac. She states she can deal with her epigastric pain. However, she states her right lower abdominal pain is very severe. Her daughter states she usually has diarrhea when she has the pain. Last bowel movement was yesterday and was solid stool. She denies pus, blood, mucous in her stools. She denies melena. She had an EGD here in 2017 that was unrevealing. She had a repeat EGD and a colonoscopy 3 months ago that she was told was normal (Dr. Latham). She is a chronic smoker.     I have reviewed previous hospital records. Collateral information obtained from daughter.     Past Medical History  COPD  Tobacco abuse  GERD  Hx of Gastric ulcer  HLD  HTN    Past Surgical History  Denies abdominal pain    Social History  Uses tobacco   Uses THC  Denies alcohol use    Family History  Denies family history of colon or gastric cancer    Review of Systems  General ROS: positive for weight loss, negative for chills, fever  Psychological ROS: negative for hallucination, depression or suicidal ideation  Ophthalmic ROS: negative for blurry vision, photophobia or eye pain  ENT ROS: negative for epistaxis, sore throat or rhinorrhea  Respiratory ROS: positive for cough, no shortness of breath, or wheezing  Cardiovascular ROS: no chest pain or dyspnea on exertion  Gastrointestinal ROS: positive for abdominal pain, positive for diarrhea, no  black/ bloody stools  Genito-Urinary ROS: no dysuria, trouble voiding, or hematuria  Musculoskeletal ROS: negative for gait disturbance or muscular weakness  Neurological ROS: no syncope or seizures; no ataxia  Dermatological ROS: negative for pruritis, rash and  "jaundice    Physical Examination  BP (!) 138/92 (Patient Position: Lying)   Pulse 104   Temp 97.6 °F (36.4 °C) (Oral)   Resp 18   Ht 5' 4" (1.626 m)   Wt 49.2 kg (108 lb 7.5 oz)   LMP  (LMP Unknown)   SpO2 97%   Breastfeeding? No   BMI 18.62 kg/m²   General appearance: alert, appears older than stated age, cooperative, no distress  HENT: Normocephalic, atraumatic, neck symmetrical, no nasal discharge   Eyes: conjunctivae/corneas clear, PERRL, EOM's intact  Lungs: no audible wheeze, symmetric chest rise  Heart: tachycardic, regular rhythm without rub; palpable peripheral pulses  Abdomen: soft, mildly tender to right groin area; bowel sounds normoactive; no organomegaly  Extremities: extremities symmetric; no clubbing, cyanosis, or edema  Integument: Skin color, texture, turgor normal; no rashes; hair distrubution normal  Neurologic: Alert and oriented X 3, moves all extremities appropriately  Psychiatric: tearful and anxious at times, tangential     Labs:  H/H 16.5/51    Plt 307  UA positive for trace amts of blood    Imaging:  CTA abdomen/pelvis: celiac axis and SMA patent; scattered colonic diverticular, no inflammation, no focal organized pelvic fluid collection, prominent venous  Vasculature within the adenexa    I have personally reviewed these images    Assessment:   53 y.o. female is admitted for 1/4 GNR bacteremia who presents with acute on chronic, intermittent, severe, stabbing right pelvic abdominal pain associated with diarrhea and weight loss. Although calcifications are present, vessels are patent on CTA. She has had a recent EGD and colonoscopy that were unrevealing per patient and family.     Plan:  - Please obtain recent EGD/colonoscopy records  - Recommend Bentyl 20 mg QID for abdominal pain  - Agree with stool studies  - Agree with antibiotics per ID recommendations     Discussed with Dr. Meneses. Please call with any questions or concerns.     Terri ARGUELLOU " Gastroenterology  Cell [phone]

## 2019-01-27 LAB — WBC #/AREA STL HPF: NORMAL /[HPF]

## 2019-01-27 PROCEDURE — G0378 HOSPITAL OBSERVATION PER HR: HCPCS

## 2019-01-27 PROCEDURE — 25000003 PHARM REV CODE 250: Performed by: STUDENT IN AN ORGANIZED HEALTH CARE EDUCATION/TRAINING PROGRAM

## 2019-01-27 PROCEDURE — 87209 SMEAR COMPLEX STAIN: CPT

## 2019-01-27 PROCEDURE — 63600175 PHARM REV CODE 636 W HCPCS: Performed by: STUDENT IN AN ORGANIZED HEALTH CARE EDUCATION/TRAINING PROGRAM

## 2019-01-27 PROCEDURE — 25000003 PHARM REV CODE 250: Performed by: INTERNAL MEDICINE

## 2019-01-27 PROCEDURE — 25000003 PHARM REV CODE 250: Performed by: PSYCHIATRY & NEUROLOGY

## 2019-01-27 PROCEDURE — 94761 N-INVAS EAR/PLS OXIMETRY MLT: CPT

## 2019-01-27 PROCEDURE — 87045 FECES CULTURE AEROBIC BACT: CPT

## 2019-01-27 PROCEDURE — 87493 C DIFF AMPLIFIED PROBE: CPT

## 2019-01-27 PROCEDURE — 87046 STOOL CULTR AEROBIC BACT EA: CPT | Mod: 59

## 2019-01-27 PROCEDURE — 87449 NOS EACH ORGANISM AG IA: CPT

## 2019-01-27 PROCEDURE — 89055 LEUKOCYTE ASSESSMENT FECAL: CPT

## 2019-01-27 PROCEDURE — 87427 SHIGA-LIKE TOXIN AG IA: CPT

## 2019-01-27 PROCEDURE — S0030 INJECTION, METRONIDAZOLE: HCPCS | Performed by: STUDENT IN AN ORGANIZED HEALTH CARE EDUCATION/TRAINING PROGRAM

## 2019-01-27 RX ORDER — ONDANSETRON 8 MG/1
8 TABLET, ORALLY DISINTEGRATING ORAL EVERY 6 HOURS PRN
Status: DISCONTINUED | OUTPATIENT
Start: 2019-01-27 | End: 2019-01-27

## 2019-01-27 RX ORDER — QUETIAPINE FUMARATE 25 MG/1
50 TABLET, FILM COATED ORAL NIGHTLY
Status: DISCONTINUED | OUTPATIENT
Start: 2019-01-27 | End: 2019-01-28 | Stop reason: HOSPADM

## 2019-01-27 RX ORDER — ONDANSETRON 4 MG/1
4 TABLET, ORALLY DISINTEGRATING ORAL EVERY 6 HOURS PRN
Status: DISCONTINUED | OUTPATIENT
Start: 2019-01-27 | End: 2019-01-28 | Stop reason: HOSPADM

## 2019-01-27 RX ORDER — PROMETHAZINE HYDROCHLORIDE 25 MG/ML
6.25 INJECTION, SOLUTION INTRAMUSCULAR; INTRAVENOUS ONCE
Status: DISCONTINUED | OUTPATIENT
Start: 2019-01-27 | End: 2019-01-27

## 2019-01-27 RX ORDER — PROMETHAZINE HYDROCHLORIDE 12.5 MG/1
12.5 TABLET ORAL EVERY 6 HOURS PRN
Status: DISCONTINUED | OUTPATIENT
Start: 2019-01-27 | End: 2019-01-27

## 2019-01-27 RX ORDER — BUPRENORPHINE 2 MG/1
4 TABLET SUBLINGUAL 3 TIMES DAILY
Status: DISCONTINUED | OUTPATIENT
Start: 2019-01-27 | End: 2019-01-28 | Stop reason: HOSPADM

## 2019-01-27 RX ADMIN — LIDOCAINE HYDROCHLORIDE: 20 SOLUTION ORAL; TOPICAL at 05:01

## 2019-01-27 RX ADMIN — PROMETHAZINE HYDROCHLORIDE 6.25 MG: 25 INJECTION INTRAMUSCULAR; INTRAVENOUS at 04:01

## 2019-01-27 RX ADMIN — DICYCLOMINE HYDROCHLORIDE 20 MG: 10 CAPSULE ORAL at 06:01

## 2019-01-27 RX ADMIN — LISINOPRIL 20 MG: 20 TABLET ORAL at 10:01

## 2019-01-27 RX ADMIN — ONDANSETRON 4 MG: 4 TABLET, ORALLY DISINTEGRATING ORAL at 08:01

## 2019-01-27 RX ADMIN — METRONIDAZOLE 500 MG: 500 INJECTION, SOLUTION INTRAVENOUS at 10:01

## 2019-01-27 RX ADMIN — BUPRENORPHINE HCL 4 MG: 2 TABLET SUBLINGUAL at 02:01

## 2019-01-27 RX ADMIN — BUPRENORPHINE HCL 4 MG: 2 TABLET SUBLINGUAL at 09:01

## 2019-01-27 RX ADMIN — GABAPENTIN 600 MG: 600 TABLET, FILM COATED ORAL at 10:01

## 2019-01-27 RX ADMIN — ONDANSETRON 8 MG: 8 TABLET, ORALLY DISINTEGRATING ORAL at 05:01

## 2019-01-27 RX ADMIN — GABAPENTIN 600 MG: 600 TABLET, FILM COATED ORAL at 09:01

## 2019-01-27 RX ADMIN — DICYCLOMINE HYDROCHLORIDE 20 MG: 10 CAPSULE ORAL at 09:01

## 2019-01-27 RX ADMIN — DICYCLOMINE HYDROCHLORIDE 20 MG: 10 CAPSULE ORAL at 10:01

## 2019-01-27 RX ADMIN — METRONIDAZOLE 500 MG: 500 INJECTION, SOLUTION INTRAVENOUS at 06:01

## 2019-01-27 RX ADMIN — ONDANSETRON 4 MG: 4 TABLET, ORALLY DISINTEGRATING ORAL at 11:01

## 2019-01-27 RX ADMIN — ONDANSETRON 4 MG: 2 INJECTION INTRAMUSCULAR; INTRAVENOUS at 12:01

## 2019-01-27 RX ADMIN — DICYCLOMINE HYDROCHLORIDE 20 MG: 10 CAPSULE ORAL at 01:01

## 2019-01-27 RX ADMIN — METRONIDAZOLE 500 MG: 500 INJECTION, SOLUTION INTRAVENOUS at 12:01

## 2019-01-27 RX ADMIN — QUETIAPINE FUMARATE 50 MG: 25 TABLET, FILM COATED ORAL at 09:01

## 2019-01-27 RX ADMIN — GABAPENTIN 600 MG: 600 TABLET, FILM COATED ORAL at 02:01

## 2019-01-27 NOTE — PLAN OF CARE
"Nurse about to give IV antibiotics and pt is going off to go down to smoke. Pt states, " I'll get it after I smoke." Pt resist to stop her from smoking. Inform Dr. Acevedo and MD is aware.  "

## 2019-01-27 NOTE — NURSING
"Spoke to the Dr. Herrera treatment team about pain management.  Pt is walking on hallway, expressing anger, and is verbally abusive to treatment care team including doctors and nurses; MD stated they will not going to order any narcotic pain medication during night unless tylenol and toradol.  Pt stated "I rather leave if I am not getting pain control."  MD visualized pt and talked about AMA.    "

## 2019-01-27 NOTE — PLAN OF CARE
"Pt has a big bottle of tylenol at the bedside. Nurse came to reassess her nausea after giving the ODT zofran and pt reported it didn't work in a agitated manner. Pt verbalized that nobody cares for her suffering in pain and she will take tylenol as much as she can. Nurse educate pt about taking tylenol and pt states, "I dont care because nobody cares." She also states that she will walk down stairs to smoke. Informed Dr. Cm in the nursing station and informed Dr. Edgar Flores is aware. Will continue to monitor.  "

## 2019-01-27 NOTE — NURSING
"Informed the treatment team MD that pt is in the shower and pt stated "I rather be in the shower to get heat on my belly because nobody is taking care of my pain."  Pt also stated she would like to talk with MD now, MD stated he will visualize pt soon.  "

## 2019-01-27 NOTE — NURSING
"Pt received toradol IV at 2127, pt stated "tylenol and toradol not going to do anything for this pain, I can take tylenol on my own at home, I am hurting, I need this pain to be controlled."  Pt is cursing and walking around the hallway, actively packing her stuff in her room, informed MD about the pt stated she wish to leave.  MD stated okay to get signed on AMA form if pt is not going to wait till MD comes up to her room.  "

## 2019-01-27 NOTE — NURSING
"Informed Dr. Altamirano that pt is refusing all cares and requesting to take pain medication for her right groin to abdominal area pain rating as 10 out of 10, pt stated "I wouldn't lie about narcotic use, all doctors are lying, nobody is taking care of my pain, and I rather be leaving if this pain is not going to be controlled."  Notified MD that pt is actively attempting to leave at current time.  MD stated he will visualize pt after reviewing pt's chart.  "

## 2019-01-27 NOTE — NURSING
Informed charge nurse that pt left the room, suspecting using smoking downstairs when pt left the room.

## 2019-01-27 NOTE — PLAN OF CARE
Informed Dr. Acevedo that pt is still nauseated after the zofran given early this morning. MD will put new order.

## 2019-01-27 NOTE — PROGRESS NOTES
[facility] Resident [facility] Progress Note    Subjective:      Pt extremely agitated and perseverating on abdominal/suprapubic pain. She is very angry and does not feel she is getting appropriate pain control. Overnight she was going to leave AMA but decided to stay. ID consulted, will continue Metronidazole. Psych is going to start her on suboxone today. Vitals stable.     Objective:   Last 24 Hour Vital Signs:  BP  Min: 138/92  Max: 172/95  Temp  Av.6 °F (36.4 °C)  Min: 97.5 °F (36.4 °C)  Max: 97.6 °F (36.4 °C)  Pulse  Av.7  Min: 74  Max: 104  Resp  Av  Min: 16  Max: 18  SpO2  Av %  Min: 95 %  Max: 97 %  I/O last 3 completed shifts:  In: 1020 [P.O.:1020]  Out: 2400 [Urine:2400]    Physical Examination:  General appearance: Awake, alert, oriented to person, place, time, and situation, angry, agitated, poking at belly while yelling  Head: Normocephalic, atraumatic  Eyes: EOMI, PERRL  Nose: Boggy mucosal membranes noted, No drainage, no purulence  Throat: Mild erythema noted in posterior oropharynx, No exudate, Poor dentition  Neck: JVP WNL, Neck Supple Trachea Midline  Back: Non-tender, Normal Curvature  Lungs: CTAB, No wheezes, Crackles, Or rhonchi appreciated  Heart: RRR, No murmurs, gallups, or rubs appreciated  Abdomen: Soft, non-distended, + BS, No rebound, Wade's negative, mild tenderness noted over RLQ in the area of right inguinal crease, redness secondary to pt applying hot water to skin  Extremities: Atraumatic, No edema present  Pulses: 2+  Skin: No rashes or skin changes noted, Normal skin turgor, no bruising   Neurologic: Moving all 4 extremities equally, normal gate       Laboratory:  Laboratory Data Reviewed:   Pertinent Findings:  Recent Labs     19  0924 19  0410   WBC 11.68 8.93   HGB 18.0* 16.5*   HCT 54.0* 51.2*    307   MCV 98 100*   RDW 14.3 13.7   GRAN 56.0  6.5 31.8*  2.8   LYMPH 34.2  4.0 57.0*  5.1*   MONO 8.9  1.0 9.7  0.9   EOS 0.1 0.1   BASO 0.02  0.01   EOSINOPHIL 0.7 1.2   BASOPHIL 0.2 0.1       Recent Labs     01/25/19  0924 01/26/19  0410   * 138   K 4.1 4.0   CL 96 100   CO2 25 29   BUN 18 14   CREATININE 1.1 0.9    88     No results for input(s): POCTGLUCOSE in the last 72 hours.  Recent Labs     01/25/19  0924 01/26/19  0410   PROT 8.4 7.1   ALBUMIN 4.7 4.0   BILITOT 0.4 0.5   AST 39 25   ALT 42 30   ALKPHOS 97 82         Microbiology Data Reviewed:   Pertinent Findings:        Radiology Data Reviewed:   Pertinent Findings:  Imaging Results          CT Abdomen Pelvis With Contrast (Final result)  Result time 01/25/19 15:27:15    Final result by Romario Ortiz MD (01/25/19 15:27:15)                 Impression:      1. Prominent venous vasculature within the adnexa, correlation with any history of pelvic congestion syndrome recommended.  2. Distention of the stomach with ingested liquid, without wall thickening.  3. Moderate stool in the colon.  4. Several additional findings above.      Electronically signed by: Romario Ortiz MD  Date:    01/25/2019  Time:    15:27             Narrative:    EXAMINATION:  CT ABDOMEN PELVIS WITH CONTRAST    CLINICAL HISTORY:  Abd pain, fever, abscess suspected; Generalized abdominal pain    TECHNIQUE:  Low dose axial images, sagittal and coronal reformations were obtained from the lung bases to the pubic symphysis following the IV administration of 75 mL of Omnipaque 350 .  Oral contrast was not given.    COMPARISON:  01/15/2019    FINDINGS:  Images of the lower thorax are remarkable for scattered emphysematous change.    The liver, spleen, pancreas, gallbladder and adrenal glands are grossly unremarkable.  There is no biliary dilation or ascites.  The portal vein, splenic vein, SMV, celiac axis and SMA all are patent.  The stomach is distended with ingested liquid.    The kidneys enhance symmetrically without hydronephrosis or nephrolithiasis.  Punctate hypoattenuating lesions are noted within the  kidneys bilaterally, too small for characterization.  The bilateral ureters are unable to be followed in their entirety to the urinary bladder, no definite calculi along their visualized courses, and no secondary findings to suggest obstructive uropathy.  The urinary bladder is decompressed, limiting evaluation.  The uterus and adnexa is remarkable for prominent venous vasculature in the adnexa bilaterally.  Correlation with any history of pelvic congestion syndrome.    There are several scattered colonic diverticula, no definite surrounding inflammation allowing for motion artifact.  The appendix and terminal ileum are grossly unremarkable.  The small bowel is grossly unremarkable.  No focal organized pelvic fluid collection.  There is atherosclerotic calcification of the aorta and its branches.    Degenerative changes are noted of the spine.  Postsurgical changes are noted of the spine.  Spinal fusion hardware spans L5-S1 noting grade 1-2 anterolisthesis of L5 in relationship to S1.  This appears stable as compared to the previous exam.  No significant inguinal lymphadenopathy.                                  Current Medications:     Infusions:       Scheduled:   dicyclomine  20 mg Oral QID    fluticasone-vilanterol  1 puff Inhalation Daily    gabapentin  600 mg Oral TID    heparin (porcine)  5,000 Units Subcutaneous Q8H    lisinopril  20 mg Oral Daily    metronidazole  500 mg Intravenous Q8H    nicotine  1 patch Transdermal Daily    tiotropium  1 capsule Inhalation Daily        PRN:  acetaminophen, albuterol, hyoscyamine, ondansetron, pneumoc 13-rakel conj-dip cr(PF), sodium chloride 0.9%    Antibiotics and Day Number of Therapy:  None    Lines and Day Number of Therapy:  PIV Antecubital, metronidazole (started 1/25)    Assessment:     Amaya Hall is a 53 y.o.female with  Patient Active Problem List    Diagnosis Date Noted    Agitation 01/26/2019    Hypercalcemia 01/26/2019    Gram-negative  bacteremia 01/25/2019    Wheezing     Abdominal pain 03/30/2017    Gastritis 03/30/2017    Group G streptococcal infection 12/30/2016    Anemia 12/20/2016    Pulmonary emphysema 12/19/2016    Depression 12/19/2016    Anemia of chronic disease 12/18/2016    Pneumonia of right upper lobe due to infectious organism 12/18/2016    Chest pain 12/17/2016    Lesion of right lung 12/17/2016    CKD (chronic kidney disease) stage 3, GFR 30-59 ml/min 12/17/2016    GERD (gastroesophageal reflux disease) 12/17/2016    Depressed 12/17/2016    Tobacco abuse 12/17/2016    Essential hypertension 12/17/2016    H/O peptic ulcer 12/17/2016    Weight loss 12/17/2016    Sepsis 12/17/2016    Consolidation of right upper lobe 12/17/2016    Coffee ground emesis     Epigastric pain 01/23/2014    Hypokalemia 07/26/2013        Plan:     Bacteroides Ovatus Bacteremia on 1/15/19  - 1/4 bottles positive.   - Noted on previous blood culture from 1/15/2019.   - A febrile. VSS. WBC WNL   - Suffers from chronic abdominal pain of unknown etiology   - Repeat CT abdomen results as above   - Will continue metronidazole. Repeat Blood cultures pending     Abdominal Pain   -RLQ  -Unclear etiology at this time  - CT abdomen and pelvis shows increased vascularity/ Vascular congestion in Pelvis  -Consider OBGYN consult.   -pt with recent colonoscopy and EGD, will attempt to get records  -as per pt, pain severe and not responding to tylenol, gi cocktail, bentyl, gabapentin, ketorolac, hyosyamine; only was relieved with percocet  -pt with utox positive for amphetamines, will not give opioids for pain  -consulted Psych, will start suboxone therapy today    Bipolar Disorder with Maria Isabel  - consulted Psych as above, will consider starting mood stabilizers  - starting suboxone as above for use disorder    Benign essential HTN   -Continue Lisinopril 20 mg daily     Seizure disorder    -Reports that last seizure was several weeks ago  -Currently  Asymptomatic   -Continue home Gabapentin 600 mg TID    HLD  Continue home Atorvastatin 80 mg daily     COPD   -Continue Spiriva and PRN HFA inhaler    Pulmonary Nodule/Scarring   -Patient reports that she follows with Dr. Holley   -Repeat CT scan on 12/11/2018 showed  Severe emphysema. Scarring right apical region, previously seen area of cavitation is no longer seen.  Follow-up in 12 months advised to ensure stability.      Adelaide Acevedo  \[facility]\"" Internal Medicine HO-I  \[facility]\"" IM Service Team B    \[facility]\"" Medicine Hospitalist Pager numbers:   \[facility]\"" Hospitalist Medicine Team A (Diamond/Francine): [phone]  \[facility]\"" Hospitalist Medicine Team B (Chastity/Jeff):  [phone]

## 2019-01-27 NOTE — NURSING
Spoke to Dr. Herrera team, about discontinuing cardiac monitor due to short supply at current time; MD stated okay to discontinue.

## 2019-01-27 NOTE — PROGRESS NOTES
"[facility]    CC: abdominal pain    HPI 53 y.o. female is admitted for 1/4 GNR bacteremia who presents with acute on chronic, intermittent, severe, stabbing right pelvic abdominal pain associated with diarrhea and weight loss. Although calcifications are present, vessels are patent on CTA. She has had a recent EGD and colonoscopy that were unrevealing per patient and family.     Interval: Complains that her pain is not controlled although heating packs "take the edge off". She states she is nauseous and vomited her oral medications. She is afebrile.       Past Medical History  COPD  Tobacco abuse  GERD  Hx of Gastric ulcer  HLD  HTN    Review of Systems  General ROS: positive for chills, positive for weight loss, positive for fatigue, no fever  Cardiovascular ROS: no chest pain or dyspnea on exertion  Gastrointestinal ROS: positive for abdominal pain, no black/ bloody stools    Physical Examination  /64 (BP Location: Right arm, Patient Position: Lying)   Pulse 105   Temp 97.6 °F (36.4 °C) (Oral)   Resp 18   Ht 5' 4" (1.626 m)   Wt 49.2 kg (108 lb 7.5 oz)   LMP  (LMP Unknown)   SpO2 96%   Breastfeeding? No   BMI 18.62 kg/m²   General appearance: alert, mild distress, appears older than stated age  HENT: Normocephalic, atraumatic, neck symmetrical, no nasal discharge   Lungs: no audible wheeze, no dullness to percussion bilaterally  Heart: regular rate and rhythm without rub; no displacement of the PMI   Abdomen: soft, tenderness to palpation to right inguinal area; bowel sounds normoactive; no organomegaly  Extremities: extremities symmetric; no edema  Neurologic: Alert and oriented X 3, moves all extremities appropriately     Labs:  Refused AM labs  Cr 0.9  H/H 16.5/51    Imaging:  No new imaging    Assessment:   53 y.o. female is admitted for 1/4 GNR bacteremia who presents with acute on chronic, intermittent, severe, stabbing right pelvic abdominal pain associated with diarrhea and weight " loss. Although calcifications are present, vessels are patent on CTA. She has had a recent EGD and colonoscopy that were unrevealing per patient and family.     Plan:  - Continue Bentyl 20 mg QID. Continue heating packs as needed. Agree with Gabapentin. Patient is unable to tolerate TCA or SSRI 2/2 previous SI in the past  - Please obtain outside EGD and colonoscopy reports  - Agree with antibiotic regimen as per ID  - Recommend GYN evaluation as outpatient    Will discuss with Dr. Meneses with assessment/plan as above with any addenda or clarifications to follow as needed.     Please call with any questions or concerns.    Terri Russo  [facility]  Cell [phone]

## 2019-01-27 NOTE — PROGRESS NOTES
[facility]  Neuroendocrine Tumor Service  Progress Note     Admission Date: 1/25/2019  Hospital Length of Stay: 0  Principal Problem: <principal problem not specified>     Subjective:      Interval History:   Agitated overnight  Refused most interventions  Pain still issue  C/o nausea    Scheduled Meds:   dicyclomine  20 mg Oral QID    fluticasone-vilanterol  1 puff Inhalation Daily    gabapentin  600 mg Oral TID    heparin (porcine)  5,000 Units Subcutaneous Q8H    lisinopril  20 mg Oral Daily    metronidazole  500 mg Intravenous Q8H    nicotine  1 patch Transdermal Daily    tiotropium  1 capsule Inhalation Daily     Continuous Infusions:  PRN Meds:.acetaminophen, albuterol, hyoscyamine, ondansetron, pneumoc 13-rakel conj-dip cr(PF), sodium chloride 0.9%    Objective:      Temp:  [97.5 °F (36.4 °C)-97.6 °F (36.4 °C)] 97.5 °F (36.4 °C)  Pulse:  [] 104  Resp:  [16-18] 16  SpO2:  [95 %-97 %] 97 %  BP: (138-172)/() 167/87  Weight change:     Intake/Output Summary (Last 24 hours) at 1/27/2019 0817  Last data filed at 1/27/2019 0015  Gross per 24 hour   Intake 645 ml   Output 1400 ml   Net -755 ml       Physical Exam:  GEN: awake, alert, agitated  CV: RRR  PULM: CTAB  ABD: S/ TTP in suprapubic area/ND  EXT: Peripheral pulses present and equal bilaterally    No results for input(s): NA, K, CO2, CL, BUN, CREATININE, GLUCOSE, CALCIUM, PROT, AST, ALT, ALKPHOS, BILITOT in the last 24 hours.    Invalid input(s):  ALBUMIN  No results for input(s): WBC, HGB, HCT, PLT in the last 24 hours.    Significant Imaging: I have reviewed all pertinent imaging results/findings within the past 24 hours.     Assessment and Plan:      53F Vasculopath c Wt Loss, Heavy smoking hx, appears cachectic      Recommend CTAngio A/P c attn to mesenteric vasculature for r/o mesenteric ischemia  Recommend CT chest non con  Colonoscopy, for poss ischemic colon, DONNIE looks gone - SMA/Celiac cheavy  calcification at origin  No acute surgical intervention - will sign off, please call us back if any further questions    Ean Díaz MD

## 2019-01-27 NOTE — NURSING
Pt is still angry and asking for toradol order that MD stated earlier.  Asked Dr. Altamirano about the toradol order for pt's pain, MD stated he will put the order in.

## 2019-01-27 NOTE — PLAN OF CARE
Problem: Adult Inpatient Plan of Care  Goal: Plan of Care Review  Plan of care reviewed with patient.Patient agitated states no one is addressing her pain.[facility] team notified [facility] team spoke with patient in room. NSR on monitor with no red alarms noted. Side rails up x3, bed low, bed alarm on, call bell within reach. Will continue to monitor.

## 2019-01-27 NOTE — PLAN OF CARE
Pt told the nurse that she wants another dose of gabapentin and suboxone that she threw up or else she wants to go AMA. Informed Dr. Acevedo and MD states cannot give another dose of medication. MD will see and talk to pt. Will continue to monitor.

## 2019-01-27 NOTE — PLAN OF CARE
Problem: Adult Inpatient Plan of Care  Goal: Plan of Care Review  Outcome: Ongoing (interventions implemented as appropriate)   01/27/19 0600   Plan of Care Review   Plan of Care Reviewed With patient   POC reviewed with the pt, pt is agitated during night time due to pain; MD aware.  Complaint of R groin area, nausea, abdominal pain; one time dose toradol, gi cocktail, and prn zofran given as needed.  IV flagyl given.  Special contact isolation maintained, no BM during night.  Pt refused cares including meds, VS, lab.  Refusing safety measures, free of falls throughout shift.

## 2019-01-27 NOTE — PLAN OF CARE
Nurse gave the scheduled gabapentin and buprenorphine. Pt suddenly threw up after taking the medications while nurse is at the bedside. Informed Dr. Holbrook will put a new order. Will continue to monitor

## 2019-01-27 NOTE — PROGRESS NOTES
[facility] Progress Note     Primary Team: Medicine  Consultant Attending: Panda  Date of Admit: 2019    Summary of history     Anerobic bacteremia in a patient with undiagnosed chronic abdominal pain     Interval events     None     Subjective     Had animated discussion with patient about pain medication and her bacteremia. Patient also complained of vomiting    Current Medications:     Infusions:       Scheduled:   dicyclomine  20 mg Oral QID    fluticasone-vilanterol  1 puff Inhalation Daily    gabapentin  600 mg Oral TID    heparin (porcine)  5,000 Units Subcutaneous Q8H    lisinopril  20 mg Oral Daily    metronidazole  500 mg Intravenous Q8H    nicotine  1 patch Transdermal Daily    tiotropium  1 capsule Inhalation Daily        PRN:  acetaminophen, albuterol, hyoscyamine, ondansetron, pneumoc 13-rakel conj-dip cr(PF), sodium chloride 0.9%    Antibiotics and Day Number of Therapy:  Metronidazole     Objective   Last 24 Hour Vital Signs:  BP  Min: 138/92  Max: 172/95  Temp  Av.6 °F (36.4 °C)  Min: 97.5 °F (36.4 °C)  Max: 97.6 °F (36.4 °C)  Pulse  Av.7  Min: 74  Max: 104  Resp  Av  Min: 16  Max: 18  SpO2  Av %  Min: 95 %  Max: 97 %    Lines and Day Number of Therapy:  None    Physical Examination:  Examination  Deferred, patient agitated and complained about multiple people examining her without addressing her pain   She appeared otherwise well    Lab data:  CBC:   Lab Results   Component Value Date    WBC 8.93 2019    HGB 16.5 (H) 2019     2019     (H) 2019    RDW 13.7 2019     Estimated Creatinine Clearance: 54.9 mL/min (based on SCr of 0.9 mg/dL).    Microbiology Data  Bacteroides in 1/4 bottles     Assessment     Amaya Hall is a 53 y.o.female with past medical history as above who has chronic abdominal pain which has been worked up and now has bacteroides bacteremia in 1/4 bottles. This organism points to an  intraabdominal pathology as yet not elucidated.     In terms of antibacterial choice, would not cover for other classic non-anerobic GI organisms since these often grow out more easily than anerobes hence they were probably not present in blood at the time of that culture.       Recommendations     Anerobic bacteremia   - continue metronidazole IV for now since patient has vomiting, but may be able to discharge on po metronidazole if tolerating well orally  - anticipate 2 weeks of antibiotic therapy from first negative blood culture (1/25/19)  - await either reviewing of old colonscopy records or repeat colonoscopy    Thank you for this consult. We will follow.    Santiago Castillo  [facility] ID Fellow

## 2019-01-28 ENCOUNTER — CLINICAL SUPPORT (OUTPATIENT)
Dept: SMOKING CESSATION | Facility: CLINIC | Age: 54
End: 2019-01-28
Payer: COMMERCIAL

## 2019-01-28 VITALS
WEIGHT: 108.44 LBS | SYSTOLIC BLOOD PRESSURE: 104 MMHG | OXYGEN SATURATION: 95 % | HEIGHT: 64 IN | TEMPERATURE: 98 F | DIASTOLIC BLOOD PRESSURE: 60 MMHG | HEART RATE: 81 BPM | RESPIRATION RATE: 22 BRPM | BODY MASS INDEX: 18.51 KG/M2

## 2019-01-28 DIAGNOSIS — F17.210 CIGARETTE SMOKER: Primary | ICD-10-CM

## 2019-01-28 LAB
ALBUMIN SERPL BCP-MCNC: 4.3 G/DL
ALP SERPL-CCNC: 81 U/L
ALT SERPL W/O P-5'-P-CCNC: 29 U/L
ANION GAP SERPL CALC-SCNC: 12 MMOL/L
APAP SERPL-MCNC: <3 UG/ML
AST SERPL-CCNC: 33 U/L
BASOPHILS # BLD AUTO: 0.01 K/UL
BASOPHILS NFR BLD: 0.1 %
BILIRUB SERPL-MCNC: 0.6 MG/DL
BUN SERPL-MCNC: 16 MG/DL
CALCIUM SERPL-MCNC: 9.7 MG/DL
CHLORIDE SERPL-SCNC: 99 MMOL/L
CO2 SERPL-SCNC: 26 MMOL/L
CREAT SERPL-MCNC: 1.1 MG/DL
DIFFERENTIAL METHOD: ABNORMAL
EOSINOPHIL # BLD AUTO: 0.1 K/UL
EOSINOPHIL NFR BLD: 0.7 %
ERYTHROCYTE [DISTWIDTH] IN BLOOD BY AUTOMATED COUNT: 13.5 %
EST. GFR  (AFRICAN AMERICAN): >60 ML/MIN/1.73 M^2
EST. GFR  (NON AFRICAN AMERICAN): 57 ML/MIN/1.73 M^2
GLUCOSE SERPL-MCNC: 90 MG/DL
HCT VFR BLD AUTO: 47.9 %
HGB BLD-MCNC: 15.8 G/DL
LYMPHOCYTES # BLD AUTO: 3.8 K/UL
LYMPHOCYTES NFR BLD: 31.9 %
MCH RBC QN AUTO: 32.7 PG
MCHC RBC AUTO-ENTMCNC: 33 G/DL
MCV RBC AUTO: 99 FL
MONOCYTES # BLD AUTO: 1.1 K/UL
MONOCYTES NFR BLD: 9.4 %
NEUTROPHILS # BLD AUTO: 6.9 K/UL
NEUTROPHILS NFR BLD: 57.6 %
O+P STL TRI STN: NORMAL
PLATELET # BLD AUTO: 271 K/UL
PMV BLD AUTO: 9 FL
POTASSIUM SERPL-SCNC: 3.7 MMOL/L
PROT SERPL-MCNC: 7.5 G/DL
PTH-INTACT SERPL-MCNC: 102.8 PG/ML
RBC # BLD AUTO: 4.83 M/UL
SODIUM SERPL-SCNC: 137 MMOL/L
WBC # BLD AUTO: 11.96 K/UL

## 2019-01-28 PROCEDURE — 25000003 PHARM REV CODE 250: Performed by: STUDENT IN AN ORGANIZED HEALTH CARE EDUCATION/TRAINING PROGRAM

## 2019-01-28 PROCEDURE — 25000003 PHARM REV CODE 250: Performed by: INTERNAL MEDICINE

## 2019-01-28 PROCEDURE — 36415 COLL VENOUS BLD VENIPUNCTURE: CPT

## 2019-01-28 PROCEDURE — G0378 HOSPITAL OBSERVATION PER HR: HCPCS

## 2019-01-28 PROCEDURE — 83970 ASSAY OF PARATHORMONE: CPT

## 2019-01-28 PROCEDURE — 25000242 PHARM REV CODE 250 ALT 637 W/ HCPCS: Performed by: STUDENT IN AN ORGANIZED HEALTH CARE EDUCATION/TRAINING PROGRAM

## 2019-01-28 PROCEDURE — S0030 INJECTION, METRONIDAZOLE: HCPCS | Performed by: STUDENT IN AN ORGANIZED HEALTH CARE EDUCATION/TRAINING PROGRAM

## 2019-01-28 PROCEDURE — 99407 PR TOBACCO USE CESSATION INTENSIVE >10 MINUTES: ICD-10-PCS | Mod: S$GLB,,,

## 2019-01-28 PROCEDURE — 99407 BEHAV CHNG SMOKING > 10 MIN: CPT | Mod: S$GLB,,,

## 2019-01-28 PROCEDURE — 80053 COMPREHEN METABOLIC PANEL: CPT

## 2019-01-28 PROCEDURE — 25000003 PHARM REV CODE 250: Performed by: PSYCHIATRY & NEUROLOGY

## 2019-01-28 PROCEDURE — 82397 CHEMILUMINESCENT ASSAY: CPT

## 2019-01-28 PROCEDURE — 94640 AIRWAY INHALATION TREATMENT: CPT

## 2019-01-28 PROCEDURE — 80329 ANALGESICS NON-OPIOID 1 OR 2: CPT

## 2019-01-28 PROCEDURE — 85025 COMPLETE CBC W/AUTO DIFF WBC: CPT

## 2019-01-28 RX ORDER — QUETIAPINE FUMARATE 50 MG/1
50 TABLET, FILM COATED ORAL NIGHTLY
Qty: 30 TABLET | Refills: 11 | Status: SHIPPED | OUTPATIENT
Start: 2019-01-28 | End: 2020-01-28

## 2019-01-28 RX ORDER — METRONIDAZOLE 500 MG/1
500 TABLET ORAL EVERY 8 HOURS
Qty: 30 TABLET | Refills: 0 | Status: SHIPPED | OUTPATIENT
Start: 2019-01-28 | End: 2019-02-07

## 2019-01-28 RX ADMIN — TIOTROPIUM BROMIDE 18 MCG: 18 CAPSULE ORAL; RESPIRATORY (INHALATION) at 08:01

## 2019-01-28 RX ADMIN — LISINOPRIL 20 MG: 20 TABLET ORAL at 09:01

## 2019-01-28 RX ADMIN — ONDANSETRON 4 MG: 4 TABLET, ORALLY DISINTEGRATING ORAL at 05:01

## 2019-01-28 RX ADMIN — FLUTICASONE FUROATE AND VILANTEROL TRIFENATATE 1 PUFF: 100; 25 POWDER RESPIRATORY (INHALATION) at 08:01

## 2019-01-28 RX ADMIN — DICYCLOMINE HYDROCHLORIDE 20 MG: 10 CAPSULE ORAL at 09:01

## 2019-01-28 RX ADMIN — METRONIDAZOLE 500 MG: 500 INJECTION, SOLUTION INTRAVENOUS at 02:01

## 2019-01-28 RX ADMIN — GABAPENTIN 600 MG: 600 TABLET, FILM COATED ORAL at 09:01

## 2019-01-28 RX ADMIN — BUPRENORPHINE HCL 4 MG: 2 TABLET SUBLINGUAL at 09:01

## 2019-01-28 NOTE — DISCHARGE SUMMARY
[facility] Hospital Medicine Discharge Summary    Primary Team: [facility] Hospitalist Team B  Attending Physician: Chastity  Resident: Oz Quiñones  Intern: Curtis Rahman    Date of Admit: 1/25/2019  Date of Discharge: 1/28/2019    Discharge to: Home  Condition: Stable    Discharge Diagnoses     Patient Active Problem List   Diagnosis    Hypokalemia    Epigastric pain    Coffee ground emesis    Chest pain    Lesion of right lung    CKD (chronic kidney disease) stage 3, GFR 30-59 ml/min    GERD (gastroesophageal reflux disease)    Depressed    Tobacco abuse    Essential hypertension    H/O peptic ulcer    Weight loss    Sepsis    Consolidation of right upper lobe    Anemia of chronic disease    Pneumonia of right upper lobe due to infectious organism    Pulmonary emphysema    Depression    Anemia    Group G streptococcal infection    Abdominal pain    Gastritis    Wheezing    Gram-negative bacteremia    Agitation    Hypercalcemia       Consultants and Procedures     Consultants:  ID (Panda)  GI (Gideon)  General surgery (Anahy)  Psyc (Tulio)    Procedures:   None    Imaging:  CT abdomen/pelvis with contrast    Brief History of Present Illness      Ms. Hall is a 53 year old female with a history of COPD, HTN, HLD, bronchitis, seizures, and panic disorder who presented after 1/4 blood cultures from a recent ED admission grew Bacteroides ovatus.  She complained of years of chronic abdominal pain and diarrhea.  Outpatient EGD/colonoscopy had been unrevealing.  CT abdomen and pelvis showed a distended gallbladder but otherwise no acute intra-abdominal abnormalities at that time.  Infectious disease and GI were consulted, and they recommended repeat CT scan, which helped rule out bowel/mesenteric ischemia.  Patient was agitated throughout her hospital stay, cursing out many staff members, stating that they were not controlling her pain, and frequently leaving her room AMA to go smoke cigarretes.   Psychiatry was consulted, and offered patient mood stabilizers for nico associated with bipolar disorder.  Patient refuses all anti-depressant medications, stating that they make her suicidal.  Psychiatry gave patient prescription for suboxone.  Patient was discharged with GI and psychiatry follow up.    For the full HPI please refer to the History & Physical from this admission.    Physical Examination:  General appearance: Awake, alert, oriented to person, place, time, and situation, angry, agitated, persistently refers to her   Head: Normocephalic, atraumatic  Eyes: EOMI, PERRL  Nose: Boggy mucosal membranes noted, No drainage, no purulence  Throat: Mild erythema noted in posterior oropharynx, No exudate, Poor dentition  Neck: JVP WNL, Neck Supple Trachea Midline  Back: Non-tender, Normal Curvature  Lungs: CTAB, No wheezes, Crackles, Or rhonchi appreciated  Heart: RRR, No murmurs, gallups, or rubs appreciated  Abdomen: Soft, non-distended, + BS, No rebound, Wade's negative, mild tenderness noted over RLQ in the area of right inguinal crease, redness secondary to pt applying hot water to skin  Extremities: Atraumatic, No edema present  Pulses: 2+  Skin: No rashes or skin changes noted, Normal skin turgor, no bruising   Neurologic: Moving all 4 extremities equally, normal gate    Hospital Course By Problem with Pertinent Findings     Bacteroides Ovatus Bacteremia on 1/15/19  - 1/4 bottles positive.   - Noted on previous blood culture from 1/15/2019.   - A febrile. VSS. WBC WNL   - Suffers from chronic abdominal pain of unknown etiology   - Repeat CT abdomen results as above   - Will continue metronidazole for a total of 2 weeks (end date 2/7/19)     Abdominal Pain of unclear etiology  -RLQ  - CT abdomen and pelvis shows increased vascularity/ Vascular congestion in Pelvis  - Made ambulatory referral to OB-Gyn based on CT results  -pt with recent colonoscopy and EGD that was unrevealing (unable to find  records)  -as per pt, pain severe and not responding to tylenol, gi cocktail, bentyl, gabapentin, ketorolac, hyosyamine; only was relieved with percocet  -pt with utox positive for amphetamines, will not give opioids for pain  -consulted Psych, started suboxone upon discharge     Bipolar Disorder with Maria Isabel  - consulted Psych as above, will consider starting mood stabilizers  - starting suboxone as above for use disorder     Benign essential HTN   -Continue Lisinopril 20 mg daily      Seizure disorder    -Reports that last seizure was several weeks ago  -Currently Asymptomatic   -Continue home Gabapentin 600 mg TID     HLD  Continue home Atorvastatin 80 mg daily      COPD   -Continue Spiriva and PRN HFA inhaler     Pulmonary Nodule/Scarring   -Patient reports that she follows with Dr. Holley   -Repeat CT scan on 12/11/2018 showed  Severe emphysema. Scarring right apical region, previously seen area of cavitation is no longer seen.  Follow-up in 12 months advised to ensure stability.        Discharge Medications      Amaya Hall   Home Medication Instructions MERY:[id number]    Printed on:01/28/19 1213   Medication Information                      alum-mag hydroxide-simeth 225-200-25 mg/5 mL Susp  Take as directed on bottle             amitriptyline (ELAVIL) 25 MG tablet  Take one pill by mouth nightly for pain.             atorvastatin (LIPITOR) 80 MG tablet  Take 80 mg by mouth once daily.             budesonide-formoterol 160-4.5 mcg (SYMBICORT) 160-4.5 mcg/actuation HFAA  Inhale 2 puffs into the lungs every 12 (twelve) hours.             dicyclomine (BENTYL) 20 mg tablet  Take 1 tablet (20 mg total) by mouth 4 (four) times daily as needed (gi distress).             duloxetine (CYMBALTA) 30 MG capsule  Take 30 mg by mouth once daily.             esomeprazole magnesium (NEXIUM 24HR) 20 mg TbEC  Take 20 mg by mouth once daily.             gabapentin (NEURONTIN) 600 MG tablet  Take 600 mg by mouth 3 (three)  times daily.             hyoscyamine (LEVSIN/SL) 0.125 mg Subl  Place 1 tablet (0.125 mg total) under the tongue every 8 (eight) hours as needed (abdominal pain).             lisinopril-hydrochlorothiazide (PRINZIDE,ZESTORETIC) 20-25 mg Tab  Take 1 tablet by mouth once daily.             metroNIDAZOLE (FLAGYL) 500 MG tablet  Take 1 tablet (500 mg total) by mouth every 8 (eight) hours. for 10 days             nicotine (NICODERM CQ) 21 mg/24 hr  Place 1 patch onto the skin once daily.             ondansetron (ZOFRAN) 4 MG tablet  Take 1 tablet (4 mg total) by mouth every 8 (eight) hours as needed.             pantoprazole (PROTONIX) 20 MG tablet  Take 20 mg by mouth once daily.             potassium chloride SA (K-DUR,KLOR-CON) 20 MEQ tablet  Take 1 tablet (20 mEq total) by mouth once daily.             QUEtiapine (SEROQUEL) 50 MG tablet  Take 1 tablet (50 mg total) by mouth every evening.             ranitidine (ZANTAC) 150 MG tablet  TK 1 T PO HS             SPIRIVA WITH HANDIHALER 18 mcg inhalation capsule  INHALE 1 C PO QD             sucralfate (CARAFATE) 100 mg/mL suspension  Take 10 mLs (1 g total) by mouth 4 (four) times daily with meals and nightly.             VENTOLIN HFA 90 mcg/actuation inhaler  INL 2 PFS PO Q 4 H PRN                 Discharge Information:   Diet:  Cardiac    Physical Activity:  As tolerated             Instructions:  1. Take all medications as prescribed  2. Keep all follow-up appointments  3. Return to the hospital or call your primary care physicians if any worsening symptoms such as fever, chest pain, shortness of breath, return of symptoms, or any other concerns.    Follow-Up Appointments:  GI  Psychiatry  PCP  OB-Gyn      Burt Rahman MD  [facility] Internal Medicine, HO-1

## 2019-01-28 NOTE — CONSULTS
Discharge pt to home with current meds.  Follow up with [facility] for subutex and seroquel.

## 2019-01-28 NOTE — PLAN OF CARE
Appts made and referral sent to [facility] for GI follow-up.  Pt agrees to walk in [facility] within 48 hours of discharge with ID, insurance card and discharge paperwork.  No other needs identified.       01/28/19 1102   Final Note   Assessment Type Final Discharge Note   Anticipated Discharge Disposition Home   Hospital Follow Up  Appt(s) scheduled? Yes   Discharge plans and expectations educations in teach back method with documentation complete? Yes   Right Care Referral Info   Post Acute Recommendation No Care

## 2019-01-28 NOTE — PROGRESS NOTES
Chart reviewed, the patient examined and case discussed with the staff.  The   staff has reported that the patient is still irritable, angry, sarcastic and   exhibited mood swings.  She is now agitated and paranoid.  The patient believed   that she injected anti-infective to her vein.  She denies hearing voices or   seeing things.  Her mood is angry and with sullen affect.  However, she is more   receptive today.  The patient agreed to start on Suboxone from today.  The   patient has no drug seeking behavior.  She denies Ativan or antidepressants.    The patient states that she does sleep good, she agreed to take something for   sleep.  According to staff, she has not gotten any Percocet or opioid   preparations more than 24 hours.  The patient has no nausea, vomiting, diarrhea.    The patient wanted to go downstairs for smoke break.  The patient does not   look in severe pain.  Her blood pressure today is 141/94 with 108 pulse.  She is   taking her medicine including Flagyl.    ASSESSMENT:  1.  We will initiate Seroquel 50 mg at nighttime for sleep, paranoia, and   bipolar illness.  2.  We will initiate Subutex 4 mg one sublingual 3 times a day.  We will adjust   medicines according to tolerability.  3.  Education about Suboxone provided.  We will avoid all opioid preparation   other than Suboxone.        /nathaly [id number] martin(s)        DMITRIY  dd: 01/27/2019 13:42:52 (CST)  td: 01/28/2019 06:07:54 (CST)  Doc ID   #[id number]  Job ID #[id number]    CC:

## 2019-01-28 NOTE — PROGRESS NOTES
Individual Follow-Up Form    1/28/2019    Quit Date: To be determined    Clinical Status of Patient: Inpatient    Length of Service: 30 minutes    Comments: Smoking cessation education and materials provided.  Pt states that she has tried unsuccessfully to quit smoking in the past, and that she is ready to quit smoking for good at this time.  Pt is enrolled in the Ambulatory Smoking Cessation program.    Diagnosis: F17.210    Next Visit: Ambulatory referral to Smoking Cessation program.

## 2019-01-28 NOTE — NURSING
Pt stated she does not want to take seroquel at the time that nurse gave rest of night medication.  Pt changed her mind now and she would like to take it.

## 2019-01-28 NOTE — NURSING
Notified Dr. Perez team that pt left room for going down to smoke soon after MD left the pt room; MD notified.  Informed that Dr. Harley mentioned about not leaving the room for smoking and if pt acts against the hospital policies again, MD will get PEC.  MD stated pt is not meet with criteria of PEC, will consider about it with the other doctors.

## 2019-01-28 NOTE — NURSING
Discharge discussed with pt. Pt   acknowledged understanding of discharge medications and follow up appts.  Recievied scripts from Dr Cm to take to her pharmacy of choice.Offered pt pneumonia shot but pt declined due to her ride was here. Pt with  0 distress at this time. IV removed with catheter intact.Denies pain or discomfort upon discharge. Hospital transport called for assistance.

## 2019-01-28 NOTE — PSYCH
IDENTIFICATION DATA:  This is a 53-year-old single white female who was brought   to ER due to abdominal pain, nausea, and vomiting.  The patient was found to   have bacteremia.  The patient was discharged on 01/15/2019 and was informed that   she has bacteria and has to come back to ER.  The patient is very upset with   all this.    HISTORY OF PRESENT ILLNESS:  The patient states that they discharged her when   she was in pain.  The patient admitted taking Percocet from a family member   before coming to the hospital.  She is not sure how she is positive for   amphetamines, because she is not using those things.  The patient has a history   of anxiety and panic.  According to her, she cries when she is manic and nervous   and thus stopped treatment.  The patient reported that she was on 7 or 8   antidepressants in the past and it made her a zombie and she decided not to take   medications.  The patient states that she tried to slit her wrist when she was   on medications.  The patient's family reported that she has anger problem and   she is hyperverbal.  She is impulsive and irritable.  She was cursing nursing   staff and fired her nurse.  She was using obscene language.  The patient states   that she is in pain and no one is listening to her.  The patient states that she   has no depression and no anxiety.  The patient states that yes she is angry   because she is in pain.  The patient stood up and shut the door and states that   she is leaving.  The patient's family has reported that the patient has anger   problem and sleep issues, but she is not using drugs.  The patient states that   other than health, she has no problem.  She reported that she gets along well   with her fiance.    PAST PSYCHIATRIC HISTORY:  The patient states that she used to go to place in   [place] for medications and they were giving her antidepressants.  She remembers   taking Seroquel and Zoloft in the past.  The patient admitted that  she slit her   wrist after the birth of her baby.  The patient was started on Cymbalta, which   she is not happy with.  She was Elavil 25 mg.  The patient denies history of   rehab program.    PAST PSYCHIATRIC HISTORY:  The patient states that she lives with her fiance.    She has family members in [place] and cone was on Suboxone and another was on   antidepressant.    MEDICAL HISTORY:  The patient has COPD, hypertension, hyperlipidemia, seizure   disorder.  She is on Cymbalta 30 mg per day, Elavil 25 mg at nighttime, Keppra.    She had CT scan which showed a massive stool in colon and distended   gallbladder.  The patient states that she has pain in the right side of the   pelvis, not upper and they keep focusing on upper area.  The patient has a   history of back surgery.  She is on Neurontin also.  SHE IS ALLERGIC ULTRAM.    She was given Percocet yesterday.  Her urine is positive for opioid and   amphetamine.  Her blood pressure is 138/82 with 104 pulse and normal   temperature.  Her WBC is 8.9, hemoglobin 16.5 and hematocrit 51.  ,   platelets 307, sodium 138, potassium 4.0, BUN 14, creatinine 0.9, liver   functions are normal.  She is on Flagyl.    MENTAL STATUS EXAMINATION:  This is a 53-year-old lean, short statured white   female who looks older than her stated age.  She is alert, partially cooperative   and oriented to day, date, month and year.  Mood is angry with sullen affect.    Psychomotor speech is increased in amount, rate and tone.  She has pressured   speech with flight of ideas.  She denies hearing voices or seeing things.  She   has no intention to harm to self or others.  Insight and judgment are fair.  She   is of average intelligent person.  She has poor impulse control.  Insight and   judgment are impaired.  She has adequate fund of knowledge.    PSYCHIATRIC DIAGNOSES:  AXIS I:  Possible bipolar disorder, manic without psychotic features.    Amphetamine use disorder, mild without  perceptual disturbance.  AXIS II:  No diagnosis.  AXIS III:  Chronic obstructive pulmonary disease, hypertension, hyperlipidemia,   seizure, abdominal pain.  AXIS IV:  Medical problems, poor coping skills.  AXIS V:  35.    RECOMMENDATIONS:  1.  The patient's family was given the option frequent of treatment of her   bipolar disorder with mood stabilizers and option of Suboxone when medically   cleared or need medicine for chronic pain.  2.  The patient may have opioid withdrawals, which could be dealt with schedule   Suboxone.        /nathaly [id number] review        ESTEBAN/YARITZA  dd: 01/26/2019 15:07:25 (CST)  td: 01/26/2019 22:56:12 (CST)  Doc ID   #[id number]  Job ID #[id number]    CC:

## 2019-01-28 NOTE — PLAN OF CARE
Pt to discharge home.  She states her God child will transport her.  [facility]  I will look into this facility and schedule appt if possible.  This  put name on white board and explained blue discharge folder to patient. Discharge planning brochure and/or business card given to patient.  Patient verbalized understanding.     01/28/19 1023   Discharge Assessment   Assessment Type Discharge Planning Assessment   Confirmed/corrected address and phone number on facesheet? Yes   Assessment information obtained from? Patient   Communicated expected length of stay with patient/caregiver yes   Prior to hospitilization cognitive status: Alert/Oriented;No Deficits   Prior to hospitalization functional status: Independent   Current cognitive status: Alert/Oriented;No Deficits   Current Functional Status: Independent   Lives With significant other   Is patient able to care for self after discharge? Yes   Who are your caregiver(s) and their phone number(s)? Zulema [phone]   Patient's perception of discharge disposition home or selfcare   Readmission Within the Last 30 Days no previous admission in last 30 days   Patient currently being followed by outpatient case management? No   Patient currently receives any other outside agency services? No   Equipment Currently Used at Home shower chair   Do you have any problems affording any of your prescribed medications? No   Is the patient taking medications as prescribed? yes   Does the patient have transportation home? Yes   Transportation Anticipated family or friend will provide   Does the patient receive services at the [facility]mad[facility] Clinic? No   Discharge Plan A Home   Discharge Plan B Home with family   DME Needed Upon Discharge  none   Patient/Family in Agreement with Plan yes

## 2019-01-28 NOTE — NURSING
"Informed Dr. Barboza that pt left the room to smoke downstairs, educate about smoking policy; no learning noted, pt stated "I can go wherever I want to."  MD stated treatment team will round early with the pt.   "

## 2019-01-28 NOTE — PROGRESS NOTES
[facility] Infectious Disease Progress Note     Primary Team: Medicine  Consultant Attending: Panda  Date of Admit: 2019    Summary of history     Anerobic bacteremia in a patient with undiagnosed chronic abdominal pain     Interval events     Psych added subutex and quetiapine to med regimen.    Subjective     Pt reports feeling much better this morning, without N/V/D. Says her stomach pain is much improved on new regimen. Was told by primary that she would be leaving this afternoon, and she reports feeling ready to go home. Denies cough, SOB, CP, abd pain, urinary sx this morning.    Current Medications:     Infusions:       Scheduled:   buprenorphine HCl  4 mg Sublingual TID    dicyclomine  20 mg Oral QID    fluticasone-vilanterol  1 puff Inhalation Daily    gabapentin  600 mg Oral TID    heparin (porcine)  5,000 Units Subcutaneous Q8H    lisinopril  20 mg Oral Daily    metronidazole  500 mg Intravenous Q8H    nicotine  1 patch Transdermal Daily    QUEtiapine  50 mg Oral QHS    tiotropium  1 capsule Inhalation Daily        PRN:  acetaminophen, albuterol, hyoscyamine, ondansetron, pneumoc 13-rakel conj-dip cr(PF), sodium chloride 0.9%    Antibiotics and Day Number of Therapy:  Metronidazole     Objective   Last 24 Hour Vital Signs:  BP  Min: 106/55  Max: 143/71  Temp  Av.1 °F (36.2 °C)  Min: 96.5 °F (35.8 °C)  Max: 97.6 °F (36.4 °C)  Pulse  Av.7  Min: 63  Max: 108  Resp  Av.3  Min: 16  Max: 20  SpO2  Av.3 %  Min: 93 %  Max: 97 %    Lines and Day Number of Therapy:  None    Physical Examination:  Examination  GEN-AOx3, NAD, sitting comfortably in bed  HEENT-EOMI, MMM  NECK-no thyromegaly or other masses  HEART-RRR, no murmurs or rubs  RESP-CTAB, normal work of breathing, no wheezes, crackles, or rhonchi  ABD-soft, NT, ND, +BS; no masses or HSM  EXT-no clubbing, cyanosis, or edema; 2+ PT pulses  NEURO- moves all four extremities equally  SKIN-warm and dry; no rashes      Lab data:  CBC:    Lab Results   Component Value Date    WBC 8.93 01/26/2019    HGB 16.5 (H) 01/26/2019     01/26/2019     (H) 01/26/2019    RDW 13.7 01/26/2019     Estimated Creatinine Clearance: 54.9 mL/min (based on SCr of 0.9 mg/dL).    Microbiology Data  Bacteroides in 1/4 bottles     Assessment     Amaya Hall is a 53 y.o.female with past medical history as above who has chronic abdominal pain which has been worked up and now has bacteroides bacteremia in 1/4 bottles. This organism points to an intraabdominal pathology as yet not elucidated.     In terms of antibacterial choice, would not cover for other classic non-anerobic GI organisms since these often grow out more easily than anerobes hence they were probably not present in blood at the time of that culture.       Recommendations     Anerobic bacteremia   - switch metronidazole IV to po, and plan to continue for total duration 2 weeks from negative blood cultures > stop date tentatively 2/8/19, but would prefer pt seen by PCP prior to discontinuation      Claire Mack MD  [facility] Infectious Diseases Service

## 2019-01-28 NOTE — NURSING
Pt is back in bed.  Pt insisted to take buprenorphine and other scheduled medication early, nurse explained her morning medications are scheduled at 0900.

## 2019-01-28 NOTE — PLAN OF CARE
Problem: Adult Inpatient Plan of Care  Goal: Plan of Care Review  Outcome: Ongoing (interventions implemented as appropriate)  Pt is agitated, cursing and exhibiting hostile, paranoid behavior. Plan of care reviewed with the patient. Verbalized clear understanding. Bed in lowest position. Call light within reach. Complaints of abdominal pain and nausea. Given IV promethazine. IV antibiotics given. No reported SOB. Will continue to monitor.

## 2019-01-28 NOTE — PLAN OF CARE
Dr. Harley talked to the pt about not leaving the room to go down smoke and about taking her own tylenol as against the hospital policies. Security will be called and she will get PEC'd if she will continue to go down smoke and dont listen to provider.

## 2019-01-28 NOTE — PLAN OF CARE
Problem: Adult Inpatient Plan of Care  Goal: Plan of Care Review  Outcome: Ongoing (interventions implemented as appropriate)  The proper method of use, as well as anticipated side effects, of this metered-dose inhaler are discussed and demonstrated to the patient. Will continue to monitor.

## 2019-01-28 NOTE — PROGRESS NOTES
Chart reviewed, the patient examined and case discussed with the staff.  The   staff has reported that the patient is a little bit better and was able to sleep   better.  The patient states that she had better sleep than before, but she   still tosses and turns.  She reported that she was on Seroquel before and it   helped and she will take Seroquel only 50 mg.  The patient does not believe that   she needs more of the Seroquel.  The patient states that she threw up with her   first pill of Subutex, but she was able to handle the second dose.  She has   noticed improvement in her pain and she is not hurting.  She has no nausea at   this time.  She is much better with communication.  She is not angry, hostile   and denies depression or anxiety.  The patient is still slamming the dose.  The   patient is oriented to day, date, month and year.  Mood is euthymic with normal   affect.  She showed appropriate smile.  She did not curse today.  She denies   hearing voices or seeing things.  She has no intention to harm to self or   others.  Insight and judgment are better.    ASSESSMENT AND PLAN:  1.  Education about Subutex and other opioids to the patient provided.  2.  We will discharge this patient as per plan, she will go back to home with   current medication.  3.  We will give Subutex 4 mg one sublingual 3 times a day x10 days and one   sublingual twice a day and should be tapered on an outpatient basis.  4.  Refer to [facility] for medications.  5.  Education about medication including Subutex provided.      ESTEBAN/IN  dd: 01/28/2019 09:25:28 (CST)  td: 01/28/2019 14:19:54 (CST)  Doc ID   #[id number]  Job ID #[id number]    CC:

## 2019-01-28 NOTE — NURSING
Informed Dr. Acevedo that nurse gave the scheduled dose gabapentin/dicyclomine/buprenophine/zofran at 2059.  Pt was agitated during taking the medication, nurse explained all the medication that pt was taking.  Pt suddenly started to keep stating that pt didn't take buprenophine, insisted to take another dose.  MD stated similar episode happened during daytime, and MD will visualize pt soon.

## 2019-01-28 NOTE — PLAN OF CARE
Problem: Adult Inpatient Plan of Care  Goal: Plan of Care Review  Outcome: Ongoing (interventions implemented as appropriate)   01/28/19 0635   Plan of Care Review   Plan of Care Reviewed With patient   POC reviewed with the pt, pt is agitated/cursing during night time due to pain; MD aware.  Complaint of R groin area, nausea, abdominal pain; scheduled and prn medication given.  IV flagyl given.  Special contact isolation maintained, no BM during night, stool need to be recollected due to small amount sample from previous shift.  Pt refused cares including meds, VS, lab.  Refusing safety measures, free of falls throughout shift.

## 2019-01-29 LAB
C DIFF GDH STL QL: POSITIVE
C DIFF TOX A+B STL QL IA: NEGATIVE
C DIFF TOX GENS STL QL NAA+PROBE: NEGATIVE
E COLI SXT1 STL QL IA: NEGATIVE
E COLI SXT2 STL QL IA: NEGATIVE

## 2019-01-30 LAB
BACTERIA BLD CULT: NORMAL
BACTERIA BLD CULT: NORMAL
BACTERIA STL CULT: NORMAL

## 2019-01-31 LAB
BACTERIA BLD CULT: NORMAL
PTH RELATED PROT SERPL-SCNC: 0.4 PMOL/L

## 2019-05-27 ENCOUNTER — TELEPHONE (OUTPATIENT)
Dept: NEUROLOGY | Facility: HOSPITAL | Age: 54
End: 2019-05-27

## 2019-05-27 NOTE — TELEPHONE ENCOUNTER
----- Message from Talia Arevalo sent at 5/24/2019  2:14 PM CDT -----  Contact: Tereza bales [facility] [phone]  PALMA Starks is calling to get the plans for the pt today. Please advise

## 2019-05-28 ENCOUNTER — TELEPHONE (OUTPATIENT)
Dept: NEUROLOGY | Facility: HOSPITAL | Age: 54
End: 2019-05-28

## 2019-05-28 NOTE — TELEPHONE ENCOUNTER
----- Message from Mark Membreno sent at 5/27/2019  2:30 PM CDT -----  Contact: [phone]/self  PALMA----Patient was just discharged from [facility] [address] she states she was told to call about being seen ASAP for a hospital follow-up appointment.   Please call back to assist at [phone]

## 2019-07-08 ENCOUNTER — TELEPHONE (OUTPATIENT)
Dept: NEUROLOGY | Facility: HOSPITAL | Age: 54
End: 2019-07-08

## 2019-07-08 NOTE — TELEPHONE ENCOUNTER
----- Message from Mark Membreno sent at 7/8/2019  9:06 AM CDT -----  Contact: [phone]/self  PALMA----Patient states she had surgery with Dr. Madrid at the [facility] and she would like to speak with him about the stomach pain she's still having.      Please call back to assist at [phone]

## 2019-07-08 NOTE — TELEPHONE ENCOUNTER
Pt has not been seen since surgery in May.  Pt still having abdominal pain, went to ED recently, not sure of date.  Clinic follow up scheduled with pt on Thursday, July 11, 2019 at 12pm.  Pt given clinic address and repeated all information correctly.

## 2019-07-11 ENCOUNTER — OFFICE VISIT (OUTPATIENT)
Dept: NEUROLOGY | Facility: HOSPITAL | Age: 54
End: 2019-07-11
Attending: SURGERY
Payer: MEDICAID

## 2019-07-11 VITALS
RESPIRATION RATE: 20 BRPM | HEART RATE: 75 BPM | WEIGHT: 98.13 LBS | DIASTOLIC BLOOD PRESSURE: 77 MMHG | HEIGHT: 64 IN | TEMPERATURE: 96 F | SYSTOLIC BLOOD PRESSURE: 121 MMHG | BODY MASS INDEX: 16.75 KG/M2

## 2019-07-11 DIAGNOSIS — R63.4 WEIGHT LOSS: ICD-10-CM

## 2019-07-11 DIAGNOSIS — R64 CACHEXIA: ICD-10-CM

## 2019-07-11 DIAGNOSIS — I77.4 CELIAC ARTERY COMPRESSION SYNDROME: Primary | ICD-10-CM

## 2019-07-11 PROCEDURE — 99213 OFFICE O/P EST LOW 20 MIN: CPT | Performed by: SURGERY

## 2019-07-11 RX ORDER — DICYCLOMINE HYDROCHLORIDE 20 MG/1
TABLET ORAL
Refills: 3 | COMMUNITY
Start: 2019-07-07

## 2019-07-11 RX ORDER — TIOTROPIUM BROMIDE 18 UG/1
18 CAPSULE ORAL; RESPIRATORY (INHALATION)
COMMUNITY
Start: 2019-05-20

## 2019-07-11 RX ORDER — AMLODIPINE BESYLATE 5 MG/1
TABLET ORAL
Refills: 0 | COMMUNITY
Start: 2019-05-25

## 2019-07-11 RX ORDER — ONDANSETRON 4 MG/1
TABLET, ORALLY DISINTEGRATING ORAL
Refills: 0 | COMMUNITY
Start: 2019-06-08 | End: 2020-08-08

## 2019-07-11 RX ORDER — HYDROCODONE BITARTRATE AND ACETAMINOPHEN 5; 325 MG/1; MG/1
1 TABLET ORAL EVERY 6 HOURS PRN
Qty: 10 TABLET | Refills: 0 | Status: SHIPPED | OUTPATIENT
Start: 2019-07-11

## 2019-07-11 RX ORDER — IBUPROFEN 200 MG
TABLET ORAL
Refills: 0 | COMMUNITY
Start: 2019-05-25

## 2019-07-11 RX ORDER — PANCRELIPASE 36000; 180000; 114000 [USP'U]/1; [USP'U]/1; [USP'U]/1
CAPSULE, DELAYED RELEASE PELLETS ORAL
Refills: 0 | COMMUNITY
Start: 2019-05-25

## 2019-07-11 NOTE — PROGRESS NOTES
"NOLANETS:  [facility]  A collaboration between [facility] and [facility]      PATIENT: Amaya Hall  MRN: [id number]  DATE: 7/11/2019    Subjective:      Chief Complaint: Abdominal Pain  c/o severe abdominal pain all the time  Intermittent lasting 20 mts to one hour nausea  And vomiting   Vomiting or diarrhea    Lost > 20 lbs    She was admitted at [facility] for abdominal pain.  Because of her significant weight loss and pain she was evaluated for mesenteric ischemia.  She had a CT scan done and also an angiogram which did not show any ischemic disease she had a significant celiac artery narrowing her superior mesenteric artery and inferior mesenteric arteries where in normal caliber.  Because of the finding of weight loss significant abdominal pain worsened eating and in the age group of 18 60 and being female a celiac artery compression syndrome was entertained.  She was discharged with instruction to follow up however because of other social issues she could not seen immediately    She continues to have abdominal pain    Vitals:   Vitals:    07/11/19 1155   BP: 121/77   Pulse: 75   Resp: 20   Temp: 96.2 °F (35.7 °C)   Weight: 44.5 kg (98 lb 1.7 oz)   Height: 5' 4" (1.626 m)        Karnofsky Score:     Diagnosis: No diagnosis found.     Oncologic History:     Interval History:     Past Medical History:  Past Medical History:   Diagnosis Date    Anxiety     COPD (chronic obstructive pulmonary disease)     Gastric ulcer, chronic     GERD (gastroesophageal reflux disease)     Hypercholesteremia     Hypertension        Past Surgical History:  Past Surgical History:   Procedure Laterality Date    BACK SURGERY      ESOPHAGOGASTRODUODENOSCOPY (EGD) N/A 3/30/2017    Performed by Constantine Avila MD at [facility] ENDO       Family History:  History reviewed. No pertinent family " history.    Allergies:  Review of patient's allergies indicates:   Allergen Reactions    Ultram [tramadol] Swelling       Medications:  Current Outpatient Medications   Medication Sig Dispense Refill    ALBUTEROL INHL 2.5 mg.      alum-mag hydroxide-simeth 225-200-25 mg/5 mL Susp Take as directed on bottle 360 mL 0    budesonide-formoterol 160-4.5 mcg (SYMBICORT) 160-4.5 mcg/actuation HFAA Inhale 2 puffs into the lungs every 12 (twelve) hours.      CREON 36,000-114,000- 180,000 unit CpDR TK ONE C PO WITH MEALS FOR 30 DAYS  0    dicyclomine (BENTYL) 20 mg tablet TK 1 T PO TID PRN  3    nicotine (NICODERM CQ) 14 mg/24 hr APPLY ONE  PATCH D  0    nicotine (NICODERM CQ) 21 mg/24 hr Place 1 patch onto the skin once daily. 1 patch 0    ondansetron (ZOFRAN) 4 MG tablet Take 1 tablet (4 mg total) by mouth every 8 (eight) hours as needed. 12 tablet 0    ondansetron (ZOFRAN-ODT) 4 MG TbDL TK 1 TABLET PO EVERY 8 HOURS FOR 3 DAYS  0    ranitidine (ZANTAC) 150 MG tablet TK 1 T PO HS  5    VENTOLIN HFA 90 mcg/actuation inhaler INL 2 PFS PO Q 4 H PRN  5    amitriptyline (ELAVIL) 25 MG tablet Take one pill by mouth nightly for pain. 30 tablet 0    amLODIPine (NORVASC) 5 MG tablet TK 1 T PO ONCE DAILY FOR 30 DAYS  0    atorvastatin (LIPITOR) 80 MG tablet Take 80 mg by mouth once daily.      duloxetine (CYMBALTA) 30 MG capsule Take 30 mg by mouth once daily.      esomeprazole magnesium (NEXIUM 24HR) 20 mg TbEC Take 20 mg by mouth once daily. 90 tablet 1    gabapentin (NEURONTIN) 600 MG tablet Take 600 mg by mouth 3 (three) times daily.      hyoscyamine (LEVSIN/SL) 0.125 mg Subl Place 1 tablet (0.125 mg total) under the tongue every 8 (eight) hours as needed (abdominal pain). 50 tablet 1    lisinopril-hydrochlorothiazide (PRINZIDE,ZESTORETIC) 20-25 mg Tab Take 1 tablet by mouth once daily. 30 tablet 6    pantoprazole (PROTONIX) 20 MG tablet Take 20 mg by mouth once daily.      potassium chloride SA  (K-DUR,KLOR-CON) 20 MEQ tablet Take 1 tablet (20 mEq total) by mouth once daily. 5 tablet 0    QUEtiapine (SEROQUEL) 50 MG tablet Take 1 tablet (50 mg total) by mouth every evening. 30 tablet 11    sucralfate (CARAFATE) 100 mg/mL suspension Take 10 mLs (1 g total) by mouth 4 (four) times daily with meals and nightly. 420 mL 2    tiotropium (SPIRIVA WITH HANDIHALER) 18 mcg inhalation capsule 18 mcg.       No current facility-administered medications for this visit.        Review of Systems   Constitutional: Positive for activity change, appetite change, chills, diaphoresis, fatigue, fever and unexpected weight change.   HENT: Negative for congestion, dental problem, drooling, ear discharge, ear pain, facial swelling, hearing loss, mouth sores, nosebleeds, postnasal drip, rhinorrhea, sinus pressure, sinus pain, sneezing, sore throat, tinnitus, trouble swallowing and voice change.    Eyes: Positive for visual disturbance. Negative for photophobia, pain and redness.   Respiratory: Negative for apnea, cough, choking, chest tightness, shortness of breath, wheezing and stridor.    Cardiovascular: Positive for leg swelling. Negative for chest pain and palpitations.   Gastrointestinal: Positive for abdominal distention, abdominal pain, constipation, nausea and vomiting. Negative for anal bleeding.   Endocrine: Negative.    Genitourinary: Negative.    Musculoskeletal: Positive for back pain.   Skin: Negative for color change, pallor and wound.   Allergic/Immunologic: Negative.    Neurological: Negative for syncope, facial asymmetry, weakness, light-headedness, numbness and headaches.   Hematological: Does not bruise/bleed easily.   Psychiatric/Behavioral: Negative for agitation, behavioral problems, confusion, decreased concentration, dysphoric mood and hallucinations. The patient is not hyperactive.       Objective:      Physical Exam   Constitutional: She is oriented to person, place, and time. No distress.   HENT:    Head: Normocephalic and atraumatic.   Right Ear: External ear normal.   Left Ear: External ear normal.   Eyes: Pupils are equal, round, and reactive to light. Conjunctivae and EOM are normal.   Neck: Normal range of motion.   Cardiovascular: Normal rate and regular rhythm.   Pulmonary/Chest: Effort normal and breath sounds normal.   Abdominal: Soft. Bowel sounds are normal. She exhibits no distension. There is no tenderness. No hernia.   Musculoskeletal: Normal range of motion.   Neurological: She is alert and oriented to person, place, and time.   Skin: Skin is warm. She is not diaphoretic.   Psychiatric: She has a normal mood and affect. Her behavior is normal.      Assessment:       No diagnosis found.    Laboratory Data:   I reviewed all her previous workup  Done at [facility] scan reviewed  Labs reviewed  She had all the tumor markers done because of her significant weight loss and abdominal pain the tumor markers were all the normal range  Because of a history of significant smoking a CT scan of the chest was done and she had detail evaluation to rule out cancer.  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  She has celiac artery compression syndrome.  Will obtain ultrasound duplex of the celiac artery on deep inspiration and expiration to confirm the diagnosis of celiac artery syndrome.    I strongly recommended that she quit smoking.  according to her she has she has cut down smoking significantly.  However she still smokes close to 10 cigarettes a day.  The 1st step would be to proceed with the decompression of the celiac artery.  Will plan to do a robotic/laparoscopic decompression of the celiac artery.  If he does not work she could be a candidate for  celiac artery plexus block. If with all these separate procedures of the pain is not better she may need revascularization of the celiac artery.  Plan:       Will obtain ultrasound of the duplex with a on deep  Inspiration expiration.    Will need surgical exploration and decompression of the celiac artery doctor about the  Surgery risks and benefits.  If it does not work she may need a plexus block are vascular reconstruction of the celiac artery.    Spent more than 30 min in talking to her about the diagnosis condition and the plan for intervention                  JOY Streeter MD, FACS   Associate Professor of Surgery, [facility]   Neuroendocrine Surgery, Hepatic/Pancreatic & General Surgery   [address]   [place] [address]   ph. [phone]; [phone]   fax. [phone]

## 2019-07-12 ENCOUNTER — TELEPHONE (OUTPATIENT)
Dept: NEUROLOGY | Facility: HOSPITAL | Age: 54
End: 2019-07-12

## 2019-07-12 NOTE — TELEPHONE ENCOUNTER
----- Message from Norma Alfonso sent at 7/11/2019  3:00 PM CDT -----  Contact: [phone]-self  July 24, 2019 at 845a is patients appt for scan. States she was told to call office with this info. Exact type of scan is unknown. Please advise.

## 2019-07-18 ENCOUNTER — TELEPHONE (OUTPATIENT)
Dept: NEUROLOGY | Facility: HOSPITAL | Age: 54
End: 2019-07-18

## 2019-07-18 NOTE — TELEPHONE ENCOUNTER
----- Message from Carly Segura sent at 7/18/2019  8:11 AM CDT -----  Contact: [phone]/self  Patient requesting to speak with you regarding getting a prescription for pain medication and  Nausea. Please advise.

## 2019-07-18 NOTE — TELEPHONE ENCOUNTER
----- Message from Ifeoma Simmons sent at 7/18/2019 10:30 AM CDT -----  Contact: gavin / Zulema [phone]  PALMA:: Patient is returning a call from your office. Please advise

## 2019-07-18 NOTE — TELEPHONE ENCOUNTER
Patient showed up here at the office with chief complaint of right upper quad pain of 9 out of 10. Patient was seen in the ER at [facility] on yesterday. Patients case was discussed with Dr. Leigh here in the office. Dr Streeter ordered for patient to report back to the ER to be worked up and further evaluated. Patient had no further questions at this time.

## 2019-07-24 ENCOUNTER — HOSPITAL ENCOUNTER (OUTPATIENT)
Dept: RADIOLOGY | Facility: HOSPITAL | Age: 54
Discharge: HOME | End: 2019-07-24
Attending: SURGERY
Payer: MEDICAID

## 2019-07-24 ENCOUNTER — TELEPHONE (OUTPATIENT)
Dept: NEUROLOGY | Facility: HOSPITAL | Age: 54
End: 2019-07-24

## 2019-07-24 DIAGNOSIS — R63.4 WEIGHT LOSS: ICD-10-CM

## 2019-07-24 DIAGNOSIS — I77.4 CELIAC ARTERY COMPRESSION SYNDROME: ICD-10-CM

## 2019-07-24 DIAGNOSIS — R64 CACHEXIA: ICD-10-CM

## 2019-07-24 PROCEDURE — 93976 VASCULAR STUDY: CPT | Mod: 26,,, | Performed by: RADIOLOGY

## 2019-07-24 PROCEDURE — 93976 VASCULAR STUDY: CPT | Mod: TC

## 2019-07-24 PROCEDURE — 76775 US EXAM ABDO BACK WALL LIM: CPT | Mod: 26,XS,, | Performed by: RADIOLOGY

## 2019-07-24 PROCEDURE — 76775 US EXAM ABDO BACK WALL LIM: CPT | Mod: TC

## 2019-07-24 PROCEDURE — 93976 US MESENTERIC ISCHEMIA STUDY (XPD): ICD-10-PCS | Mod: 26,,, | Performed by: RADIOLOGY

## 2019-07-24 PROCEDURE — 76775 US MESENTERIC ISCHEMIA STUDY (XPD): ICD-10-PCS | Mod: 26,XS,, | Performed by: RADIOLOGY

## 2019-07-24 NOTE — TELEPHONE ENCOUNTER
Called patient again and sent to voice mail.     ADD 3:01PM: Spoke with the patient and she is requesting pain medication until she can come in to have her surgery. Per Dr. Madrid he will not prescribe pain medication at this time. If she is in pain she needs to go to the ED and she can follow up in the office with him on his next available visit.

## 2019-07-24 NOTE — TELEPHONE ENCOUNTER
Left message for return call with her daughter at the number listed and on voice mail at the number given by her daughter, [phone].

## 2019-07-24 NOTE — TELEPHONE ENCOUNTER
Spoke with the patient and Dr. Madrid asked her to schedule an appointment. If she is in severe pain she needs to go to the ED. Patient's spouse is requesting a call back to speak directly with Dr. Madrid.

## 2019-07-24 NOTE — TELEPHONE ENCOUNTER
----- Message from Jennifer Thomason sent at 7/24/2019  9:09 AM CDT -----  Contact: self, [phone] (M)  Patient states she had her ultrasound done this morning and requests to speak with you about having medication prescribed for pain.

## 2019-07-24 NOTE — TELEPHONE ENCOUNTER
----- Message from Tammie Samayoa sent at 7/24/2019  4:03 PM CDT -----  Contact: Self/ [phone]  PALMA: Patient called in returning your call.    Please call.

## 2019-07-24 NOTE — TELEPHONE ENCOUNTER
----- Message from Ifeoma Simmons sent at 7/24/2019  2:28 PM CDT -----  Contact: self / [phone]  PALMA: Patient is returning a call from your office. Please advise

## 2019-07-26 ENCOUNTER — TELEPHONE (OUTPATIENT)
Dept: NEUROLOGY | Facility: HOSPITAL | Age: 54
End: 2019-07-26

## 2019-07-26 RX ORDER — HYDROCODONE BITARTRATE AND ACETAMINOPHEN 5; 325 MG/1; MG/1
1 TABLET ORAL EVERY 6 HOURS PRN
Qty: 15 TABLET | Refills: 0 | OUTPATIENT
Start: 2019-07-26 | End: 2020-08-08

## 2019-07-26 NOTE — TELEPHONE ENCOUNTER
"Returned patients call, spoke with Mr. Ramires. Spouse stated that patient is still in pain of 12 out of 10. Patient is taking hot showers to try to compress the abdominal pain, but has still be unsuccessful. Spouse stated that he did not want to speak to myself, as he wanted to speak directly only to Dr. Streeter in regards to patients condition. Spouse was advised of patients appointment to see Dr. Streeter on Monday and if the pain was persistence or worsen to bring the patient to the ER. Spouse yelled "you do not care about my spouse, and you think that she is drug seeking". " The ER has now been testing her for drugs, and we are not going back there anymore". "Have Dr. Streeter return my call as I am extremely angry". Ike then disconnected to phone.   "

## 2019-07-26 NOTE — TELEPHONE ENCOUNTER
----- Message from Norma Alfonso sent at 7/25/2019  3:13 PM CDT -----  Contact: [phone]-self  Pt requesting a callback concerning her test results, also states she is in pain. Please call and advise.

## 2019-07-26 NOTE — TELEPHONE ENCOUNTER
----- Message from Yanira Shane sent at 7/25/2019  9:41 AM CDT -----  Contact:  Ike [phone]  PALMA  Patient's spouse calling in regarding patient's condition. Please call and advise.

## 2019-07-29 ENCOUNTER — OFFICE VISIT (OUTPATIENT)
Dept: NEUROLOGY | Facility: HOSPITAL | Age: 54
End: 2019-07-29
Attending: SURGERY
Payer: MEDICAID

## 2019-07-29 VITALS
TEMPERATURE: 95 F | BODY MASS INDEX: 17.29 KG/M2 | WEIGHT: 101.31 LBS | SYSTOLIC BLOOD PRESSURE: 118 MMHG | DIASTOLIC BLOOD PRESSURE: 78 MMHG | HEART RATE: 67 BPM | HEIGHT: 64 IN

## 2019-07-29 DIAGNOSIS — I77.4 CELIAC ARTERY COMPRESSION SYNDROME: Primary | ICD-10-CM

## 2019-07-29 DIAGNOSIS — R10.9 ABDOMINAL PAIN, UNSPECIFIED ABDOMINAL LOCATION: ICD-10-CM

## 2019-07-29 PROCEDURE — 99213 OFFICE O/P EST LOW 20 MIN: CPT | Performed by: SURGERY

## 2019-07-29 NOTE — PROGRESS NOTES
NOLANETS:  [facility]  A collaboration between [facility] and [facility]      PATIENT: Amaya Hall  MRN: [id number]  DATE: 7/29/2019    Subjective:      Chief Complaint: Follow-up (follow up visit after u/s )   This is a 53-year-old female who has had multiple hospital admissions and ER visits for severe abdominal pain. Patient was here in March with a severe abdominal pain she had a CT scan of the abdomen done at that time which showed calcification of the artery.  Later on she went to the emergency room at [facility] with the same problem patient was admitted.  She underwent CT scan of the abdomen with angiogram.  He did not show any evidence of mesenteric ischemia,.  Initially was thought to have significant stenosis of the celiac and SMA.  An attempt was done to angioplasty the SMA however during the process she was found have a significant celiac artery stenosis with a patent SMA and inferior mesenteric artery.  The guidewire could not be negotiated because of the angulation and the celiac artery narrowing could not be addressed.    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    She was discharged at that time and was supposed to follow up however she has had some few personal family issues she did not come to the clinic.  The a    In the last few weeks she has had a multiple ER visits for abdominal pain nausea vomiting to [facility] as well as in history of ER.  She also has more than 50 lb weight loss.  She is also in the process of evaluation for lung nodule as she has a chronic smoker.  She continues to have significant abdominal pain associated with nausea.   Vitals:    Vitals:    07/29/19 0938   BP: 118/78   BP Location: Left arm   Patient Position: Sitting   BP Method:  "Small (Automatic)   Pulse: 67   Temp: (!) 95 °F (35 °C)   Weight: 45.9 kg (101 lb 4.8 oz)   Height: 5' 4" (1.626 m)        Karnofsky Score:     Diagnosis: No diagnosis found.     Oncologic History:     Interval History:     Past Medical History:  Past Medical History:   Diagnosis Date    Anxiety     COPD (chronic obstructive pulmonary disease)     Gastric ulcer, chronic     GERD (gastroesophageal reflux disease)     Hypercholesteremia     Hypertension        Past Surgical History:  Past Surgical History:   Procedure Laterality Date    BACK SURGERY      ESOPHAGOGASTRODUODENOSCOPY (EGD) N/A 3/30/2017    Performed by Constantine Avila MD at [facility] ENDO       Family History:  History reviewed. No pertinent family history.    Allergies:  Review of patient's allergies indicates:   Allergen Reactions    Ultram [tramadol] Swelling       Medications:  Current Outpatient Medications   Medication Sig Dispense Refill    ALBUTEROL INHL 2.5 mg.      alum-mag hydroxide-simeth 225-200-25 mg/5 mL Susp Take as directed on bottle 360 mL 0    amitriptyline (ELAVIL) 25 MG tablet Take one pill by mouth nightly for pain. 30 tablet 0    amLODIPine (NORVASC) 5 MG tablet TK 1 T PO ONCE DAILY FOR 30 DAYS  0    atorvastatin (LIPITOR) 80 MG tablet Take 80 mg by mouth once daily.      budesonide-formoterol 160-4.5 mcg (SYMBICORT) 160-4.5 mcg/actuation HFAA Inhale 2 puffs into the lungs every 12 (twelve) hours.      CREON 36,000-114,000- 180,000 unit CpDR TK ONE C PO WITH MEALS FOR 30 DAYS  0    dicyclomine (BENTYL) 20 mg tablet TK 1 T PO TID PRN  3    duloxetine (CYMBALTA) 30 MG capsule Take 30 mg by mouth once daily.      esomeprazole magnesium (NEXIUM 24HR) 20 mg TbEC Take 20 mg by mouth once daily. 90 tablet 1    gabapentin (NEURONTIN) 600 MG tablet Take 600 mg by mouth 3 (three) times daily.      HYDROcodone-acetaminophen (NORCO) 5-325 mg per tablet Take 1 tablet by mouth every 6 (six) hours as needed for Pain. 10 tablet 0 "    HYDROcodone-acetaminophen (NORCO) 5-325 mg per tablet Take 1 tablet by mouth every 6 (six) hours as needed for Pain. 15 tablet 0    hyoscyamine (LEVSIN/SL) 0.125 mg Subl Place 1 tablet (0.125 mg total) under the tongue every 8 (eight) hours as needed (abdominal pain). 50 tablet 1    nicotine (NICODERM CQ) 14 mg/24 hr APPLY ONE  PATCH D  0    nicotine (NICODERM CQ) 21 mg/24 hr Place 1 patch onto the skin once daily. 1 patch 0    ondansetron (ZOFRAN) 4 MG tablet Take 1 tablet (4 mg total) by mouth every 8 (eight) hours as needed. 12 tablet 0    ondansetron (ZOFRAN-ODT) 4 MG TbDL TK 1 TABLET PO EVERY 8 HOURS FOR 3 DAYS  0    pantoprazole (PROTONIX) 20 MG tablet Take 20 mg by mouth once daily.      potassium chloride SA (K-DUR,KLOR-CON) 20 MEQ tablet Take 1 tablet (20 mEq total) by mouth once daily. 5 tablet 0    QUEtiapine (SEROQUEL) 50 MG tablet Take 1 tablet (50 mg total) by mouth every evening. 30 tablet 11    ranitidine (ZANTAC) 150 MG tablet TK 1 T PO HS  5    sucralfate (CARAFATE) 100 mg/mL suspension Take 10 mLs (1 g total) by mouth 4 (four) times daily with meals and nightly. 420 mL 2    tiotropium (SPIRIVA WITH HANDIHALER) 18 mcg inhalation capsule 18 mcg.      VENTOLIN HFA 90 mcg/actuation inhaler INL 2 PFS PO Q 4 H PRN  5     No current facility-administered medications for this visit.        Review of Systems   Constitutional: Positive for activity change, appetite change, fatigue and unexpected weight change.   HENT: Negative for congestion, dental problem, drooling, ear discharge, ear pain, facial swelling, hearing loss, mouth sores, nosebleeds, postnasal drip, rhinorrhea, sinus pressure, sinus pain, sneezing, sore throat, tinnitus and voice change.    Eyes: Negative for photophobia, pain, discharge, redness, itching and visual disturbance.   Respiratory: Positive for cough, chest tightness, shortness of breath and wheezing. Negative for apnea and choking.    Cardiovascular: Negative for  chest pain, palpitations and leg swelling.   Gastrointestinal: Positive for abdominal distention, abdominal pain, nausea and vomiting. Negative for constipation, diarrhea and rectal pain.   Endocrine: Negative.    Genitourinary: Negative.    Musculoskeletal: Positive for back pain, gait problem, joint swelling and myalgias. Negative for neck pain and neck stiffness.   Skin: Negative for pallor, rash and wound.   Allergic/Immunologic: Negative.    Neurological: Positive for seizures. Negative for dizziness, tremors, syncope, facial asymmetry, speech difficulty, weakness, light-headedness, numbness and headaches.   Hematological: Negative for adenopathy. Does not bruise/bleed easily.   Psychiatric/Behavioral: Negative for confusion and hallucinations.      Objective:      Physical Exam   Constitutional: No distress.   HENT:   Head: Normocephalic and atraumatic.   Right Ear: External ear normal.   Left Ear: External ear normal.   Mouth/Throat: Oropharynx is clear and moist.   Eyes: Pupils are equal, round, and reactive to light. Conjunctivae and EOM are normal.   Neck: Normal range of motion.   Cardiovascular: Normal rate and regular rhythm.   Pulmonary/Chest: Effort normal and breath sounds normal.   Abdominal: Soft. Bowel sounds are normal. She exhibits no mass. There is no rebound. No hernia.   Musculoskeletal: Normal range of motion.   Neurological: She is alert.   Skin: Skin is warm and dry. She is not diaphoretic.   Psychiatric: She has a normal mood and affect. Her behavior is normal.      Assessment:       No diagnosis found.    Laboratory Data:   Release     [facility] Status: Pending  Results Release   PACS Images for [facility] Viewer      Show images for US Mesenteric Ischemia Study (xpd)   PACS Images for ViTAL [facility]olai Viewer (Includes [facility] and [facility] Images)      Show images for US Mesenteric Ischemia Study (xpd)   All Reviewers List     Ferdinand Keene MD on 7/24/2019  10:00   US Mesenteric Ischemia Study (xpd)   Order: [id number]   Status:  Final result   Visible to patient:  No (Not Released) Next appt:  None Dx:  Celiac artery compression syndrome; C...   Details     Reading Physician Reading Date Result Priority   Doris Brennan MD 7/24/2019       Narrative     EXAMINATION:  US MESENTERIC ISCHEMIA STUDY (XPD)    CLINICAL HISTORY:  pt has celiac artery compression syndrome. need duplex of celiac artery on deep inspiration and expiration;  Celiac artery compression syndrome    TECHNIQUE:  Doppler US images were obtained of the mesenteric vasculature.    FINDINGS:  The aorta demonstrates a normal caliber throughout.  Proximally it measures 2.4 x 2.5 cm, mid 1.4 x 1.8 cm, distal 1.0 x 1.2 cm.  There is significant abdominal aorta atherosclerotic plaque.    The right iliac artery is small measuring 0.6 x 0.5 cm with significant atherosclerotic plaque.  The left iliac artery is small measuring 0.7 x 0.6 cm with significant atherosclerotic plaque.    The celiac artery, superior mesenteric artery (SMA), and inferior mesenteric artery (DONNIE ) are visualized.    The velocity in the celiac artery is 371 cm/sec resulting in a celiac /aorta ratio of 7.5.    The velocity in the celiac artery at expiration is 399 cm/sec.    The velocity in the celiac artery in an erect position is 310 cm/sec.    The velocity in the SMA is 318 cm/sec resulting in a SMA/aorta ratio 6.0.    The velocity in the DONNIE is 261 cm/sec resulting in a DONNIE / aortic ratio 4.9.      Impression       Greater than 70% stenosis noted of the celiac trunk and SMA according to elevated velocity and elevated ratios.    High celiac velocity decreasing with standing position and increasing with expiration suggest median arcuate ligament syndrome.    Significant atherosclerotic plaque of the aorta.    Small caliber of the bilateral iliac artery with significant atherosclerotic plaque.    Mesenteric Doppler  ultrasound    Reference: [facility] criteria, [facility] .      Electronically signed by: Doris Brennan MD  Date: 07/24/2019  Time: 09:39                        Results for MARIBELLGADIELNICOLE SERGE (MRN [id number]) as of 7/29/2019 15:29   Ref. Range 1/27/2019 09:41 1/28/2019 10:01   WBC Latest Ref Range: 3.90 - 12.70 K/uL  11.96   RBC Latest Ref Range: 4.00 - 5.40 M/uL  4.83   Hemoglobin Latest Ref Range: 12.0 - 16.0 g/dL  15.8   Hematocrit Latest Ref Range: 37.0 - 48.5 %  47.9   MCV Latest Ref Range: 82 - 98 fL  99 (H)   MCH Latest Ref Range: 27.0 - 31.0 pg  32.7 (H)   MCHC Latest Ref Range: 32.0 - 36.0 g/dL  33.0   RDW Latest Ref Range: 11.5 - 14.5 %  13.5   Platelets Latest Ref Range: 150 - 350 K/uL  271   MPV Latest Ref Range: 9.2 - 12.9 fL  9.0 (L)   Gran% Latest Ref Range: 38.0 - 73.0 %  57.6   Gran # (ANC) Latest Ref Range: 1.8 - 7.7 K/uL  6.9   Lymph% Latest Ref Range: 18.0 - 48.0 %  31.9   Lymph # Latest Ref Range: 1.0 - 4.8 K/uL  3.8   Mono% Latest Ref Range: 4.0 - 15.0 %  9.4   Mono # Latest Ref Range: 0.3 - 1.0 K/uL  1.1 (H)   Eosinophil% Latest Ref Range: 0.0 - 8.0 %  0.7   Eos # Latest Ref Range: 0.0 - 0.5 K/uL  0.1   Basophil% Latest Ref Range: 0.0 - 1.9 %  0.1   Baso # Latest Ref Range: 0.00 - 0.20 K/uL  0.01   Differential Method Unknown  Automated   Sodium Latest Ref Range: 136 - 145 mmol/L  137   Potassium Latest Ref Range: 3.5 - 5.1 mmol/L  3.7   Chloride Latest Ref Range: 95 - 110 mmol/L  99   CO2 Latest Ref Range: 23 - 29 mmol/L  26   Anion Gap Latest Ref Range: 8 - 16 mmol/L  12   BUN, Bld Latest Ref Range: 6 - 20 mg/dL  16   Creatinine Latest Ref Range: 0.5 - 1.4 mg/dL  1.1   eGFR if non African American Latest Ref Range: >60 mL/min/1.73 m^2  57 (A)   eGFR if African American Latest Ref Range: >60 mL/min/1.73 m^2  >60   Glucose Latest Ref Range: 70 - 110 mg/dL  90   Calcium Latest Ref Range: 8.7 - 10.5 mg/dL  9.7   Alkaline Phosphatase Latest Ref Range: 55 - 135 U/L  81    PROTEIN TOTAL Latest Ref Range: 6.0 - 8.4 g/dL  7.5   Albumin Latest Ref Range: 3.5 - 5.2 g/dL  4.3   BILIRUBIN TOTAL Latest Ref Range: 0.1 - 1.0 mg/dL  0.6   AST Latest Ref Range: 10 - 40 U/L  33   ALT Latest Ref Range: 10 - 44 U/L  29   PTH Latest Ref Range: 9.0 - 77.0 pg/mL  102.8 (H)   PTH-Related Protein Latest Ref Range: <2.0 pmol/L  0.4   Acetaminophen (Tylenol), Serum Latest Ref Range: 10.0 - 20.0 ug/mL  <3.0 (L)   C. diff PCR Latest Ref Range: Negative  Negative    C. diff Antigen Latest Ref Range: Negative  Positive (A)    C difficile Toxins A+B, EIA Latest Ref Range: Negative  Negative    Stool Exam-Ova,Cysts,Parasites Unknown No ova or parasites seen    Stool WBC Latest Ref Range: No neutrophils seen  No neutrophils seen    CLOSTRIDIUM DIFFICILE Unknown Rpt (A)      Results for NICOLE REYNA (MRN [id number]) as of 7/29/2019 15:29   Ref. Range 4/4/2016 11:24 11/25/2018 11:33   COMPLETE PFT WITH BRONCHODILATOR Unknown Attch Rpt (A)   Pre FVC Latest Ref Range: 2.57 - 4.10 L  2.50 (L)   Pre FEV1 Latest Ref Range: 2.04 - 3.26 L  1.75 (L)   Pre FEV1 FVC Latest Ref Range: 68.37 - 91.40 %  70.00   Pre TLC Latest Ref Range: 3.98 - 5.96 L  4.73   Pre DLCO Latest Ref Range: 17.85 - 29.32 ml/(min*mmHg)  10.42 (L)   DLCO ADJ PRE Latest Ref Range: 17.85 - 29.32 ml/(min*mmHg)  10.42 (L)       Impression:  53-year-old female with a significant complaints of abdominal pain nausea or more than 30 lb weight loss with persistent diarrhea, evaluate for mesenteric ischemia.  She was found to have celiac artery stenosis.  Her examination is compatible with celiac artery compression syndrome.  She had ultrasound of the Doppler with deep inspiration and expiration and this corresponds to the celiac artery compression syndrome.    She continues to smoke however she has come down on the smoking.    She is in the age group between 40 and 60, has significant weight loss and diarrhea.  She looks to be in a good candidate for  celiac artery decompression.  The plan would be to attempt laparoscopic decompression of the celiac artery.  I informed her that there is only 50% chance that her pain may get better.  If she does not get better she may need a celiac artery bypass.  Talked to her about risks of surgery such barbie risk of bleeding infection DVT PE the need for laparotomy and repair of the artery there is any bleeding issue.  I explained the procedure where she would undergo laparoscopy and release of muscles around the celiac artery.    I clearly informed her that this procedure should not cause any significant pain and I would not take care of the pain  I strongly suggested that she follow-up with her primary care physician.  I clearly informed her this procedure is 50% successful and if not she may require arterial bypass which increases the pain by another 20%  Plan:       Plan for laparoscopic decompression of the celiac artery as the 1st measure.  Pain management may be issue.  Patient was clearly informed about the success of the procedure in terms of pain to about 50% and if she does not get better she may need a celiac artery bypass.  Also she needs a follow-up for her lung nodule.  During her last admission at [facility] she has had  tumor markers done which were negative.    Plan for decompression of the celiac artery compression.  Spent more than 30 min talking to her about the surgery risks involved.               JOY Streeter MD, FACS   Associate Professor of Surgery, [facility]   Neuroendocrine Surgery, Hepatic/Pancreatic & General Surgery   [address]   [place] [address]   ph. [phone]; [phone]   fax. [phone]

## 2019-07-30 ENCOUNTER — TELEPHONE (OUTPATIENT)
Dept: NEUROLOGY | Facility: HOSPITAL | Age: 54
End: 2019-07-30

## 2019-07-30 NOTE — TELEPHONE ENCOUNTER
----- Message from Yanira Shane sent at 7/30/2019  1:59 PM CDT -----  Contact: [phone]/self  PALMA  Patient is calling to get her arrival time for a procedure tomorrow. Please call and advise.

## 2019-07-31 LAB
EXT 24 HR UR METANEPHRINE: ABNORMAL
EXT 24 HR UR NORMETANEPHRINE: ABNORMAL
EXT 24 HR UR NORMETANEPHRINE: ABNORMAL
EXT 25 HYDROXY VIT D2: ABNORMAL
EXT 25 HYDROXY VIT D3: ABNORMAL
EXT 5 HIAA 24 HR URINE: ABNORMAL
EXT 5 HIAA BLOOD: ABNORMAL
EXT ACTH: ABNORMAL
EXT AFP: ABNORMAL
EXT ALBUMIN: ABNORMAL
EXT ALKALINE PHOSPHATASE: ABNORMAL
EXT ALT: ABNORMAL
EXT AMYLASE: ABNORMAL
EXT ANTI ISLET CELL AB: ABNORMAL
EXT ANTI PARIETAL CELL AB: ABNORMAL
EXT ANTI THYROID AB: ABNORMAL
EXT AST: ABNORMAL
EXT BILIRUBIN DIRECT: ABNORMAL MG/DL
EXT BILIRUBIN TOTAL: ABNORMAL
EXT BK VIRUS DNA QN PCR: ABNORMAL
EXT BUN: ABNORMAL
EXT C PEPTIDE: ABNORMAL
EXT CA 125: ABNORMAL
EXT CA 19-9: ABNORMAL
EXT CA 27-29: ABNORMAL
EXT CALCITONIN: ABNORMAL
EXT CALCIUM: 8.3 MG/DL (ref 8.5–10.4)
EXT CEA: ABNORMAL
EXT CHLORIDE: 98 MEQ/L (ref 98–107)
EXT CHOLESTEROL: ABNORMAL
EXT CHROMOGRANIN A: ABNORMAL
EXT CO2: 22 MEQ/L (ref 21–31)
EXT CREATININE UA: ABNORMAL
EXT CREATININE: 0.8 MG/DL (ref 0.5–1)
EXT CYCLOSPORONE LEVEL: ABNORMAL
EXT DOPAMINE: ABNORMAL
EXT EBV DNA BY PCR: ABNORMAL
EXT EPINEPHRINE: ABNORMAL
EXT FOLATE: ABNORMAL
EXT FREE T3: ABNORMAL
EXT FREE T4: ABNORMAL
EXT FSH: ABNORMAL
EXT GASTRIN RELEASING PEPTIDE: ABNORMAL
EXT GASTRIN RELEASING PEPTIDE: ABNORMAL
EXT GASTRIN: ABNORMAL
EXT GGT: ABNORMAL
EXT GHRELIN: ABNORMAL
EXT GLUCAGON: ABNORMAL
EXT GLUCOSE: 208 MG/DL (ref 70–100)
EXT GROWTH HORMONE: ABNORMAL
EXT HCV RNA QUANT PCR: ABNORMAL
EXT HDL: ABNORMAL
EXT HEMATOCRIT: 39.9 % (ref 37–47)
EXT HEMOGLOBIN A1C: ABNORMAL %
EXT HEMOGLOBIN: 13.5 GRAM/DL (ref 12–16)
EXT HISTAMINE 24 HR URINE: ABNORMAL
EXT HISTAMINE: ABNORMAL
EXT IGF-1: ABNORMAL
EXT IMMUNKNOW (NON-STIMULATED): ABNORMAL
EXT IMMUNKNOW (STIMULATED): ABNORMAL
EXT INR: ABNORMAL
EXT INSULIN: ABNORMAL
EXT LANREOTIDE LEVEL: ABNORMAL
EXT LDH, TOTAL: ABNORMAL
EXT LDL CHOLESTEROL: ABNORMAL
EXT LIPASE: ABNORMAL
EXT MAGNESIUM: ABNORMAL
EXT METANEPHRINE FREE PLASMA: ABNORMAL
EXT MOTILIN: ABNORMAL
EXT NEUROKININ A CAMB: ABNORMAL
EXT NEUROKININ A ISI: ABNORMAL
EXT NEUROTENSIN: ABNORMAL
EXT NOREPINEPHRINE: ABNORMAL
EXT NORMETANEPHRINE: ABNORMAL
EXT NSE: ABNORMAL
EXT OCTREOTIDE LEVEL: ABNORMAL
EXT PANCREASTATIN CAMB: ABNORMAL
EXT PANCREASTATIN ISI: ABNORMAL
EXT PANCREATIC POLYPEPTIDE: ABNORMAL
EXT PHOSPHORUS: ABNORMAL
EXT PLATELETS: ABNORMAL
EXT POTASSIUM: 3.6 MEQ/L (ref 3.5–5)
EXT PROGRAF LEVEL: ABNORMAL
EXT PROLACTIN: ABNORMAL
EXT PROTEIN TOTAL: ABNORMAL
EXT PROTEIN UA: ABNORMAL
EXT PT: ABNORMAL
EXT PTH, INTACT: ABNORMAL
EXT PTT: ABNORMAL
EXT RAPAMUNE LEVEL: ABNORMAL
EXT SEROTONIN: ABNORMAL
EXT SODIUM: 137 MEQ/L (ref 135–145)
EXT SOMATOSTATIN: ABNORMAL
EXT SUBSTANCE P: ABNORMAL
EXT TRIGLYCERIDES: ABNORMAL
EXT TRYPTASE: ABNORMAL
EXT TSH: ABNORMAL
EXT URIC ACID: ABNORMAL
EXT URINE AMYLASE U/HR: ABNORMAL
EXT URINE AMYLASE U/L: ABNORMAL
EXT VASOACTIVE INTESTINAL POLYPEPTIDE: ABNORMAL
EXT VITAMIN B12: ABNORMAL
EXT VMA 24 HR URINE: ABNORMAL
EXT WBC: 20.5 K/UL (ref 4.5–11)
NEURON SPECIFIC ENOLASE: ABNORMAL

## 2019-08-02 ENCOUNTER — TELEPHONE (OUTPATIENT)
Dept: NEUROLOGY | Facility: HOSPITAL | Age: 54
End: 2019-08-02

## 2019-08-02 NOTE — TELEPHONE ENCOUNTER
Called her phone    Talked to her daughter. She seems to be doing ok. Has pain. She also seems to be eating.    instructd to call for any problems

## 2019-08-08 ENCOUNTER — TELEPHONE (OUTPATIENT)
Dept: NEUROLOGY | Facility: HOSPITAL | Age: 54
End: 2019-08-08

## 2019-08-08 NOTE — TELEPHONE ENCOUNTER
----- Message from Norma Alfonso sent at 8/7/2019  1:07 PM CDT -----  Contact: [phone]-self  Refill request for:  HYDROcodone-acetaminophen (NORCO) 5-325 mg per tablet    Be sent to:  [id number] - [place], [place] - [address] AT [facility] [address]      Also would like a callback to schedule her post op appt. Please call.

## 2019-08-08 NOTE — TELEPHONE ENCOUNTER
Returned patients call after discussing case with Dr. Streeter. He wants to see the patient here in clinic on Monday 8/12/19 before refilling any medications. Patient was advised of recommendation and was scheduled for an appt for Monday. Patient verbalized back appointment date and time and has no further questions at this time.

## 2019-08-08 NOTE — TELEPHONE ENCOUNTER
----- Message from Romana Betancourt sent at 8/8/2019 10:20 AM CDT -----  Contact: self  PALMA - Patient is calling to schedule a follow up appoint after surgery and she need medicine for pain. Please call

## 2019-08-12 ENCOUNTER — OFFICE VISIT (OUTPATIENT)
Dept: NEUROLOGY | Facility: HOSPITAL | Age: 54
End: 2019-08-12
Attending: SURGERY
Payer: MEDICAID

## 2019-08-12 VITALS
HEIGHT: 64 IN | SYSTOLIC BLOOD PRESSURE: 127 MMHG | BODY MASS INDEX: 17 KG/M2 | TEMPERATURE: 97 F | DIASTOLIC BLOOD PRESSURE: 93 MMHG | HEART RATE: 96 BPM | WEIGHT: 99.56 LBS

## 2019-08-12 DIAGNOSIS — R10.84 GENERALIZED ABDOMINAL PAIN: Primary | ICD-10-CM

## 2019-08-12 DIAGNOSIS — R91.1 LUNG NODULE: ICD-10-CM

## 2019-08-12 PROCEDURE — 99213 OFFICE O/P EST LOW 20 MIN: CPT | Performed by: SURGERY

## 2019-08-12 RX ORDER — HYDROCODONE BITARTRATE AND ACETAMINOPHEN 5; 325 MG/1; MG/1
1 TABLET ORAL EVERY 6 HOURS PRN
Qty: 20 TABLET | Refills: 0 | OUTPATIENT
Start: 2019-08-12 | End: 2020-08-08

## 2019-08-12 NOTE — PROGRESS NOTES
C/o pain  Able to eat better no more diarrhea    abd soft wound looks good    C/o pain      Will repeat US duplex    Need to follow up with PCP    Will renew norco    Need pulmonary f/u for lung nodule    Any pain issues will be addressed by PCP or referral to pain clinic

## 2019-08-12 NOTE — PATIENT INSTRUCTIONS
Referral to Dr. Kendra Holley with pulmonology, staff will contact with appt.    Stat ultrasound scheduled today at 330pm.  If you cannot make this appt, please call [phone].    3 month follow up scheduled on Monday, November 11, 2019 at 1045am.

## 2019-12-10 ENCOUNTER — HOSPITAL ENCOUNTER (EMERGENCY)
Facility: HOSPITAL | Age: 54
Discharge: HOME | End: 2019-12-10
Attending: EMERGENCY MEDICINE
Payer: MEDICAID

## 2019-12-10 VITALS
SYSTOLIC BLOOD PRESSURE: 153 MMHG | RESPIRATION RATE: 18 BRPM | HEART RATE: 93 BPM | TEMPERATURE: 98 F | DIASTOLIC BLOOD PRESSURE: 98 MMHG | OXYGEN SATURATION: 99 %

## 2019-12-10 DIAGNOSIS — R11.2 NON-INTRACTABLE VOMITING WITH NAUSEA, UNSPECIFIED VOMITING TYPE: ICD-10-CM

## 2019-12-10 DIAGNOSIS — R10.30 LOWER ABDOMINAL PAIN: Primary | ICD-10-CM

## 2019-12-10 LAB
ALBUMIN SERPL BCP-MCNC: 4.4 G/DL (ref 3.5–5.2)
ALP SERPL-CCNC: 113 U/L (ref 55–135)
ALT SERPL W/O P-5'-P-CCNC: 20 U/L (ref 10–44)
AMPHET+METHAMPHET UR QL: NEGATIVE
ANION GAP SERPL CALC-SCNC: 16 MMOL/L (ref 8–16)
AST SERPL-CCNC: 23 U/L (ref 10–40)
BARBITURATES UR QL SCN>200 NG/ML: NEGATIVE
BASOPHILS # BLD AUTO: 0.01 K/UL (ref 0–0.2)
BASOPHILS NFR BLD: 0.1 % (ref 0–1.9)
BENZODIAZ UR QL SCN>200 NG/ML: NEGATIVE
BILIRUB SERPL-MCNC: 0.8 MG/DL (ref 0.1–1)
BILIRUB UR QL STRIP: NEGATIVE
BUN SERPL-MCNC: 10 MG/DL (ref 6–20)
BZE UR QL SCN: NEGATIVE
CALCIUM SERPL-MCNC: 11 MG/DL (ref 8.7–10.5)
CANNABINOIDS UR QL SCN: NEGATIVE
CHLORIDE SERPL-SCNC: 98 MMOL/L (ref 95–110)
CLARITY UR: CLEAR
CO2 SERPL-SCNC: 23 MMOL/L (ref 23–29)
COLOR UR: YELLOW
CREAT SERPL-MCNC: 1.1 MG/DL (ref 0.5–1.4)
CREAT UR-MCNC: 123.5 MG/DL (ref 15–325)
DIFFERENTIAL METHOD: ABNORMAL
EOSINOPHIL # BLD AUTO: 0 K/UL (ref 0–0.5)
EOSINOPHIL NFR BLD: 0 % (ref 0–8)
ERYTHROCYTE [DISTWIDTH] IN BLOOD BY AUTOMATED COUNT: 14.6 % (ref 11.5–14.5)
EST. GFR  (AFRICAN AMERICAN): >60 ML/MIN/1.73 M^2
EST. GFR  (NON AFRICAN AMERICAN): 56 ML/MIN/1.73 M^2
GLUCOSE SERPL-MCNC: 115 MG/DL (ref 70–110)
GLUCOSE UR QL STRIP: NEGATIVE
HCT VFR BLD AUTO: 50.2 % (ref 37–48.5)
HGB BLD-MCNC: 16.8 G/DL (ref 12–16)
HGB UR QL STRIP: ABNORMAL
KETONES UR QL STRIP: NEGATIVE
LACTATE SERPL-SCNC: 2.1 MMOL/L (ref 0.5–2.2)
LEUKOCYTE ESTERASE UR QL STRIP: NEGATIVE
LIPASE SERPL-CCNC: 19 U/L (ref 4–60)
LYMPHOCYTES # BLD AUTO: 2 K/UL (ref 1–4.8)
LYMPHOCYTES NFR BLD: 22.6 % (ref 18–48)
MCH RBC QN AUTO: 31.8 PG (ref 27–31)
MCHC RBC AUTO-ENTMCNC: 33.5 G/DL (ref 32–36)
MCV RBC AUTO: 95 FL (ref 82–98)
METHADONE UR QL SCN>300 NG/ML: NEGATIVE
MONOCYTES # BLD AUTO: 0.8 K/UL (ref 0.3–1)
MONOCYTES NFR BLD: 9.3 % (ref 4–15)
NEUTROPHILS # BLD AUTO: 6 K/UL (ref 1.8–7.7)
NEUTROPHILS NFR BLD: 68 % (ref 38–73)
NITRITE UR QL STRIP: NEGATIVE
OPIATES UR QL SCN: NORMAL
PCP UR QL SCN>25 NG/ML: NEGATIVE
PH UR STRIP: 7 [PH] (ref 5–8)
PLATELET # BLD AUTO: 316 K/UL (ref 150–350)
PMV BLD AUTO: 9.3 FL (ref 9.2–12.9)
POTASSIUM SERPL-SCNC: 3.7 MMOL/L (ref 3.5–5.1)
PROT SERPL-MCNC: 8.2 G/DL (ref 6–8.4)
PROT UR QL STRIP: ABNORMAL
RBC # BLD AUTO: 5.29 M/UL (ref 4–5.4)
SODIUM SERPL-SCNC: 137 MMOL/L (ref 136–145)
SP GR UR STRIP: 1.02 (ref 1–1.03)
TOXICOLOGY INFORMATION: NORMAL
URN SPEC COLLECT METH UR: ABNORMAL
UROBILINOGEN UR STRIP-ACNC: NEGATIVE EU/DL
WBC # BLD AUTO: 8.78 K/UL (ref 3.9–12.7)

## 2019-12-10 PROCEDURE — 96376 TX/PRO/DX INJ SAME DRUG ADON: CPT

## 2019-12-10 PROCEDURE — 80307 DRUG TEST PRSMV CHEM ANLYZR: CPT

## 2019-12-10 PROCEDURE — 96375 TX/PRO/DX INJ NEW DRUG ADDON: CPT

## 2019-12-10 PROCEDURE — 63600175 PHARM REV CODE 636 W HCPCS: Performed by: EMERGENCY MEDICINE

## 2019-12-10 PROCEDURE — 81003 URINALYSIS AUTO W/O SCOPE: CPT | Mod: 59

## 2019-12-10 PROCEDURE — 85025 COMPLETE CBC W/AUTO DIFF WBC: CPT

## 2019-12-10 PROCEDURE — 96361 HYDRATE IV INFUSION ADD-ON: CPT

## 2019-12-10 PROCEDURE — 96374 THER/PROPH/DIAG INJ IV PUSH: CPT

## 2019-12-10 PROCEDURE — 99284 EMERGENCY DEPT VISIT MOD MDM: CPT | Mod: 25

## 2019-12-10 PROCEDURE — 80053 COMPREHEN METABOLIC PANEL: CPT

## 2019-12-10 PROCEDURE — 83605 ASSAY OF LACTIC ACID: CPT

## 2019-12-10 PROCEDURE — 83690 ASSAY OF LIPASE: CPT

## 2019-12-10 RX ORDER — ONDANSETRON 4 MG/1
4 TABLET, ORALLY DISINTEGRATING ORAL EVERY 6 HOURS PRN
Qty: 10 TABLET | Refills: 0 | OUTPATIENT
Start: 2019-12-10 | End: 2020-08-08

## 2019-12-10 RX ORDER — LABETALOL HYDROCHLORIDE 5 MG/ML
20 INJECTION, SOLUTION INTRAVENOUS
Status: DISCONTINUED | OUTPATIENT
Start: 2019-12-10 | End: 2019-12-10 | Stop reason: HOSPADM

## 2019-12-10 RX ORDER — DIPHENHYDRAMINE HYDROCHLORIDE 50 MG/ML
25 INJECTION INTRAMUSCULAR; INTRAVENOUS
Status: COMPLETED | OUTPATIENT
Start: 2019-12-10 | End: 2019-12-10

## 2019-12-10 RX ORDER — METOCLOPRAMIDE HYDROCHLORIDE 5 MG/ML
10 INJECTION INTRAMUSCULAR; INTRAVENOUS
Status: COMPLETED | OUTPATIENT
Start: 2019-12-10 | End: 2019-12-10

## 2019-12-10 RX ORDER — DICYCLOMINE HYDROCHLORIDE 20 MG/1
20 TABLET ORAL 2 TIMES DAILY
Qty: 20 TABLET | Refills: 0 | Status: SHIPPED | OUTPATIENT
Start: 2019-12-10 | End: 2020-01-09

## 2019-12-10 RX ADMIN — LORAZEPAM 1 MG: 2 INJECTION INTRAMUSCULAR; INTRAVENOUS at 11:12

## 2019-12-10 RX ADMIN — SODIUM CHLORIDE 1000 ML: 0.9 INJECTION, SOLUTION INTRAVENOUS at 11:12

## 2019-12-10 RX ADMIN — DIPHENHYDRAMINE HYDROCHLORIDE 25 MG: 50 INJECTION, SOLUTION INTRAMUSCULAR; INTRAVENOUS at 11:12

## 2019-12-10 RX ADMIN — LORAZEPAM 0.5 MG: 2 INJECTION INTRAMUSCULAR; INTRAVENOUS at 03:12

## 2019-12-10 RX ADMIN — METOCLOPRAMIDE 10 MG: 5 INJECTION, SOLUTION INTRAMUSCULAR; INTRAVENOUS at 11:12

## 2019-12-10 NOTE — ED TRIAGE NOTES
Pt presents to ED via EMS with complaints of N/V & D that began last night. Pt states that she has not been able to eat. Pt states that her abdominal pain is a 10 out of 10. Pt states that she does have a history of abdominal problem. Pt denies any recent sick contacts. Pt is anxious and stating that she just wants the pain to stop.

## 2019-12-10 NOTE — ED NOTES
Pt resting comfortably in bed, chest rise and fall noted. Call light within reach, will continue to monitor.

## 2019-12-10 NOTE — ED PROVIDER NOTES
Encounter Date: 12/10/2019    SCRIBE #1 NOTE: I, Abdulaziz Joseph, am scribing for, and in the presence of, Paulo Kelsey DO.       History     Chief Complaint   Patient presents with    Abdominal Pain     chronic back pain increasing since last night     54 year-old female with history of HTN, COPD, presents to the ED via EMS with c/o acute on chronic abdominal pain starting last night but worse today. She admits to nonbloody N/V/D, weight loss, and decreased appetite. She has not had any sick contacts at home. She admits to smoking but does not drink alcohol or do any drugs. She reports history of abdominal stents.     The history is provided by the patient.     Review of patient's allergies indicates:   Allergen Reactions    Ultram [tramadol] Swelling     Past Medical History:   Diagnosis Date    Anxiety     COPD (chronic obstructive pulmonary disease)     Gastric ulcer, chronic     GERD (gastroesophageal reflux disease)     Hypercholesteremia     Hypertension      Past Surgical History:   Procedure Laterality Date    BACK SURGERY       No family history on file.  Social History     Tobacco Use    Smoking status: Current Every Day Smoker     Packs/day: 1.00     Years: 43.00     Pack years: 43.00     Types: Cigarettes   Substance Use Topics    Alcohol use: No    Drug use: Yes     Types: Marijuana     Comment: occasional     Review of Systems   Constitutional: Positive for appetite change and unexpected weight change. Negative for fever.   HENT: Negative for sore throat.    Respiratory: Negative for shortness of breath.    Cardiovascular: Negative for chest pain.   Gastrointestinal: Positive for abdominal pain, diarrhea, nausea and vomiting.   Genitourinary: Negative for dysuria.   Musculoskeletal: Negative for back pain.   Skin: Negative for rash.   Neurological: Negative for weakness.   Hematological: Does not bruise/bleed easily.   Psychiatric/Behavioral: The patient is nervous/anxious.    All other  systems reviewed and are negative.      Physical Exam     Initial Vitals [12/10/19 1029]   BP Pulse Resp Temp SpO2   (!) 156/121 (!) 114 20 99.1 °F (37.3 °C) 99 %      MAP       --         Physical Exam    Nursing note and vitals reviewed.  Constitutional: She appears well-developed and well-nourished. No distress.   HENT:   Head: Normocephalic and atraumatic.   Eyes: EOM are normal. Pupils are equal, round, and reactive to light.   Neck: Normal range of motion. Neck supple.   Cardiovascular: Regular rhythm, normal heart sounds and intact distal pulses. Tachycardia present.    Pulmonary/Chest: No respiratory distress. She has wheezes (expiratory).   No respiratory distress   Abdominal: Soft. Bowel sounds are normal. She exhibits no distension. There is no tenderness.   Musculoskeletal: Normal range of motion. She exhibits no edema.   Neurological: She is alert and oriented to person, place, and time.   Skin: Skin is warm and dry.   Psychiatric: Her mood appears anxious.         ED Course   Procedures  Labs Reviewed   CBC W/ AUTO DIFFERENTIAL - Abnormal; Notable for the following components:       Result Value    Hemoglobin 16.8 (*)     Hematocrit 50.2 (*)     Mean Corpuscular Hemoglobin 31.8 (*)     RDW 14.6 (*)     All other components within normal limits   COMPREHENSIVE METABOLIC PANEL - Abnormal; Notable for the following components:    Glucose 115 (*)     Calcium 11.0 (*)     eGFR if non  56 (*)     All other components within normal limits   URINALYSIS - Abnormal; Notable for the following components:    Protein, UA Trace (*)     Occult Blood UA Trace (*)     All other components within normal limits   LIPASE   LACTIC ACID, PLASMA   DRUG SCREEN PANEL, URINE EMERGENCY          Imaging Results    None          Medical Decision Making:   Initial Assessment:   Patient presents by ambulance with the exacerbation of her chronic abdominal pain.  She has been being seen by General surgery for this.   She has had failed attempts at the superior mesenteric artery stenosis.  Patient tells me she has been having vomiting as well as diarrhea.  Differential Diagnosis:   Differential diagnosis includes but is not limited to electrolyte abnormality bowel obstruction anxiety attack ischemia  Clinical Tests:   Lab Tests: Ordered and Reviewed  ED Management:  The patient was seen and examined.  She is quite anxious.  She is getting herself worked up.  She is tachycardic.  IV was established she was given analgesics as well as antiemetic medications.  I did review her past surgical note.              Attending Attestation:           Physician Attestation for Scribe:      Comments: I, Dr. Kelsey, personally performed the services described in this documentation. All medical record entries made by the scribe were at my direction and in my presence.  I have reviewed the chart and agree that the record reflects my personal performance and is accurate and complete. Paulo Kelsey DO  11:15 AM 12/10/2019                            Clinical Impression:       ICD-10-CM ICD-9-CM   1. Lower abdominal pain R10.30 789.09   2. Non-intractable vomiting with nausea, unspecified vomiting type R11.2 787.01       Disposition:   Disposition: Discharged  Condition: Stable                     Paulo Kelsey,   12/10/19 1614

## 2019-12-10 NOTE — ED NOTES
Pt attempted to urinate on bed pan, pt states she is unable to provide urine. Dr. Kelsey notified- bladder scan and if > than 50ml- in/out cath

## 2019-12-10 NOTE — ED NOTES
Dr. Kelsey at bedside; explained to pt that she needs to follow up with her primary MD for management of chronic pain.

## 2020-01-16 ENCOUNTER — HOSPITAL ENCOUNTER (EMERGENCY)
Facility: HOSPITAL | Age: 55
Discharge: HOME | End: 2020-01-16
Attending: EMERGENCY MEDICINE
Payer: MEDICAID

## 2020-01-16 VITALS
DIASTOLIC BLOOD PRESSURE: 88 MMHG | HEART RATE: 97 BPM | SYSTOLIC BLOOD PRESSURE: 177 MMHG | TEMPERATURE: 99 F | OXYGEN SATURATION: 98 % | HEIGHT: 64 IN | RESPIRATION RATE: 20 BRPM | BODY MASS INDEX: 17.07 KG/M2 | WEIGHT: 100 LBS

## 2020-01-16 DIAGNOSIS — I10 ESSENTIAL HYPERTENSION: ICD-10-CM

## 2020-01-16 DIAGNOSIS — R10.9 ABDOMINAL PAIN: ICD-10-CM

## 2020-01-16 DIAGNOSIS — R11.2 NON-INTRACTABLE VOMITING WITH NAUSEA, UNSPECIFIED VOMITING TYPE: ICD-10-CM

## 2020-01-16 DIAGNOSIS — R10.13 EPIGASTRIC PAIN: Primary | ICD-10-CM

## 2020-01-16 LAB
ALBUMIN SERPL BCP-MCNC: 4.3 G/DL (ref 3.5–5.2)
ALP SERPL-CCNC: 106 U/L (ref 55–135)
ALT SERPL W/O P-5'-P-CCNC: 16 U/L (ref 10–44)
AMPHET+METHAMPHET UR QL: NEGATIVE
ANION GAP SERPL CALC-SCNC: 13 MMOL/L (ref 8–16)
AST SERPL-CCNC: 20 U/L (ref 10–40)
BACTERIA #/AREA URNS HPF: NORMAL /HPF
BARBITURATES UR QL SCN>200 NG/ML: NEGATIVE
BASOPHILS # BLD AUTO: 0.02 K/UL (ref 0–0.2)
BASOPHILS NFR BLD: 0.2 % (ref 0–1.9)
BENZODIAZ UR QL SCN>200 NG/ML: NEGATIVE
BILIRUB SERPL-MCNC: 0.5 MG/DL (ref 0.1–1)
BILIRUB UR QL STRIP: NEGATIVE
BUN SERPL-MCNC: 14 MG/DL (ref 6–20)
BZE UR QL SCN: NEGATIVE
CALCIUM SERPL-MCNC: 10.6 MG/DL (ref 8.7–10.5)
CANNABINOIDS UR QL SCN: NEGATIVE
CHLORIDE SERPL-SCNC: 100 MMOL/L (ref 95–110)
CLARITY UR: CLEAR
CO2 SERPL-SCNC: 23 MMOL/L (ref 23–29)
COLOR UR: YELLOW
CREAT SERPL-MCNC: 1 MG/DL (ref 0.5–1.4)
CREAT UR-MCNC: 88.4 MG/DL (ref 15–325)
DIFFERENTIAL METHOD: ABNORMAL
EOSINOPHIL # BLD AUTO: 0 K/UL (ref 0–0.5)
EOSINOPHIL NFR BLD: 0.2 % (ref 0–8)
ERYTHROCYTE [DISTWIDTH] IN BLOOD BY AUTOMATED COUNT: 15.6 % (ref 11.5–14.5)
EST. GFR  (AFRICAN AMERICAN): >60 ML/MIN/1.73 M^2
EST. GFR  (NON AFRICAN AMERICAN): >60 ML/MIN/1.73 M^2
GLUCOSE SERPL-MCNC: 166 MG/DL (ref 70–110)
GLUCOSE UR QL STRIP: NEGATIVE
HCT VFR BLD AUTO: 50.4 % (ref 37–48.5)
HGB BLD-MCNC: 17 G/DL (ref 12–16)
HGB UR QL STRIP: ABNORMAL
HYALINE CASTS #/AREA URNS LPF: 1 /LPF
KETONES UR QL STRIP: NEGATIVE
LEUKOCYTE ESTERASE UR QL STRIP: NEGATIVE
LIPASE SERPL-CCNC: 51 U/L (ref 4–60)
LYMPHOCYTES # BLD AUTO: 1.5 K/UL (ref 1–4.8)
LYMPHOCYTES NFR BLD: 17 % (ref 18–48)
MCH RBC QN AUTO: 32.3 PG (ref 27–31)
MCHC RBC AUTO-ENTMCNC: 33.7 G/DL (ref 32–36)
MCV RBC AUTO: 96 FL (ref 82–98)
METHADONE UR QL SCN>300 NG/ML: NEGATIVE
MICROSCOPIC COMMENT: NORMAL
MONOCYTES # BLD AUTO: 0.8 K/UL (ref 0.3–1)
MONOCYTES NFR BLD: 8.7 % (ref 4–15)
NEUTROPHILS # BLD AUTO: 6.5 K/UL (ref 1.8–7.7)
NEUTROPHILS NFR BLD: 73.9 % (ref 38–73)
NITRITE UR QL STRIP: NEGATIVE
OPIATES UR QL SCN: NORMAL
PCP UR QL SCN>25 NG/ML: NEGATIVE
PH UR STRIP: 6 [PH] (ref 5–8)
PLATELET # BLD AUTO: 377 K/UL (ref 150–350)
PMV BLD AUTO: 9.2 FL (ref 9.2–12.9)
POTASSIUM SERPL-SCNC: 3.8 MMOL/L (ref 3.5–5.1)
PROT SERPL-MCNC: 8.2 G/DL (ref 6–8.4)
PROT UR QL STRIP: ABNORMAL
RBC # BLD AUTO: 5.26 M/UL (ref 4–5.4)
RBC #/AREA URNS HPF: 2 /HPF (ref 0–4)
SODIUM SERPL-SCNC: 136 MMOL/L (ref 136–145)
SP GR UR STRIP: >=1.03 (ref 1–1.03)
TOXICOLOGY INFORMATION: NORMAL
URN SPEC COLLECT METH UR: ABNORMAL
UROBILINOGEN UR STRIP-ACNC: NEGATIVE EU/DL
WBC # BLD AUTO: 8.76 K/UL (ref 3.9–12.7)
WBC #/AREA URNS HPF: 2 /HPF (ref 0–5)

## 2020-01-16 PROCEDURE — 63600175 PHARM REV CODE 636 W HCPCS: Performed by: EMERGENCY MEDICINE

## 2020-01-16 PROCEDURE — 93005 ELECTROCARDIOGRAM TRACING: CPT

## 2020-01-16 PROCEDURE — 81000 URINALYSIS NONAUTO W/SCOPE: CPT | Mod: 59

## 2020-01-16 PROCEDURE — 83690 ASSAY OF LIPASE: CPT

## 2020-01-16 PROCEDURE — 96361 HYDRATE IV INFUSION ADD-ON: CPT

## 2020-01-16 PROCEDURE — 80053 COMPREHEN METABOLIC PANEL: CPT

## 2020-01-16 PROCEDURE — 96375 TX/PRO/DX INJ NEW DRUG ADDON: CPT | Mod: 59

## 2020-01-16 PROCEDURE — C9113 INJ PANTOPRAZOLE SODIUM, VIA: HCPCS | Performed by: EMERGENCY MEDICINE

## 2020-01-16 PROCEDURE — 93010 EKG 12-LEAD: ICD-10-PCS | Mod: ,,, | Performed by: INTERNAL MEDICINE

## 2020-01-16 PROCEDURE — 85025 COMPLETE CBC W/AUTO DIFF WBC: CPT

## 2020-01-16 PROCEDURE — 93010 ELECTROCARDIOGRAM REPORT: CPT | Mod: ,,, | Performed by: INTERNAL MEDICINE

## 2020-01-16 PROCEDURE — 99285 EMERGENCY DEPT VISIT HI MDM: CPT | Mod: 25

## 2020-01-16 PROCEDURE — 25000003 PHARM REV CODE 250: Performed by: EMERGENCY MEDICINE

## 2020-01-16 PROCEDURE — 96374 THER/PROPH/DIAG INJ IV PUSH: CPT

## 2020-01-16 PROCEDURE — 80307 DRUG TEST PRSMV CHEM ANLYZR: CPT

## 2020-01-16 RX ORDER — KETOROLAC TROMETHAMINE 30 MG/ML
15 INJECTION, SOLUTION INTRAMUSCULAR; INTRAVENOUS
Status: COMPLETED | OUTPATIENT
Start: 2020-01-16 | End: 2020-01-16

## 2020-01-16 RX ORDER — PROCHLORPERAZINE MALEATE 10 MG
10 TABLET ORAL EVERY 8 HOURS PRN
Qty: 14 TABLET | Refills: 0 | Status: SHIPPED | OUTPATIENT
Start: 2020-01-16

## 2020-01-16 RX ORDER — HALOPERIDOL 5 MG/ML
5 INJECTION INTRAMUSCULAR
Status: COMPLETED | OUTPATIENT
Start: 2020-01-16 | End: 2020-01-16

## 2020-01-16 RX ORDER — AMLODIPINE BESYLATE 10 MG/1
5 TABLET ORAL DAILY
Qty: 30 TABLET | Refills: 0 | Status: SHIPPED | OUTPATIENT
Start: 2020-01-16

## 2020-01-16 RX ORDER — ONDANSETRON 2 MG/ML
4 INJECTION INTRAMUSCULAR; INTRAVENOUS
Status: COMPLETED | OUTPATIENT
Start: 2020-01-16 | End: 2020-01-16

## 2020-01-16 RX ORDER — AMLODIPINE BESYLATE 5 MG/1
10 TABLET ORAL
Status: COMPLETED | OUTPATIENT
Start: 2020-01-16 | End: 2020-01-16

## 2020-01-16 RX ORDER — ESOMEPRAZOLE MAGNESIUM 20 MG/1
20 TABLET, DELAYED RELEASE ORAL DAILY
Qty: 30 TABLET | Refills: 0 | Status: SHIPPED | OUTPATIENT
Start: 2020-01-16

## 2020-01-16 RX ORDER — DICYCLOMINE HYDROCHLORIDE 20 MG/1
20 TABLET ORAL 3 TIMES DAILY PRN
Qty: 14 TABLET | Refills: 0 | Status: SHIPPED | OUTPATIENT
Start: 2020-01-16 | End: 2020-02-15

## 2020-01-16 RX ORDER — PANTOPRAZOLE SODIUM 40 MG/10ML
40 INJECTION, POWDER, LYOPHILIZED, FOR SOLUTION INTRAVENOUS
Status: COMPLETED | OUTPATIENT
Start: 2020-01-16 | End: 2020-01-16

## 2020-01-16 RX ADMIN — ONDANSETRON 4 MG: 2 INJECTION INTRAMUSCULAR; INTRAVENOUS at 06:01

## 2020-01-16 RX ADMIN — PANTOPRAZOLE SODIUM 40 MG: 40 INJECTION, POWDER, LYOPHILIZED, FOR SOLUTION INTRAVENOUS at 08:01

## 2020-01-16 RX ADMIN — LIDOCAINE HYDROCHLORIDE: 20 SOLUTION ORAL; TOPICAL at 07:01

## 2020-01-16 RX ADMIN — HALOPERIDOL LACTATE 5 MG: 5 INJECTION, SOLUTION INTRAMUSCULAR at 06:01

## 2020-01-16 RX ADMIN — KETOROLAC TROMETHAMINE 15 MG: 30 INJECTION, SOLUTION INTRAMUSCULAR at 07:01

## 2020-01-16 RX ADMIN — AMLODIPINE BESYLATE 10 MG: 5 TABLET ORAL at 07:01

## 2020-01-16 RX ADMIN — SODIUM CHLORIDE 1000 ML: 0.9 INJECTION, SOLUTION INTRAVENOUS at 06:01

## 2020-01-16 NOTE — ED NOTES
APPEARANCE: Alert, oriented and in no acute distress.  CARDIAC: Normal rate and rhythm, no murmur heard.   PERIPHERAL VASCULAR: peripheral pulses present. Normal cap refill. No edema. Warm to touch.    RESPIRATORY:Normal rate and effort, breath sounds clear bilaterally throughout chest. Respirations are equal and unlabored no obvious signs of distress.  MUSC: Full ROM. No bony tenderness or soft tissue tenderness. No obvious deformity.  SKIN: Skin is warm and dry, normal skin turgor, mucous membranes moist.  NEURO: 5/5 strength major flexors/extensors bilaterally. Sensory intact to light touch bilaterally. [place] coma scale: eyes open spontaneously-4, oriented & converses-5, obeys commands-6. No neurological abnormalities.   MENTAL STATUS: awake, alert and aware of environment.  EYE: PERRL, both eyes: pupils brisk and reactive to light. Normal size.  ENT: EARS: no obvious drainage. NOSE: no active bleeding.

## 2020-01-16 NOTE — ED TRIAGE NOTES
Patient brought In by EMS for complaints of N/V since midnight. Patient has history of abdominal surgeries. States pain has been worse since surgery last year. Patient is crying and dry heaving. BP is elevated. States she has not taken any BP medication in one month due to not having a ride.     Review of patient's allergies indicates:   Allergen Reactions    Ultram [tramadol] Swelling        Patient has verified the spelling of their name and  on armband.   APPEARANCE: Patient is alert, calm, oriented x 4, and is crying  SKIN: Skin is normal for race, warm, and dry. Normal skin turgor and mucous membranes moist.  CARDIAC: Normal rate and rhythm, no murmur heard. +HTN  RESPIRATORY:Normal rate and effort. Breath sounds clear bilaterally throughout chest. Respirations are equal and unlabored.    GASTRO: Bowel sounds normal, abdomen is soft, no tenderness, and no abdominal distention. +epigastric pain that radiates up +N/V   MUSCLE: Full ROM. No bony tenderness or soft tissue tenderness. No obvious deformity.

## 2020-01-16 NOTE — ED NOTES
Patient requested more blanket and lights turned off. Let her know second xray should result within 30 minutes.

## 2020-01-16 NOTE — ED NOTES
Patient care assumed report received from hilda sood patient awake alert ox4 in no acute distress patient complains of general abdominal pain at this time patient ambulatory to ed bed with steady gait patient updated on plan of care nurse will continue to monitor.

## 2020-01-16 NOTE — ED PROVIDER NOTES
Encounter Date: 1/16/2020       History     Chief Complaint   Patient presents with    Abdominal Pain     To ER per [facility] with c/o abd pain and vomiting x 2 starting today.  Pt hypertensive and states that she has been out of her medication x 1 month.     54-year-old female brought to the emergency department by EMS for nausea, vomiting, abdominal pain. On some less night.  Reports mostly midepigastric burning and throbbing pain that is worse with vomiting without alleviating factors.  Notes she has had similar symptoms in the past.  Denies any constipation or diarrhea but states the diarrhea will probably come later.  States this is similar to constellation of symptoms she has experienced before.  Reports emesis is nonbloody and nonbilious.  Denies any fever, chest pain, shortness of breath, headache. Denies any urinary symptoms. Reports several episodes of emesis prior to arrival.  No other symptoms reported at this time.        Review of patient's allergies indicates:   Allergen Reactions    Ultram [tramadol] Swelling     Past Medical History:   Diagnosis Date    Anxiety     COPD (chronic obstructive pulmonary disease)     Gastric ulcer, chronic     GERD (gastroesophageal reflux disease)     Hypercholesteremia     Hypertension      Past Surgical History:   Procedure Laterality Date    BACK SURGERY       History reviewed. No pertinent family history.  Social History     Tobacco Use    Smoking status: Current Every Day Smoker     Packs/day: 1.00     Years: 43.00     Pack years: 43.00     Types: Cigarettes   Substance Use Topics    Alcohol use: No    Drug use: Yes     Types: Marijuana     Comment: occasional     Review of Systems   Constitutional: Negative for chills, fatigue and fever.   HENT: Negative for congestion, sore throat and voice change.    Eyes: Negative for photophobia, pain and redness.   Respiratory: Negative for cough, choking and shortness of breath.    Cardiovascular: Negative for  chest pain, palpitations and leg swelling.   Gastrointestinal: Positive for abdominal pain, nausea and vomiting. Negative for diarrhea.   Genitourinary: Negative for dysuria, frequency and urgency.   Musculoskeletal: Negative for back pain, neck pain and neck stiffness.   Neurological: Negative for light-headedness, numbness and headaches.   All other systems reviewed and are negative.      Physical Exam     Initial Vitals [01/16/20 0537]   BP Pulse Resp Temp SpO2   (!) 199/112 107 18 97.8 °F (36.6 °C) 95 %      MAP       --         Physical Exam    Nursing note and vitals reviewed.  Constitutional: She appears well-developed and well-nourished. No distress.   HENT:   Head: Normocephalic and atraumatic.   Oropharynx clear; Dry MM    Eyes: Conjunctivae and EOM are normal. Pupils are equal, round, and reactive to light.   Neck: Normal range of motion. Neck supple. No tracheal deviation present.   Cardiovascular: Normal rate, regular rhythm, normal heart sounds and intact distal pulses.   Pulmonary/Chest: No respiratory distress. She has wheezes (Scattered end expiratory wheezing noted). She has no rhonchi. She has no rales.   Abdominal: Soft. Bowel sounds are normal. She exhibits no distension. There is tenderness (Midepigastric). There is guarding (Voluntary). There is no rebound.   Musculoskeletal: Normal range of motion. She exhibits no edema or tenderness.   Neurological: She is alert and oriented to person, place, and time. She has normal strength. No cranial nerve deficit.   Skin: Skin is warm and dry.         ED Course   Procedures  Labs Reviewed   CBC W/ AUTO DIFFERENTIAL - Abnormal; Notable for the following components:       Result Value    Hemoglobin 17.0 (*)     Hematocrit 50.4 (*)     Mean Corpuscular Hemoglobin 32.3 (*)     RDW 15.6 (*)     Platelets 377 (*)     Gran% 73.9 (*)     Lymph% 17.0 (*)     All other components within normal limits   COMPREHENSIVE METABOLIC PANEL - Abnormal; Notable for the  following components:    Glucose 166 (*)     Calcium 10.6 (*)     All other components within normal limits   LIPASE   URINALYSIS   DRUG SCREEN PANEL, URINE EMERGENCY     EKG Readings: (Independently Interpreted)   Initial Reading: No STEMI. Previous EKG: Compared with most recent EKG Rhythm: Normal Sinus Rhythm. Heart Rate: 98. Ectopy: No Ectopy. Conduction: RBBB. ST Segments: Normal ST Segments. Axis: Normal.         X-Rays:   Independently Interpreted Readings:   Other Readings:  Imaging interpreted by radiologist and visualized by me:    Imaging Results          X-Ray Abdomen AP 1 View (KUB) (Final result)  Result time 01/16/20 07:12:07    Final result by Alecia Morgan MD (01/16/20 07:12:07)                 Impression:      Please see above.      Electronically signed by: Alecia Morgan MD  Date:    01/16/2020  Time:    07:12             Narrative:    EXAMINATION:  XR ABDOMEN AP 1 VIEW    CLINICAL HISTORY:  Unspecified abdominal pain    TECHNIQUE:  AP View(s) of the abdomen was performed.    COMPARISON:  None    FINDINGS:  Scattered air is seen throughout nondilated loops of large and small bowel.  Limited evaluation of free intraperitoneal air due to technique.  Visualized osseous structures are intact with degenerative change of the lower lumbar spine.  Small calcifications in the pelvis likely reflect phleboliths.  The visualized lung bases are grossly clear.                               X-Ray Chest AP Portable (Final result)  Result time 01/16/20 06:41:58    Final result by Alecia Morgan MD (01/16/20 06:41:58)                 Impression:      Stable chronic findings without evidence of superimposed acute cardiopulmonary process on this single radiographic view of the chest.      Electronically signed by: Alecia Morgan MD  Date:    01/16/2020  Time:    06:41             Narrative:    EXAMINATION:  XR CHEST AP PORTABLE    CLINICAL HISTORY:  Abdominal pain;    TECHNIQUE:  Single frontal view of the chest was  performed.    COMPARISON:  01/15/2019    FINDINGS:  Cardiac monitoring leads overlie the chest.  The cardiomediastinal silhouette is within normal limits.  The lungs appear hyperinflated with diffuse coarse interstitial attenuation likely reflecting underlying COPD/emphysema.  The previously identified opacity in the right upper lung seen on prior CT examination is not as well appreciated on today's study.  Please refer to follow-up recommendations in the dictated report for that exam.  There is no focal alveolar consolidation, significant volume of pleural fluid or pneumothorax.  Visualized osseous structures are intact.                                Medical Decision Making:   Initial Assessment:   54-year-old female presents emergency department complaining of nausea, vomiting, abdominal pain  Differential Diagnosis:   Diverticulitis, gallstones, pancreatitis, appendicitis, obstruction, constipation, UTI, pyelonephritis, kidney stone, gastroenteritis  Independently Interpreted Test(s):   I have ordered and independently interpreted X-rays - see prior notes.  I have ordered and independently interpreted EKG Reading(s) - see prior notes  Clinical Tests:   Lab Tests: Reviewed       <> Summary of Lab: Benign  ED Management:  Patient given IV fluid, Zofran, Haldol, Toradol, GI cocktail, Protonix IV.  Her heart rate has improved, and her blood pressures improved with some amlodipine.  Admits she has been out of her blood pressure medications.  Her abdominal pain is improved although not entirely resolved.  Informed her results as well as plan for discharge with prescriptions for Bentyl, Compazine, amlodipine, and Nexium, strict return precautions given.  Instructed on home management and follow up with primary care physician.                                 Clinical Impression:       ICD-10-CM ICD-9-CM   1. Epigastric pain R10.13 789.06   2. Abdominal pain R10.9 789.00   3. Non-intractable vomiting with nausea,  unspecified vomiting type R11.2 787.01   4. Essential hypertension I10 401.9         Disposition:   Disposition: Discharged  Condition: Stable                     Agapito Jessica MD  01/16/20 0904

## 2020-05-26 ENCOUNTER — HOSPITAL ENCOUNTER (EMERGENCY)
Facility: HOSPITAL | Age: 55
Discharge: HOME | End: 2020-05-26
Attending: EMERGENCY MEDICINE
Payer: MEDICAID

## 2020-05-26 VITALS
TEMPERATURE: 98 F | SYSTOLIC BLOOD PRESSURE: 125 MMHG | HEART RATE: 92 BPM | RESPIRATION RATE: 20 BRPM | DIASTOLIC BLOOD PRESSURE: 74 MMHG | OXYGEN SATURATION: 97 %

## 2020-05-26 DIAGNOSIS — R07.9 CHEST PAIN: ICD-10-CM

## 2020-05-26 DIAGNOSIS — R10.9 ABDOMINAL PAIN, UNSPECIFIED ABDOMINAL LOCATION: Primary | ICD-10-CM

## 2020-05-26 LAB
ALBUMIN SERPL BCP-MCNC: 4 G/DL (ref 3.5–5.2)
ALP SERPL-CCNC: 84 U/L (ref 55–135)
ALT SERPL W/O P-5'-P-CCNC: 22 U/L (ref 10–44)
AMPHET+METHAMPHET UR QL: NEGATIVE
ANION GAP SERPL CALC-SCNC: 11 MMOL/L (ref 8–16)
AST SERPL-CCNC: 21 U/L (ref 10–40)
BARBITURATES UR QL SCN>200 NG/ML: NEGATIVE
BASOPHILS # BLD AUTO: 0.02 K/UL (ref 0–0.2)
BASOPHILS NFR BLD: 0.2 % (ref 0–1.9)
BENZODIAZ UR QL SCN>200 NG/ML: NEGATIVE
BILIRUB SERPL-MCNC: 0.6 MG/DL (ref 0.1–1)
BILIRUB UR QL STRIP: ABNORMAL
BNP SERPL-MCNC: 60 PG/ML (ref 0–99)
BUN SERPL-MCNC: 13 MG/DL (ref 6–20)
BZE UR QL SCN: NEGATIVE
CALCIUM SERPL-MCNC: 9.6 MG/DL (ref 8.7–10.5)
CANNABINOIDS UR QL SCN: NEGATIVE
CHLORIDE SERPL-SCNC: 94 MMOL/L (ref 95–110)
CLARITY UR: CLEAR
CO2 SERPL-SCNC: 30 MMOL/L (ref 23–29)
COLOR UR: YELLOW
CREAT SERPL-MCNC: 0.8 MG/DL (ref 0.5–1.4)
CREAT UR-MCNC: 247.4 MG/DL (ref 15–325)
DIFFERENTIAL METHOD: ABNORMAL
EOSINOPHIL # BLD AUTO: 0.1 K/UL (ref 0–0.5)
EOSINOPHIL NFR BLD: 0.5 % (ref 0–8)
ERYTHROCYTE [DISTWIDTH] IN BLOOD BY AUTOMATED COUNT: 12.9 % (ref 11.5–14.5)
EST. GFR  (AFRICAN AMERICAN): >60 ML/MIN/1.73 M^2
EST. GFR  (NON AFRICAN AMERICAN): >60 ML/MIN/1.73 M^2
GLUCOSE SERPL-MCNC: 111 MG/DL (ref 70–110)
GLUCOSE UR QL STRIP: NEGATIVE
HCT VFR BLD AUTO: 48.7 % (ref 37–48.5)
HGB BLD-MCNC: 16.2 G/DL (ref 12–16)
HGB UR QL STRIP: NEGATIVE
IMM GRANULOCYTES # BLD AUTO: 0.04 K/UL (ref 0–0.04)
IMM GRANULOCYTES NFR BLD AUTO: 0.4 % (ref 0–0.5)
KETONES UR QL STRIP: ABNORMAL
LEUKOCYTE ESTERASE UR QL STRIP: NEGATIVE
LYMPHOCYTES # BLD AUTO: 3 K/UL (ref 1–4.8)
LYMPHOCYTES NFR BLD: 30.5 % (ref 18–48)
MAGNESIUM SERPL-MCNC: 2.4 MG/DL (ref 1.6–2.6)
MCH RBC QN AUTO: 31 PG (ref 27–31)
MCHC RBC AUTO-ENTMCNC: 33.3 G/DL (ref 32–36)
MCV RBC AUTO: 93 FL (ref 82–98)
METHADONE UR QL SCN>300 NG/ML: NEGATIVE
MONOCYTES # BLD AUTO: 1.1 K/UL (ref 0.3–1)
MONOCYTES NFR BLD: 10.8 % (ref 4–15)
NEUTROPHILS # BLD AUTO: 5.7 K/UL (ref 1.8–7.7)
NEUTROPHILS NFR BLD: 57.6 % (ref 38–73)
NITRITE UR QL STRIP: NEGATIVE
NRBC BLD-RTO: 0 /100 WBC
OPIATES UR QL SCN: NORMAL
PCP UR QL SCN>25 NG/ML: NEGATIVE
PH UR STRIP: 7 [PH] (ref 5–8)
PLATELET # BLD AUTO: 381 K/UL (ref 150–350)
PMV BLD AUTO: 9.4 FL (ref 9.2–12.9)
POTASSIUM SERPL-SCNC: 3.3 MMOL/L (ref 3.5–5.1)
PROT SERPL-MCNC: 7.3 G/DL (ref 6–8.4)
PROT UR QL STRIP: NEGATIVE
RBC # BLD AUTO: 5.23 M/UL (ref 4–5.4)
SODIUM SERPL-SCNC: 135 MMOL/L (ref 136–145)
SP GR UR STRIP: 1.02 (ref 1–1.03)
TOXICOLOGY INFORMATION: NORMAL
TROPONIN I SERPL DL<=0.01 NG/ML-MCNC: <0.006 NG/ML (ref 0–0.03)
TROPONIN I SERPL DL<=0.01 NG/ML-MCNC: <0.006 NG/ML (ref 0–0.03)
URN SPEC COLLECT METH UR: ABNORMAL
UROBILINOGEN UR STRIP-ACNC: 1 EU/DL
WBC # BLD AUTO: 9.82 K/UL (ref 3.9–12.7)

## 2020-05-26 PROCEDURE — 93005 ELECTROCARDIOGRAM TRACING: CPT

## 2020-05-26 PROCEDURE — 99285 EMERGENCY DEPT VISIT HI MDM: CPT | Mod: 25

## 2020-05-26 PROCEDURE — 25000003 PHARM REV CODE 250: Performed by: NURSE PRACTITIONER

## 2020-05-26 PROCEDURE — 81003 URINALYSIS AUTO W/O SCOPE: CPT | Mod: 59

## 2020-05-26 PROCEDURE — 83880 ASSAY OF NATRIURETIC PEPTIDE: CPT

## 2020-05-26 PROCEDURE — 80307 DRUG TEST PRSMV CHEM ANLYZR: CPT

## 2020-05-26 PROCEDURE — 83735 ASSAY OF MAGNESIUM: CPT

## 2020-05-26 PROCEDURE — 85025 COMPLETE CBC W/AUTO DIFF WBC: CPT

## 2020-05-26 PROCEDURE — 80053 COMPREHEN METABOLIC PANEL: CPT

## 2020-05-26 PROCEDURE — 84484 ASSAY OF TROPONIN QUANT: CPT | Mod: 91

## 2020-05-26 RX ORDER — PANTOPRAZOLE SODIUM 40 MG/1
40 TABLET, DELAYED RELEASE ORAL
Status: COMPLETED | OUTPATIENT
Start: 2020-05-26 | End: 2020-05-26

## 2020-05-26 RX ORDER — ONDANSETRON 4 MG/1
4 TABLET, ORALLY DISINTEGRATING ORAL
Status: COMPLETED | OUTPATIENT
Start: 2020-05-26 | End: 2020-05-26

## 2020-05-26 RX ORDER — LIDOCAINE HYDROCHLORIDE 20 MG/ML
10 SOLUTION OROPHARYNGEAL ONCE
Status: COMPLETED | OUTPATIENT
Start: 2020-05-26 | End: 2020-05-26

## 2020-05-26 RX ORDER — DICYCLOMINE HYDROCHLORIDE 10 MG/1
10 CAPSULE ORAL ONCE
Status: COMPLETED | OUTPATIENT
Start: 2020-05-26 | End: 2020-05-26

## 2020-05-26 RX ORDER — MAG HYDROX/ALUMINUM HYD/SIMETH 200-200-20
30 SUSPENSION, ORAL (FINAL DOSE FORM) ORAL ONCE
Status: COMPLETED | OUTPATIENT
Start: 2020-05-26 | End: 2020-05-26

## 2020-05-26 RX ORDER — KETOROLAC TROMETHAMINE 30 MG/ML
15 INJECTION, SOLUTION INTRAMUSCULAR; INTRAVENOUS
Status: DISCONTINUED | OUTPATIENT
Start: 2020-05-26 | End: 2020-05-26

## 2020-05-26 RX ORDER — LIDOCAINE HYDROCHLORIDE 20 MG/ML
10 SOLUTION OROPHARYNGEAL ONCE
Status: DISCONTINUED | OUTPATIENT
Start: 2020-05-26 | End: 2020-05-26

## 2020-05-26 RX ORDER — HYDROCODONE BITARTRATE AND ACETAMINOPHEN 5; 325 MG/1; MG/1
1 TABLET ORAL
Status: COMPLETED | OUTPATIENT
Start: 2020-05-26 | End: 2020-05-26

## 2020-05-26 RX ADMIN — ALUMINUM HYDROXIDE, MAGNESIUM HYDROXIDE, AND SIMETHICONE 30 ML: 200; 200; 20 SUSPENSION ORAL at 09:05

## 2020-05-26 RX ADMIN — LIDOCAINE HYDROCHLORIDE 10 ML: 20 SOLUTION ORAL; TOPICAL at 09:05

## 2020-05-26 RX ADMIN — PANTOPRAZOLE SODIUM 40 MG: 40 TABLET, DELAYED RELEASE ORAL at 12:05

## 2020-05-26 RX ADMIN — DICYCLOMINE HYDROCHLORIDE 10 MG: 10 CAPSULE ORAL at 09:05

## 2020-05-26 RX ADMIN — ONDANSETRON 4 MG: 4 TABLET, ORALLY DISINTEGRATING ORAL at 08:05

## 2020-05-26 RX ADMIN — HYDROCODONE BITARTRATE AND ACETAMINOPHEN 1 TABLET: 5; 325 TABLET ORAL at 12:05

## 2020-05-26 NOTE — ED NOTES
"Pt is emotional and crying, states her pain has not been relieved with the GI cocktail. Pt states her pain continues to be stabbing in nature and 8/10. Pt states she is not nauseated however she "feels like she might be again soon". Call bell in reach.   "

## 2020-05-26 NOTE — DISCHARGE INSTRUCTIONS
Please follow-up with Dr. Bryant your gastroenterologist please make an appointment in 1-2 days for follow-up.

## 2020-05-26 NOTE — ED PROVIDER NOTES
Encounter Date: 5/26/2020       History     Chief Complaint   Patient presents with    Chest Pain     Chest pain / epigastric pain with N/V x 2 days. States pain is sharp, worse with deep breath. Reports SOB and cough - no fever. States using duonebs at home with minimal relief. Presents awake, alert, oriented. Mild dyspnea noted. No distress.      54-year-old female presents to the emergency room she states times 1 year she has had epigastric and chest pain on and off.   Patient states  Chest pain 8/10 in the emergency room but patient points to area of pain is epigastric. Patient states starting yesterday with nausea vomiting.  She denies constipation or diarrhea .   She states that she has a history of COPD  with shortness of breath intermittently and has used her duo nebs with minimal relief.  Patient does states she is under a considerable amount of stress due to her youngest daughter was living with her with her 1-month-old the patient has placed them out yesterday as it was getting to be too stressful at home for her.      patient past medical history: Anxiety ,COPD,Gastric ulcer, chronic,GERD ,Hypercholesteremia,Hypertension              Review of patient's allergies indicates:   Allergen Reactions    Ultram [tramadol] Swelling     Past Medical History:   Diagnosis Date    Anxiety     COPD (chronic obstructive pulmonary disease)     Gastric ulcer, chronic     GERD (gastroesophageal reflux disease)     Hypercholesteremia     Hypertension      Past Surgical History:   Procedure Laterality Date    BACK SURGERY       History reviewed. No pertinent family history.  Social History     Tobacco Use    Smoking status: Current Every Day Smoker     Packs/day: 1.00     Years: 43.00     Pack years: 43.00     Types: Cigarettes   Substance Use Topics    Alcohol use: No    Drug use: Yes     Types: Marijuana     Comment: occasional     Review of Systems   Constitutional: Positive for fatigue. Negative for  activity change, appetite change and fever.   HENT: Negative for sore throat.    Respiratory: Positive for shortness of breath. Negative for cough.         COPD   Cardiovascular: Negative for chest pain.   Gastrointestinal: Positive for abdominal pain and nausea. Negative for abdominal distention, blood in stool, constipation, diarrhea, rectal pain and vomiting.   Genitourinary: Negative for dysuria.   Musculoskeletal: Negative for back pain.   Skin: Negative for rash.   Neurological: Negative for dizziness, weakness, numbness and headaches.   Hematological: Does not bruise/bleed easily.   Psychiatric/Behavioral: Negative for agitation.   All other systems reviewed and are negative.      Physical Exam     Initial Vitals   BP Pulse Resp Temp SpO2   05/26/20 0902 05/26/20 0902 05/26/20 0902 05/26/20 1332 05/26/20 0902   100/71 97 (!) 24 98.4 °F (36.9 °C) 99 %      MAP       --                Physical Exam    Nursing note and vitals reviewed.  Constitutional: She appears well-developed and well-nourished.   HENT:   Head: Normocephalic.   Neck: Normal range of motion.   Cardiovascular: Normal rate, regular rhythm, normal heart sounds and intact distal pulses. Exam reveals no gallop and no friction rub.    No murmur heard.  Pulmonary/Chest: Breath sounds normal.   Abdominal: Soft. Bowel sounds are normal. She exhibits no distension and no mass. There is no tenderness. There is no rebound and no guarding.   Musculoskeletal: She exhibits no edema or tenderness.   Neurological: She is alert and oriented to person, place, and time.   Skin: Skin is warm and dry. Capillary refill takes less than 2 seconds.   Psychiatric: She has a normal mood and affect. Thought content normal.         ED Course   Procedures  Labs Reviewed   CBC W/ AUTO DIFFERENTIAL - Abnormal; Notable for the following components:       Result Value    Hemoglobin 16.2 (*)     Hematocrit 48.7 (*)     Platelets 381 (*)     Mono # 1.1 (*)     All other  components within normal limits   COMPREHENSIVE METABOLIC PANEL - Abnormal; Notable for the following components:    Sodium 135 (*)     Potassium 3.3 (*)     Chloride 94 (*)     CO2 30 (*)     Glucose 111 (*)     All other components within normal limits   URINALYSIS, REFLEX TO URINE CULTURE - Abnormal; Notable for the following components:    Ketones, UA 1+ (*)     Bilirubin (UA) 2+ (*)     All other components within normal limits    Narrative:     Preferred Collection Type->Urine, Clean Catch   TROPONIN I   B-TYPE NATRIURETIC PEPTIDE   DRUG SCREEN PANEL, URINE EMERGENCY    Narrative:     Preferred Collection Type->Urine, Clean Catch   MAGNESIUM   MAGNESIUM   TROPONIN I   LIPASE     EKG Readings: (Independently Interpreted)   Initial Reading: No STEMI. Rhythm: Normal Sinus Rhythm.   Ventricular rate is 100 QTC interval is 482 normal sinus rhythm.     ECG Results          EKG 12-lead (In process)  Result time 05/26/20 09:54:44    In process by Interface, Lab In [facility] (05/26/20 09:54:44)                 Narrative:    Test Reason : R07.9,    Vent. Rate : 105 BPM     Atrial Rate : 105 BPM     P-R Int : 118 ms          QRS Dur : 118 ms      QT Int : 368 ms       P-R-T Axes : 088 097 072 degrees     QTc Int : 486 ms    Sinus tachycardia  Biatrial enlargement  Pulmonary disease pattern  Right bundle branch block  Abnormal ECG  When compared with ECG of 16-JAN-2020 05:58,  No significant change was found    Referred By: [email or web address]   SELF           Confirmed By:                   In process by Interface, Lab In [facility] (05/26/20 09:54:42)                 Narrative:    Test Reason : R07.9,    Vent. Rate : 105 BPM     Atrial Rate : 105 BPM     P-R Int : 118 ms          QRS Dur : 118 ms      QT Int : 368 ms       P-R-T Axes : 088 097 072 degrees     QTc Int : 486 ms    Sinus tachycardia  Biatrial enlargement  Pulmonary disease pattern  Right bundle branch block  Abnormal ECG  When compared with ECG of 16-JAN-2020 05:58,  No  significant change was found    Referred By: [email or web address]   SELF           Confirmed By:                             Imaging Results          CT Renal Stone Study ABD Pelvis WO (Final result)  Result time 05/26/20 12:45:06    Final result by Romario Ortiz MD (05/26/20 12:45:06)                 Impression:      1. No acute CT abnormality to associate with provided history of abdominal pain.  2. Emphysematous changes of the lungs.  3. Extensive atherosclerotic calcification of the aorta and its branches.  4. Additional findings above.      Electronically signed by: Romario Ortiz MD  Date:    05/26/2020  Time:    12:45             Narrative:    EXAMINATION:  CT RENAL STONE STUDY ABD PELVIS WO    CLINICAL HISTORY:  Abdominal pain, unspecified;    TECHNIQUE:  Low dose axial images, sagittal and coronal reformations were obtained from the lung bases to the pubic symphysis.  Contrast was not administered.    COMPARISON:  01/25/2019    FINDINGS:  Images of the lower thorax are remarkable for bilateral emphysematous change.    The liver, spleen, pancreas, gallbladder and adrenal glands have a grossly unremarkable noncontrast appearance.  There is no biliary dilation or ascites.  Pancreatic duct is not dilated.  The stomach is decompressed without wall thickening.  No significant abdominal lymphadenopathy.    There is some atrophic change of the left kidney as compared to the right, otherwise, the kidneys have a grossly unremarkable noncontrast appearance without hydronephrosis or nephrolithiasis noting left renal vascular calcification.  The bilateral ureters are unable to be visualized in their entirety to the urinary bladder, no definite calculi seen or secondary findings to suggest obstructive uropathy.  Correlation with urinalysis however is recommended.  The urinary bladder is unremarkable.  The uterus and adnexa is unremarkable.  No significant free fluid in the pelvis.  There is air density in the region of the  labia minora on the right, suggesting extraneous air rather than infectious process however correlation is recommended.    The distal large bowel is decompressed.  The terminal ileum is unremarkable.  The appendix is unremarkable in its visualized extent, no pericecal inflammation.  The small bowel is grossly unremarkable.  No convincing focal organized pelvic fluid collection.    Degenerative changes are noted of the spine.  There is osseous demineralization.  Posterior spinal fusion hardware spans L5-S1 noting grade 1/2 anterolisthesis of L5 on S1.  There is extensive atherosclerotic calcification of the aorta and its branches.  No significant inguinal lymphadenopathy.                               X-Ray Chest AP Portable (Final result)  Result time 05/26/20 11:53:17    Final result by Doris Brennan MD (05/26/20 11:53:17)                 Impression:      No acute intrathoracic process seen.      Electronically signed by: Doris Brennan MD  Date:    05/26/2020  Time:    11:53             Narrative:    EXAMINATION:  XR CHEST AP PORTABLE    CLINICAL HISTORY:  chest pain;    TECHNIQUE:  Single frontal view of the chest was performed.    COMPARISON:  01/16/2020    FINDINGS:  EKG wires overlie the chest.  The cardiac silhouette is normal in size.  The pulmonary vascularity is normal.  The lungs are well inflated and clear.  No focal airspace disease.  No pleural effusion.  No pneumothorax.  The osseous structures appear within normal limits.  There is increased joint space at the right acromioclavicular joint can be associated with a prior injury or ligament tear.                                 Medical Decision Making:   Initial Assessment:   She appears well, in no apparent distress.  Alert and oriented times three, pleasant and cooperative. Vital signs are as documented in vital signs section.   patient states she has had chronic epigastric/ chest pain x1 year.  Patient has had and workup via a GI Dr.  Manny.   Patient states she has had multiple surgeries in her abdomen.  And no one can find an answer of the abdominal pain.   Patient does states that she has an appointment with GI on June 6 for at CT of the abdomen and pelvis.     Differential Diagnosis:    Chronic abdominal pain,   GI ulcers,  Peptic disease,  H pylori.   Clinical Tests:   Lab Tests: Ordered and Reviewed  Radiological Study: Ordered and Reviewed  Medical Tests: Ordered and Reviewed  ED Management:   Patient has been given p.o. Pain medication x1 she is also given Protonix p.o. x1 GI cocktail  Patient states that she  Has mild relief of the abdominal pain with interventions.   Although she elicits abdominal pain continues but has improved.  Patient was encouraged to follow-up with her GI doctor Dr. Bryant  As soon as possible  Instructed on home management and follow up with primary care physician.  Other:   I have discussed this case with another health care provider.                   ED Course as of May 26 1754   Tue May 26, 2020   1317 Attestation: Patient seen by me separately from the midlevel/resident. I agree with the history, physical and management of the patient.   The patient presents to the emergency department with chronic epigastric abdominal pain.  Patient has had surgeries and multiple workups she has seen gastroenterologist and had endoscopy and colonoscopy last year.  Her workup today is normal.  The patient is very tearful and I feel she could benefit from antidepressants.  She has a follow-up appointment with GI soon.  Unfortunately, since we are unable to find any pathology will discharge the patient to follow-up with her PCP.    [ST]      ED Course User Index  [ST] Keri Bills MD                Clinical Impression:       ICD-10-CM ICD-9-CM   1. Abdominal pain, unspecified abdominal location R10.9 789.00   2. Chest pain R07.9 786.50             ED Disposition Condition    Discharge Stable        ED Prescriptions     None         Follow-up Information     Follow up With Specialties Details Why Contact Info    Lila Redmond MD Internal Medicine Schedule an appointment as soon as possible for a visit in 1 day  [address]  [facility] [address]  [phone]                                       Olamide Avalos, [facility]  05/26/20 8402       Olamide Avalos, [facility]  05/26/20 4399

## 2020-05-26 NOTE — ED NOTES
Pt is alert and oriented. C/O epigastric pain x 2 days with nausea and vomiting. Pt states this pain has been happening for 3-4 years secondary to vessel blockage possibly in her abdomen or chest, she is unable to remember. Pt is emotional and crying, states the pain is 8/10 and stabbing at this time. Pt states she had surgery last year and the pain has not gone away however the last two days it has increased. Pt states she has increased sob with activity that is mostly relieved with rest. Pt also states she has been having difficulty urinating with a yellowish discharge for the last 2 days. Pt denies constipation, diarrhea.

## 2020-05-26 NOTE — ED NOTES
Pt unable to urinate at this time for sample. Pt placed on bedpan without success. Pt agrees to in and out catheter for UA.

## 2020-08-07 ENCOUNTER — HOSPITAL ENCOUNTER (OUTPATIENT)
Facility: HOSPITAL | Age: 55
Discharge: HOME | End: 2020-08-08
Attending: EMERGENCY MEDICINE | Admitting: EMERGENCY MEDICINE
Payer: MEDICAID

## 2020-08-07 DIAGNOSIS — R10.9 ABDOMINAL PAIN: ICD-10-CM

## 2020-08-07 DIAGNOSIS — K86.89 DILATED PANCREATIC DUCT: Primary | ICD-10-CM

## 2020-08-07 DIAGNOSIS — R10.9 FUNCTIONAL ABDOMINAL PAIN SYNDROME: ICD-10-CM

## 2020-08-07 LAB
ALBUMIN SERPL BCP-MCNC: 4.2 G/DL (ref 3.5–5.2)
ALP SERPL-CCNC: 101 U/L (ref 55–135)
ALT SERPL W/O P-5'-P-CCNC: 18 U/L (ref 10–44)
ANION GAP SERPL CALC-SCNC: 12 MMOL/L (ref 8–16)
AST SERPL-CCNC: 21 U/L (ref 10–40)
BASOPHILS # BLD AUTO: 0.03 K/UL (ref 0–0.2)
BASOPHILS NFR BLD: 0.3 % (ref 0–1.9)
BILIRUB SERPL-MCNC: 0.6 MG/DL (ref 0.1–1)
BILIRUB UR QL STRIP: NEGATIVE
BUN SERPL-MCNC: 11 MG/DL (ref 6–20)
CALCIUM SERPL-MCNC: 10.1 MG/DL (ref 8.7–10.5)
CHLORIDE SERPL-SCNC: 99 MMOL/L (ref 95–110)
CLARITY UR: CLEAR
CO2 SERPL-SCNC: 28 MMOL/L (ref 23–29)
COLOR UR: YELLOW
CREAT SERPL-MCNC: 0.9 MG/DL (ref 0.5–1.4)
DIFFERENTIAL METHOD: ABNORMAL
EOSINOPHIL # BLD AUTO: 0 K/UL (ref 0–0.5)
EOSINOPHIL NFR BLD: 0.3 % (ref 0–8)
ERYTHROCYTE [DISTWIDTH] IN BLOOD BY AUTOMATED COUNT: 16.1 % (ref 11.5–14.5)
EST. GFR  (AFRICAN AMERICAN): >60 ML/MIN/1.73 M^2
EST. GFR  (NON AFRICAN AMERICAN): >60 ML/MIN/1.73 M^2
GLUCOSE SERPL-MCNC: 112 MG/DL (ref 70–110)
GLUCOSE UR QL STRIP: NEGATIVE
HCT VFR BLD AUTO: 48.2 % (ref 37–48.5)
HGB BLD-MCNC: 16.3 G/DL (ref 12–16)
HGB UR QL STRIP: NEGATIVE
IMM GRANULOCYTES # BLD AUTO: 0.03 K/UL (ref 0–0.04)
IMM GRANULOCYTES NFR BLD AUTO: 0.3 % (ref 0–0.5)
KETONES UR QL STRIP: NEGATIVE
LACTATE SERPL-SCNC: 1.5 MMOL/L (ref 0.5–2.2)
LEUKOCYTE ESTERASE UR QL STRIP: NEGATIVE
LIPASE SERPL-CCNC: 24 U/L (ref 4–60)
LYMPHOCYTES # BLD AUTO: 2.7 K/UL (ref 1–4.8)
LYMPHOCYTES NFR BLD: 29 % (ref 18–48)
MCH RBC QN AUTO: 31.3 PG (ref 27–31)
MCHC RBC AUTO-ENTMCNC: 33.8 G/DL (ref 32–36)
MCV RBC AUTO: 93 FL (ref 82–98)
MONOCYTES # BLD AUTO: 0.9 K/UL (ref 0.3–1)
MONOCYTES NFR BLD: 9.5 % (ref 4–15)
NEUTROPHILS # BLD AUTO: 5.6 K/UL (ref 1.8–7.7)
NEUTROPHILS NFR BLD: 60.6 % (ref 38–73)
NITRITE UR QL STRIP: NEGATIVE
NRBC BLD-RTO: 0 /100 WBC
PH UR STRIP: 7 [PH] (ref 5–8)
PLATELET # BLD AUTO: 433 K/UL (ref 150–350)
PMV BLD AUTO: 8.7 FL (ref 9.2–12.9)
POTASSIUM SERPL-SCNC: 3.5 MMOL/L (ref 3.5–5.1)
PROT SERPL-MCNC: 7.7 G/DL (ref 6–8.4)
PROT UR QL STRIP: NEGATIVE
RBC # BLD AUTO: 5.2 M/UL (ref 4–5.4)
SODIUM SERPL-SCNC: 139 MMOL/L (ref 136–145)
SP GR UR STRIP: 1.01 (ref 1–1.03)
URN SPEC COLLECT METH UR: NORMAL
UROBILINOGEN UR STRIP-ACNC: NEGATIVE EU/DL
WBC # BLD AUTO: 9.25 K/UL (ref 3.9–12.7)

## 2020-08-07 PROCEDURE — 83605 ASSAY OF LACTIC ACID: CPT

## 2020-08-07 PROCEDURE — 96372 THER/PROPH/DIAG INJ SC/IM: CPT | Mod: 59

## 2020-08-07 PROCEDURE — 83690 ASSAY OF LIPASE: CPT

## 2020-08-07 PROCEDURE — 63600175 PHARM REV CODE 636 W HCPCS: Performed by: EMERGENCY MEDICINE

## 2020-08-07 PROCEDURE — 25000003 PHARM REV CODE 250: Performed by: EMERGENCY MEDICINE

## 2020-08-07 PROCEDURE — 93005 ELECTROCARDIOGRAM TRACING: CPT

## 2020-08-07 PROCEDURE — 96374 THER/PROPH/DIAG INJ IV PUSH: CPT

## 2020-08-07 PROCEDURE — 99285 EMERGENCY DEPT VISIT HI MDM: CPT | Mod: 25

## 2020-08-07 PROCEDURE — 85025 COMPLETE CBC W/AUTO DIFF WBC: CPT

## 2020-08-07 PROCEDURE — 80053 COMPREHEN METABOLIC PANEL: CPT

## 2020-08-07 PROCEDURE — 81003 URINALYSIS AUTO W/O SCOPE: CPT

## 2020-08-07 PROCEDURE — 25500020 PHARM REV CODE 255: Performed by: EMERGENCY MEDICINE

## 2020-08-07 PROCEDURE — 96361 HYDRATE IV INFUSION ADD-ON: CPT

## 2020-08-07 RX ORDER — MORPHINE SULFATE 4 MG/ML
4 INJECTION, SOLUTION INTRAMUSCULAR; INTRAVENOUS
Status: COMPLETED | OUTPATIENT
Start: 2020-08-07 | End: 2020-08-07

## 2020-08-07 RX ORDER — HALOPERIDOL 5 MG/ML
5 INJECTION INTRAMUSCULAR
Status: COMPLETED | OUTPATIENT
Start: 2020-08-07 | End: 2020-08-07

## 2020-08-07 RX ADMIN — HALOPERIDOL LACTATE 5 MG: 5 INJECTION, SOLUTION INTRAMUSCULAR at 11:08

## 2020-08-07 RX ADMIN — MORPHINE SULFATE 4 MG: 4 INJECTION INTRAVENOUS at 09:08

## 2020-08-07 RX ADMIN — IOHEXOL 75 ML: 350 INJECTION, SOLUTION INTRAVENOUS at 10:08

## 2020-08-07 RX ADMIN — SODIUM CHLORIDE 1000 ML: 0.9 INJECTION, SOLUTION INTRAVENOUS at 09:08

## 2020-08-08 VITALS
HEART RATE: 75 BPM | TEMPERATURE: 98 F | RESPIRATION RATE: 17 BRPM | BODY MASS INDEX: 17.16 KG/M2 | SYSTOLIC BLOOD PRESSURE: 133 MMHG | DIASTOLIC BLOOD PRESSURE: 83 MMHG | HEIGHT: 64 IN | OXYGEN SATURATION: 95 %

## 2020-08-08 LAB
ALBUMIN SERPL BCP-MCNC: 3.8 G/DL (ref 3.5–5.2)
ALP SERPL-CCNC: 92 U/L (ref 55–135)
ALT SERPL W/O P-5'-P-CCNC: 18 U/L (ref 10–44)
ANION GAP SERPL CALC-SCNC: 7 MMOL/L (ref 8–16)
AST SERPL-CCNC: 34 U/L (ref 10–40)
BASOPHILS # BLD AUTO: 0.03 K/UL (ref 0–0.2)
BASOPHILS NFR BLD: 0.3 % (ref 0–1.9)
BILIRUB SERPL-MCNC: 0.5 MG/DL (ref 0.1–1)
BUN SERPL-MCNC: 9 MG/DL (ref 6–20)
CALCIUM SERPL-MCNC: 9.2 MG/DL (ref 8.7–10.5)
CHLORIDE SERPL-SCNC: 105 MMOL/L (ref 95–110)
CO2 SERPL-SCNC: 27 MMOL/L (ref 23–29)
CREAT SERPL-MCNC: 0.8 MG/DL (ref 0.5–1.4)
DIFFERENTIAL METHOD: ABNORMAL
EOSINOPHIL # BLD AUTO: 0 K/UL (ref 0–0.5)
EOSINOPHIL NFR BLD: 0.4 % (ref 0–8)
ERYTHROCYTE [DISTWIDTH] IN BLOOD BY AUTOMATED COUNT: 16.5 % (ref 11.5–14.5)
EST. GFR  (AFRICAN AMERICAN): >60 ML/MIN/1.73 M^2
EST. GFR  (NON AFRICAN AMERICAN): >60 ML/MIN/1.73 M^2
GLUCOSE SERPL-MCNC: 96 MG/DL (ref 70–110)
HCT VFR BLD AUTO: 47.3 % (ref 37–48.5)
HGB BLD-MCNC: 15.5 G/DL (ref 12–16)
IMM GRANULOCYTES # BLD AUTO: 0.03 K/UL (ref 0–0.04)
IMM GRANULOCYTES NFR BLD AUTO: 0.3 % (ref 0–0.5)
LIPASE SERPL-CCNC: 23 U/L (ref 4–60)
LYMPHOCYTES # BLD AUTO: 3 K/UL (ref 1–4.8)
LYMPHOCYTES NFR BLD: 28 % (ref 18–48)
MCH RBC QN AUTO: 30.8 PG (ref 27–31)
MCHC RBC AUTO-ENTMCNC: 32.8 G/DL (ref 32–36)
MCV RBC AUTO: 94 FL (ref 82–98)
MONOCYTES # BLD AUTO: 1 K/UL (ref 0.3–1)
MONOCYTES NFR BLD: 9.5 % (ref 4–15)
NEUTROPHILS # BLD AUTO: 6.5 K/UL (ref 1.8–7.7)
NEUTROPHILS NFR BLD: 61.5 % (ref 38–73)
NRBC BLD-RTO: 0 /100 WBC
PLATELET # BLD AUTO: 390 K/UL (ref 150–350)
PMV BLD AUTO: 8.5 FL (ref 9.2–12.9)
POTASSIUM SERPL-SCNC: 3.8 MMOL/L (ref 3.5–5.1)
PROT SERPL-MCNC: 6.9 G/DL (ref 6–8.4)
RBC # BLD AUTO: 5.03 M/UL (ref 4–5.4)
SARS-COV-2 RDRP RESP QL NAA+PROBE: NEGATIVE
SODIUM SERPL-SCNC: 139 MMOL/L (ref 136–145)
WBC # BLD AUTO: 10.63 K/UL (ref 3.9–12.7)

## 2020-08-08 PROCEDURE — 25000003 PHARM REV CODE 250: Performed by: EMERGENCY MEDICINE

## 2020-08-08 PROCEDURE — U0002 COVID-19 LAB TEST NON-CDC: HCPCS

## 2020-08-08 PROCEDURE — G0378 HOSPITAL OBSERVATION PER HR: HCPCS

## 2020-08-08 PROCEDURE — 63600175 PHARM REV CODE 636 W HCPCS: Performed by: EMERGENCY MEDICINE

## 2020-08-08 PROCEDURE — 80053 COMPREHEN METABOLIC PANEL: CPT

## 2020-08-08 PROCEDURE — 96376 TX/PRO/DX INJ SAME DRUG ADON: CPT

## 2020-08-08 PROCEDURE — 94761 N-INVAS EAR/PLS OXIMETRY MLT: CPT

## 2020-08-08 PROCEDURE — 85025 COMPLETE CBC W/AUTO DIFF WBC: CPT

## 2020-08-08 PROCEDURE — 83690 ASSAY OF LIPASE: CPT

## 2020-08-08 RX ORDER — ACETAMINOPHEN 325 MG/1
650 TABLET ORAL EVERY 8 HOURS PRN
Status: DISCONTINUED | OUTPATIENT
Start: 2020-08-08 | End: 2020-08-08 | Stop reason: HOSPADM

## 2020-08-08 RX ORDER — MORPHINE SULFATE 4 MG/ML
4 INJECTION, SOLUTION INTRAMUSCULAR; INTRAVENOUS EVERY 4 HOURS PRN
Status: DISCONTINUED | OUTPATIENT
Start: 2020-08-08 | End: 2020-08-08 | Stop reason: HOSPADM

## 2020-08-08 RX ORDER — FAMOTIDINE 20 MG/1
20 TABLET, FILM COATED ORAL 2 TIMES DAILY
Status: DISCONTINUED | OUTPATIENT
Start: 2020-08-08 | End: 2020-08-08 | Stop reason: HOSPADM

## 2020-08-08 RX ORDER — SODIUM CHLORIDE 0.9 % (FLUSH) 0.9 %
10 SYRINGE (ML) INJECTION
Status: DISCONTINUED | OUTPATIENT
Start: 2020-08-08 | End: 2020-08-08 | Stop reason: HOSPADM

## 2020-08-08 RX ORDER — ONDANSETRON 2 MG/ML
4 INJECTION INTRAMUSCULAR; INTRAVENOUS EVERY 8 HOURS PRN
Status: DISCONTINUED | OUTPATIENT
Start: 2020-08-08 | End: 2020-08-08 | Stop reason: HOSPADM

## 2020-08-08 RX ORDER — OXYCODONE HYDROCHLORIDE 5 MG/1
5 TABLET ORAL EVERY 4 HOURS PRN
Status: DISCONTINUED | OUTPATIENT
Start: 2020-08-08 | End: 2020-08-08 | Stop reason: HOSPADM

## 2020-08-08 RX ADMIN — MORPHINE SULFATE 4 MG: 4 INJECTION INTRAVENOUS at 08:08

## 2020-08-08 RX ADMIN — MORPHINE SULFATE 4 MG: 4 INJECTION INTRAVENOUS at 04:08

## 2020-08-08 RX ADMIN — FAMOTIDINE 20 MG: 20 TABLET ORAL at 09:08

## 2020-08-08 NOTE — ED NOTES
Patient is being discharged to home. Patient verbalizes understanding of discharge instructions and follow up visits

## 2020-08-08 NOTE — DISCHARGE SUMMARY
[facility] Hospital Medicine  Discharge Summary      Patient Name: Amaya Hall  MRN: [id number]  Admission Date: 8/7/2020  Hospital Length of Stay: 0 days  Discharge Date and Time:  08/08/2020 10:28 AM  Attending Physician: No att. providers found   Discharging Provider: Keri Bills MD  Primary Care Provider: Lila Redmond MD        HPI:   The patient presents to the ED due to upper abdominal pain. She reports onset of symptoms was 3 days ago. The patient states the pain has progressively since onset. She describes the pain as stabbing. The patient has associated nausea, vomiting and diarrhea but denies any blood in vomit, blood in stool, urinary symptoms, fever or chills. She suspects she may have another bowel obstruction but does not specify if current pain is similar.          Hospital Course:   The patient was made NPO overnight. She was seen by GI this AM. They reviewed her US and labs. Dr. Hanson feels that the pancreatic duct dilation is not clinically significant and the patient has chronic abdominal pain. She can be discharged and seen as an outpatient in his clinic.    ROS:   Review of Systems   Constitutional: Negative for chills and fever.   Respiratory: Negative for cough and wheezing.    Cardiovascular: Negative for chest pain and leg swelling.   Gastrointestinal: Positive for abdominal pain. Negative for melena, nausea and vomiting.   Genitourinary: Negative for frequency and hematuria.   Musculoskeletal: Negative for back pain, myalgias and neck pain.   Skin: Negative for rash.   Neurological: Negative for dizziness and focal weakness.   Psychiatric/Behavioral: Negative for depression. The patient is not nervous/anxious.      Physical Exam   Constitutional: She is oriented to person, place, and time. She appears well-developed and well-nourished.   Neck: Normal range of motion. Neck supple.   Cardiovascular: Normal rate and regular rhythm.   Pulmonary/Chest: Effort  normal and breath sounds normal.   Abdominal: Soft. Bowel sounds are normal. She exhibits no distension. There is no abdominal tenderness. There is no rebound and no guarding.   Musculoskeletal: Normal range of motion.         General: No edema.   Neurological: She is alert and oriented to person, place, and time.   Skin: Skin is warm and dry.   Psychiatric: She has a normal mood and affect. Her behavior is normal.        Consults:   Consults (From admission, onward)        Status Ordering Provider     Inpatient consult to Gastroenterology  Once     Provider:  MD Arin Dhaliwal SALEM I.          Final Active Diagnoses:    Diagnosis Date Noted POA    PRINCIPAL PROBLEM:  Abdominal pain [R10.9] 03/30/2017 Yes      Problems Resolved During this Admission:      Discharged Condition: good    Disposition: Home or Self Care    Follow Up:  Follow-up Information     Schedule an appointment as soon as possible for a visit with Lila Redmond MD.    Specialty: Internal Medicine  Contact information:  [address]  [address]  [facility] [id number] [phone]             Schedule an appointment as soon as possible for a visit with Sindi Castro MD.    Specialty: Gastroenterology  Contact information:  [address]  [address]  [place] [address]  [phone]                 Patient Instructions:      Ambulatory referral/consult to Gastroenterology   Standing Status: Future   Referral Priority: Routine Referral Type: Consultation   Referral Reason: Specialty Services Required   Referred to Provider: SINDI CASTRO Requested Specialty: Gastroenterology   Number of Visits Requested: 1     Diet Adult Regular     Activity as tolerated     Medications:  Reconciled Home Medications:      Medication List      CHANGE how you take these medications    HYDROcodone-acetaminophen 5-325 mg per tablet  Commonly known as: NORCO  Take 1 tablet by mouth every 6 (six) hours as needed for  Pain.  What changed: Another medication with the same name was removed. Continue taking this medication, and follow the directions you see here.        CONTINUE taking these medications    ALBUTEROL INHL  2.5 mg.     alum-mag hydroxide-simeth 225-200-25 mg/5 mL Susp  Take as directed on bottle     amitriptyline 25 MG tablet  Commonly known as: ELAVIL  Take one pill by mouth nightly for pain.     * amLODIPine 5 MG tablet  Commonly known as: NORVASC  TK 1 T PO ONCE DAILY FOR 30 DAYS     * amLODIPine 10 MG tablet  Commonly known as: NORVASC  Take 0.5 tablets (5 mg total) by mouth once daily.     atorvastatin 80 MG tablet  Commonly known as: LIPITOR  Take 80 mg by mouth once daily.     budesonide-formoterol 160-4.5 mcg 160-4.5 mcg/actuation Hfaa  Commonly known as: SYMBICORT  Inhale 2 puffs into the lungs every 12 (twelve) hours.     CREON 36,000-114,000- 180,000 unit Cpdr  Generic drug: lipase-protease-amylase  TK ONE C PO WITH MEALS FOR 30 DAYS     dicyclomine 20 mg tablet  Commonly known as: BENTYL  TK 1 T PO TID PRN     DULoxetine 30 MG capsule  Commonly known as: CYMBALTA  Take 30 mg by mouth once daily.     esomeprazole magnesium 20 mg Tbec  Commonly known as: NexIUM 24HR  Take 20 mg by mouth once daily.     gabapentin 600 MG tablet  Commonly known as: NEURONTIN  Take 600 mg by mouth 3 (three) times daily.     hyoscyamine 0.125 mg Subl  Commonly known as: LEVSIN/SL  Place 1 tablet (0.125 mg total) under the tongue every 8 (eight) hours as needed (abdominal pain).     * nicotine 21 mg/24 hr  Commonly known as: NICODERM CQ  Place 1 patch onto the skin once daily.     * nicotine 14 mg/24 hr  Commonly known as: NICODERM CQ  APPLY ONE  PATCH D     ondansetron 4 MG tablet  Commonly known as: ZOFRAN  Take 1 tablet (4 mg total) by mouth every 8 (eight) hours as needed.     pantoprazole 20 MG tablet  Commonly known as: PROTONIX  Take 20 mg by mouth once daily.     potassium chloride SA 20 MEQ tablet  Commonly known as:  K-DUR,KLOR-CON  Take 1 tablet (20 mEq total) by mouth once daily.     prochlorperazine 10 MG tablet  Commonly known as: COMPAZINE  Take 1 tablet (10 mg total) by mouth every 8 (eight) hours as needed (Nausea).     QUEtiapine 50 MG tablet  Commonly known as: SEROQUEL  Take 1 tablet (50 mg total) by mouth every evening.     SPIRIVA WITH HANDIHALER 18 mcg inhalation capsule  Generic drug: tiotropium  18 mcg.     sucralfate 100 mg/mL suspension  Commonly known as: CARAFATE  Take 10 mLs (1 g total) by mouth 4 (four) times daily with meals and nightly.     VENTOLIN HFA 90 mcg/actuation inhaler  Generic drug: albuterol  INL 2 PFS PO Q 4 H PRN         * This list has 4 medication(s) that are the same as other medications prescribed for you. Read the directions carefully, and ask your doctor or other care provider to review them with you.            STOP taking these medications    ondansetron 4 MG Tbdl  Commonly known as: ZOFRAN-ODT     ranitidine 150 MG tablet  Commonly known as: ZANTAC            Significant Diagnostic Studies: Labs:   CMP   Recent Labs   Lab 08/07/20  2146 08/08/20  0606    139   K 3.5 3.8   CL 99 105   CO2 28 27   * 96   BUN 11 9   CREATININE 0.9 0.8   CALCIUM 10.1 9.2   PROT 7.7 6.9   ALBUMIN 4.2 3.8   BILITOT 0.6 0.5   ALKPHOS 101 92   AST 21 34   ALT 18 18   ANIONGAP 12 7*   ESTGFRAFRICA >60 >60   EGFRNONAA >60 >60    and All labs within the past 24 hours have been reviewed  Radiology: CT scan:  There is mild to moderate gallbladder distention, there is no evidence for pericholecystic inflammatory change.  Pancreatic duct is mildly prominent measuring up to approximately 3.5 mm, however there is no evidence for peripancreatic inflammatory change, the common duct is not abnormally dilated measuring approximately 4 mm at the leslie hepatis.  Given the mild pancreatic ductal dilatation close clinical historical and laboratory correlation is needed correlation for mild early pancreatitis is  needed although no significant peripancreatic inflammatory change is noted.  If clinically warranted ERCP follow-up may be helpful.     There is no evidence for acute process of the liver, spleen, or adrenal glands.  Small renal hypodensities noted, too small for characterization, stable.  There is no evidence for hydronephrosis or obstructive uropathy or perinephric inflammatory change bilaterally.  The arterial vascular structures including the aorta demonstrate atherosclerotic calcifications, there is no aneurysmal dilatation of the aorta.  The urinary bladder appears unremarkable for degree of distention.  Evaluation of the uterus and adnexa is limited, obvious enlarged pelvic mass or cystic collection is not appreciated.     There is no evidence for small bowel obstructive process.  The appendix is difficult to definitively identify there is a structure thought to represent the appendix difficult to delineate in its entirety, it does not appear inflamed.  The colon demonstrates redundancy and mild tortuosity, there is mild prominence of the cecum with air and stool, there is mild air and stool throughout the colon without inflammatory or obstructive pattern.  There are diverticula of the sigmoid colon, there is appearance of wall thickening along the sigmoid colon without pericolonic inflammatory change, this may relate to concentric muscular hypertrophy of diverticulosis however correlation for signs or symptoms of mild or early colitis is needed.  There is no evidence for free intraperitoneal air.  The osseous structures demonstrate chronic change, postoperative change of the spine with associated hardware is noted.  Note is made of grade 1/2 anterolisthesis of L5 with respect S1 again noted.     Pending Diagnostic Studies:     None        Indwelling Lines/Drains at time of discharge:   Lines/Drains/Airways     None                 Time spent on the discharge of patient: 38 minutes  Patient was seen and  examined on the date of discharge and determined to be suitable for discharge.         Keri Bills MD  Department of Hospital Medicine  [facility]

## 2020-08-08 NOTE — ED PROVIDER NOTES
Encounter Date: 8/7/2020    SCRIBE #1 NOTE: I, Tatiana Fabian, am scribing for, and in the presence of,  Dr. Brunson. I have scribed the entire note.       History     Chief Complaint   Patient presents with    Abdominal Pain     Pt presents via  ems secondary to periumbilical pain x2 days. Also reports n/v/d.      Amaya Hall is a 54 y.o. female who  has a past medical history of Anxiety, COPD (chronic obstructive pulmonary disease), Gastric ulcer, chronic, GERD (gastroesophageal reflux disease), Hypercholesteremia, and Hypertension.    The patient presents to the ED due to upper abdominal pain. She reports onset of symptoms was 3 days ago. The patient states the pain has progressively since onset. She describes the pain as stabbing. The patient has associated nausea, vomiting and diarrhea but denies any blood in vomit, blood in stool, urinary symptoms, fever or chills. She suspects she may have another bowel obstruction but does not specify if current pain is similar.      The history is provided by the patient.     Review of patient's allergies indicates:   Allergen Reactions    Ultram [tramadol] Swelling     Past Medical History:   Diagnosis Date    Anxiety     COPD (chronic obstructive pulmonary disease)     Gastric ulcer, chronic     GERD (gastroesophageal reflux disease)     Hypercholesteremia     Hypertension      Past Surgical History:   Procedure Laterality Date    BACK SURGERY       No family history on file.  Social History     Tobacco Use    Smoking status: Current Every Day Smoker     Packs/day: 1.00     Years: 43.00     Pack years: 43.00     Types: Cigarettes   Substance Use Topics    Alcohol use: No    Drug use: Yes     Types: Marijuana     Comment: occasional     Review of Systems   Constitutional: Negative for chills and fever.   Respiratory: Negative for shortness of breath.    Cardiovascular: Negative for chest pain.   Gastrointestinal: Positive for abdominal pain,  diarrhea, nausea and vomiting. Negative for blood in stool.   All other systems reviewed and are negative.      Physical Exam     Initial Vitals [08/07/20 2112]   BP Pulse Resp Temp SpO2   (!) 155/93 93 20 98.7 °F (37.1 °C) 96 %      MAP       --         Physical Exam    Nursing note and vitals reviewed.  Constitutional: She appears well-developed and well-nourished. She is not diaphoretic.   Appears uncomfortable due to pain   HENT:   Head: Normocephalic and atraumatic.   Right Ear: Tympanic membrane normal.   Left Ear: Tympanic membrane normal.   Mouth/Throat: Oropharynx is clear and moist.   Eyes: Conjunctivae and EOM are normal. Pupils are equal, round, and reactive to light.   Neck: Normal range of motion. Neck supple.   Cardiovascular: Normal rate, regular rhythm and normal heart sounds. Exam reveals no gallop and no friction rub.    No murmur heard.  Pulmonary/Chest: Breath sounds normal. She has no wheezes. She has no rhonchi. She has no rales.   Abdominal: Soft. Bowel sounds are normal. There is abdominal tenderness. There is no rebound and no guarding.   Significant pain to palpation worse in epigastric-periumbilical region. Post surgical scar noted.    Musculoskeletal: Normal range of motion. No tenderness or edema.   Lymphadenopathy:     She has no cervical adenopathy.   Neurological: She is alert and oriented to person, place, and time. She has normal strength.   Skin: Skin is warm and dry. Capillary refill takes less than 2 seconds. No rash noted.         ED Course   Procedures  Labs Reviewed   CBC W/ AUTO DIFFERENTIAL - Abnormal; Notable for the following components:       Result Value    Hemoglobin 16.3 (*)     Mean Corpuscular Hemoglobin 31.3 (*)     RDW 16.1 (*)     Platelets 433 (*)     MPV 8.7 (*)     All other components within normal limits   COMPREHENSIVE METABOLIC PANEL - Abnormal; Notable for the following components:    Glucose 112 (*)     All other components within normal limits   LIPASE    URINALYSIS, REFLEX TO URINE CULTURE    Narrative:     Specimen Source->Urine   LACTIC ACID, PLASMA   SARS-COV-2 RNA AMPLIFICATION, QUAL   CBC W/ AUTO DIFFERENTIAL   COMPREHENSIVE METABOLIC PANEL   LIPASE          Imaging Results           US Abdomen Limited (Final result)  Result time 08/08/20 01:24:13    Final result by Valarie Duong MD (08/08/20 01:24:13)                 Impression:      No cholelithiasis or evidence of acute cholecystitis.    Dilated pancreatic duct up to 3.4 mm.  Consider ERCP or MRCP.    This report was flagged in Epic as abnormal.      Electronically signed by: Valarie Duong  Date:    08/08/2020  Time:    01:24             Narrative:    EXAMINATION:  ULTRASOUND ABDOMEN LIMITED    CLINICAL HISTORY:  ruq pain;    TECHNIQUE:  Limited ultrasound of the right upper quadrant of the abdomen (including pancreas, liver, gallbladder, common bile duct, and spleen) was performed.    COMPARISON:  None.    FINDINGS:  Liver: Normal in size, measuring 11.3 cm. Homogeneous echotexture. No focal hepatic lesions.    Gallbladder: No calculi.  No wall thickening, or pericholecystic fluid.  No sonographic Wade's sign.    Biliary system: The common duct is not dilated, measuring 4.4 mm.  No intrahepatic ductal dilatation.    Spleen: The spleen is normal in size, measuring 7.3 x 3.1 cm.    Miscellaneous: Pancreatic duct is dilated to 3.4 mm.                                CT Abdomen Pelvis With Contrast (Final result)  Result time 08/07/20 23:21:05    Final result by Lit Gupta MD (08/07/20 23:21:05)                 Impression:      Mild pancreatic ductal dilatation noted without evidence for peripancreatic or pericholecystic inflammatory change and without evidence for abnormal biliary dilatation, etiology is uncertain, clinical historical and laboratory correlation is needed, correlation for signs or symptoms of mild or early pancreatitis is needed although significant pancreatic inflammatory  change is not seen.    Mild gastric wall and fold thickening, clinical and historical correlation is needed to determine need for additional follow-up, if indicated upper GI or endoscopy may be helpful.    Mild wall and fold thickening of the sigmoid colon without significant pericolonic stranding may relate to concentric muscular hypertrophy of diverticulosis, without convincing evidence for acute diverticulitis however close clinical and historical correlation for signs or symptoms of mild sigmoid colitis is otherwise needed.    This report was flagged in Epic as abnormal.      Electronically signed by: Lit Gupta  Date:    08/07/2020  Time:    23:21             Narrative:    EXAMINATION:  CT ABDOMEN PELVIS WITH CONTRAST    CLINICAL HISTORY:  Abdominal distension;    TECHNIQUE:  Low dose axial images, sagittal and coronal reformations were obtained from the lung bases to the pubic symphysis following the IV administration of 75 mL of Omnipaque 350 .  Oral contrast was not given.    COMPARISON:  CT examination abdomen and pelvis January 25, 2019, CT examination abdomen and pelvis May 26, 2020    FINDINGS:  Single-phase CT examination of the abdomen and pelvis was performed.  The lung bases demonstrate chronic and atelectatic change.  There is fluid and air within the gastric lumen, without abnormal distention, there is some wall and fold thickening greater than expected for degree of distention, clinical and historical correlation for gastric symptomatology is needed, if indicated upper GI or endoscopy may be helpful for further evaluation.    There is mild to moderate gallbladder distention, there is no evidence for pericholecystic inflammatory change.  Pancreatic duct is mildly prominent measuring up to approximately 3.5 mm, however there is no evidence for peripancreatic inflammatory change, the common duct is not abnormally dilated measuring approximately 4 mm at the leslie hepatis.  Given the mild  pancreatic ductal dilatation close clinical historical and laboratory correlation is needed correlation for mild early pancreatitis is needed although no significant peripancreatic inflammatory change is noted.  If clinically warranted ERCP follow-up may be helpful.    There is no evidence for acute process of the liver, spleen, or adrenal glands.  Small renal hypodensities noted, too small for characterization, stable.  There is no evidence for hydronephrosis or obstructive uropathy or perinephric inflammatory change bilaterally.  The arterial vascular structures including the aorta demonstrate atherosclerotic calcifications, there is no aneurysmal dilatation of the aorta.  The urinary bladder appears unremarkable for degree of distention.  Evaluation of the uterus and adnexa is limited, obvious enlarged pelvic mass or cystic collection is not appreciated.    There is no evidence for small bowel obstructive process.  The appendix is difficult to definitively identify there is a structure thought to represent the appendix difficult to delineate in its entirety, it does not appear inflamed.  The colon demonstrates redundancy and mild tortuosity, there is mild prominence of the cecum with air and stool, there is mild air and stool throughout the colon without inflammatory or obstructive pattern.  There are diverticula of the sigmoid colon, there is appearance of wall thickening along the sigmoid colon without pericolonic inflammatory change, this may relate to concentric muscular hypertrophy of diverticulosis however correlation for signs or symptoms of mild or early colitis is needed.  There is no evidence for free intraperitoneal air.  The osseous structures demonstrate chronic change, postoperative change of the spine with associated hardware is noted.  Note is made of grade 1/2 anterolisthesis of L5 with respect S1 again noted.                                 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  Onset 2 days epigastric periumbilical with associated nausea vomiting.  Patient appears uncomfortable due to pain is crying.  Reports this feels different than when she had her celiac artery surgery.  Diffuse abdominal tenderness with guarding noted.  Last bowel movement was 3 days ago.  Differential includes small versus large bowel obstruction, cholecystitis, pancreatitis, appendicitis versus other cause.  Will obtain blood work CT imaging pain control reassess.  Clinical Tests:   Lab Tests: Ordered and Reviewed                   ED Course as of Aug 08 0318   Fri Aug 07, 2020   2330 CT reviewed, mild to moderate gallbladder distention, no elevated liver enzymes.  Will obtain ultrasound, will give Haldol for pain will reassess.    [SE]   2330 EKG normal sinus rhythm 75 beats per minute, right bundle branch block noted, stable from previous, no STEMI    [SE]   Sat Aug 08, 2020   0131 Resting in bed, some improvement in pain.  CT ultrasound reviewed.  Dilation of the pancreatic duct noted.  May need an MRCP/ERCP.  Will plan admission to ED observation, will consult GI.    [SE]      ED Course User Index  [SE] Madeline Brunson MD                Clinical Impression:     1. Dilated pancreatic duct    2. Abdominal pain            ED Disposition Condition    Observation           My Scribe Attestation: I acknowledge that the documentation on this chart was provided by described on the date of service noted above and that the documentation in the chart accurately reflects work and decisions made by me alone.                 Madeline Brunson MD  08/08/20 0317

## 2020-08-08 NOTE — CONSULTS
[facility]    CC: Abdominal pain    HPI 54 y.o. female with PMHx of chronic abdominal pain and gastritis who presents for acute on chronic, persistent epigastric abdominal pain with associated nausea and vomiting of non-bilious, non-bloody emesis x2 days. The patient reports 3-4 years of chronic abdominal pain but states it has progressively worsened over the last 2-3 months with acute worsening and nausea and vomiting over the last 2 days. She describes sharp epigastric abdominal pain that is worsened with food intake, localized to epigastrium, and intermittent. She also reports diarrhea that is intermittent with loose stools up to 6 times daily without blood or mucous. She follows with Dr. Sandoval at [facility] and reports she last had an EGD done last week with biopsy negative for H pylori. Per our records, EGD performed 3/2017 with diffuse moderately erythematous mucosa without bleeding was found and pathology was negative for H pylori (suspected functional abdominal pain). She takes Pantoprazole 40 mg daily. Denies NSAIDs and anticoagulant use. She states she does not drink alcohol or use illicit drugs. She is a current smoker and smokes 1 ppd. Reports weight loss and nausea but states she would like to drink liquids. Denies melena, hematochezia.    In the ED, she was afebrile and HDS. Labs unremarkable with lipase, AST/ALT, t bili, WBC, Hgb within normal limits. CT A/P and US abdomen with mild pancreatic ductal dilatation without peripancreatic or pericholecystic inflammatory changes, no CBD dilatation, mild gastric wall and fold thickening, mild wall and fold thickening of sigmoid colon without pericolonic stranding. GI was consulted for pancreatic duct dilation.    Chart reviewed and summarized here.    Past Medical History  Past Medical History:   Diagnosis Date    Anxiety     COPD (chronic obstructive pulmonary disease)     Gastric ulcer, chronic     GERD (gastroesophageal reflux disease)      "Hypercholesteremia     Hypertension        Past Surgical History  Past Surgical History:   Procedure Laterality Date    BACK SURGERY         Social History  Social History     Tobacco Use    Smoking status: Current Every Day Smoker     Packs/day: 1.00     Years: 43.00     Pack years: 43.00     Types: Cigarettes   Substance Use Topics    Alcohol use: No    Drug use: Yes     Types: Marijuana     Comment: occasional       Family History  FH of sister w/ liver cancer, brother with stomach/colon cancer    Review of Systems  General ROS: negative for chills, fever. +weight loss  Psychological ROS: negative for hallucination, depression   Ophthalmic ROS: negative for blurry vision, photophobia or eye pain  ENT ROS: negative for epistaxis, sore throat or rhinorrhea  Respiratory ROS: no cough, shortness of breath, or wheezing  Cardiovascular ROS: no chest pain or dyspnea on exertion  Gastrointestinal ROS: no change in bowel habits, or black/ bloody stools. +epigastric abdominal pain, +nausea  Genito-Urinary ROS: no dysuria, trouble voiding, or hematuria  Musculoskeletal ROS: negative for gait disturbance or muscular weakness  Neurological ROS: no syncope or seizures; no ataxia  Dermatological ROS: negative for pruritis, rash and jaundice    Physical Examination  /85   Pulse 89   Temp 98.4 °F (36.9 °C) (Oral)   Resp (!) 43   Ht 5' 4" (1.626 m)   LMP  (LMP Unknown)   SpO2 (!) 94%   Breastfeeding No   BMI 17.16 kg/m²   General appearance: alert, cooperative, no distress  HENT: Normocephalic, atraumatic, neck symmetrical, no nasal discharge   Eyes: conjunctivae/corneas clear, PERRL, EOM's intact  Lungs: clear to auscultation bilaterally, no dullness to percussion bilaterally  Heart: regular rate and rhythm without rub; no displacement of the PMI   Abdomen: soft, mild tenderness to light and deep palpation at epigastrium; bowel sounds normoactive; no organomegaly  Extremities: extremities symmetric; no " clubbing, cyanosis, or edema  Integument: Skin color, texture, turgor normal; no rashes; hair distrubution normal  Neurologic: Alert and oriented X 3, normal strength, normal coordination and gait  Psychiatric: no pressured speech; normal affect; no evidence of impaired cognition     Labs:  Lipase 23  T bili 0.5, alk phos 92, AST 34, ALT 18  WBC 10.6    Imaging:  CT A/P and US abdomen:  Minimal pancreatic ductal dilatation seen on review, no CBD dilation seen.    I have personally reviewed and interpreted these images.    Assessment:   54 year old woman with PMHx of chronic abdominal pain and gastritis who presents for acute on chronic, persistent epigastric abdominal pain with associated nausea and vomiting of non-bilious, non-bloody emesis and diarrhea x2 days. EGD done last week (at [facility]) with biopsy negative for H pylori. Per our records, EGD performed 3/2017 with diffuse moderately erythematous mucosa without bleeding was found and pathology was negative for H pylori (suspected functional abdominal pain). On Pantoprazole 40 mg daily, current smoker. Afebrile, abdomen mildly tender. Labs unremarkable. CT A/P and US abdomen with mild pancreatic ductal dilatation, mild gastric wall thickening, mild sigmoid colon thickening, no inflammatory stranding. GI was consulted for pancreatic duct dilation. Ddx includes for acute on chronic abdominal pain includes functional vs gastritis vs viral gastroenteritis.     Plan:   - Recommend supportive care with analgesics and fluids as tolerated. Advance diet as tolerated.  - Given no significant pancreatic ductal dilatation on imaging, would recommend outpatient follow up with either Dr. Sandoval at [facility] or with [facility]. No indication for further imaging or work up at this time.  - Please call GI on call with any additional questions.    Maryana Mckeon MD  [facility] PGY IV

## 2020-08-08 NOTE — ED NOTES
Pt crying again and reports that pain has returned after CT scan done and having to move on and off of stretcher. Pt also reports that she is beginning to feel nauseated again. Dr. Brunson notified. Awaiting further orders.

## 2020-08-08 NOTE — ED TRIAGE NOTES
Pt presents to ED via EMS crying and reports 10/10 epigastric pain and intermittent nausea. Pt reports that nausea was improved after given Zofran per EMS. Pt is laying prone in stretcher and states that pain is decreased when in prone position. Denies CP or SOB. Reports 1 episode of diarrhea this am. Pt reports hx of abd stents placed by Dr. Streeter and has never been pain free ever since.

## 2020-08-08 NOTE — H&P
[facility]ation [facility]  History and Physical          History of Present Illness:    The patient presents to the ED due to upper abdominal pain. She reports onset of symptoms was 3 days ago. The patient states the pain has progressively since onset. She describes the pain as stabbing. The patient has associated nausea, vomiting and diarrhea but denies any blood in vomit, blood in stool, urinary symptoms, fever or chills. She suspects she may have another bowel obstruction but does not specify if current pain is similar.        The history is provided by the patient.   PMHx   Past Medical History:   Diagnosis Date    Anxiety     COPD (chronic obstructive pulmonary disease)     Gastric ulcer, chronic     GERD (gastroesophageal reflux disease)     Hypercholesteremia     Hypertension       Past Surgical History:   Procedure Laterality Date    BACK SURGERY          Family Hx   No family history on file.     Social Hx   Social History     Socioeconomic History    Marital status: Single     Spouse name: Not on file    Number of children: Not on file    Years of education: Not on file    Highest education level: Not on file   Occupational History    Not on file   Social Needs    Financial resource strain: Not on file    Food insecurity     Worry: Not on file     Inability: Not on file    Transportation needs     Medical: Not on file     Non-medical: Not on file   Tobacco Use    Smoking status: Current Every Day Smoker     Packs/day: 1.00     Years: 43.00     Pack years: 43.00     Types: Cigarettes   Substance and Sexual Activity    Alcohol use: No    Drug use: Yes     Types: Marijuana     Comment: occasional    Sexual activity: Not on file   Lifestyle    Physical activity     Days per week: Not on file     Minutes per session: Not on file    Stress: Not on file   Relationships    Social connections     Talks on phone: Not on file     Gets together: Not on file     Attends Buddhism service: Not on  file     Active member of club or organization: Not on file     Attends meetings of clubs or organizations: Not on file     Relationship status: Not on file   Other Topics Concern    Not on file   Social History Narrative    ** Merged History Encounter **             Vital Signs   Vitals:    08/07/20 2153 08/07/20 2223 08/08/20 0001 08/08/20 0201   BP:  (!) 181/90 129/79 122/76   BP Location:       Patient Position:       Pulse:  96 87 85   Resp: (!) 24 20  15   Temp:       TempSrc:       SpO2:  96% 96% (!) 91%   Height:            Review of Systems  Review of Systems   Respiratory: Negative for shortness of breath.    Cardiovascular: Negative for chest pain.   Gastrointestinal: Positive for abdominal pain, nausea and vomiting.   Genitourinary: Negative for dysuria.   All other systems reviewed and are negative.      Brief Physical Exam/Reassessment   Physical Exam    Constitutional: She appears well-developed and well-nourished.   HENT:   Head: Normocephalic and atraumatic.   Eyes: Pupils are equal, round, and reactive to light.   Neck: Normal range of motion.   Cardiovascular: Normal rate, regular rhythm and normal heart sounds.   Pulmonary/Chest: Breath sounds normal. No respiratory distress.   Abdominal: Soft.   Mild epigastric abdominal pain no guarding rebound   Musculoskeletal: Normal range of motion.   Neurological: She is alert and oriented to person, place, and time. She has normal strength. GCS score is 15. GCS eye subscore is 4. GCS verbal subscore is 5. GCS motor subscore is 6.   Skin: Skin is warm and dry. Capillary refill takes less than 2 seconds.   Psychiatric: She has a normal mood and affect. Her behavior is normal. Thought content normal.         Medications:   Scheduled Meds:   famotidine  20 mg Oral BID     Continuous Infusions:  PRN Meds:.acetaminophen, morphine, ondansetron, oxyCODONE, sodium chloride 0.9%      Assessment/Plan:   Abdominal Pain    -onset 3 days, with progressively worsening  nausea vomiting diarrhea and abdominal pain  -blood work grossly normal lipase within limits  -still moderate abdominal pain after multiple of pain medication  -CT ultrasound concerning for dilated pancreatic duct without signs of acute cholecystitis or cholelithiasis  -will consult GI for decision possible ERCP/MRCP  -disposition will depend on GI recommendations
